# Patient Record
Sex: FEMALE | Race: OTHER | HISPANIC OR LATINO | URBAN - METROPOLITAN AREA
[De-identification: names, ages, dates, MRNs, and addresses within clinical notes are randomized per-mention and may not be internally consistent; named-entity substitution may affect disease eponyms.]

---

## 2023-07-24 ENCOUNTER — EMERGENCY (EMERGENCY)
Facility: HOSPITAL | Age: 23
LOS: 1 days | Discharge: HOME | End: 2023-07-24
Attending: STUDENT IN AN ORGANIZED HEALTH CARE EDUCATION/TRAINING PROGRAM
Payer: SELF-PAY

## 2023-07-24 VITALS
SYSTOLIC BLOOD PRESSURE: 113 MMHG | TEMPERATURE: 99 F | OXYGEN SATURATION: 99 % | DIASTOLIC BLOOD PRESSURE: 68 MMHG | WEIGHT: 190.04 LBS | HEART RATE: 94 BPM | RESPIRATION RATE: 16 BRPM

## 2023-07-24 PROCEDURE — 99284 EMERGENCY DEPT VISIT MOD MDM: CPT

## 2023-07-24 PROCEDURE — 99053 MED SERV 10PM-8AM 24 HR FAC: CPT

## 2023-07-24 RX ORDER — ACETAMINOPHEN 500 MG
650 TABLET ORAL ONCE
Refills: 0 | Status: COMPLETED | OUTPATIENT
Start: 2023-07-24 | End: 2023-07-24

## 2023-07-24 RX ORDER — SODIUM CHLORIDE 9 MG/ML
1000 INJECTION, SOLUTION INTRAVENOUS
Refills: 0 | Status: DISCONTINUED | OUTPATIENT
Start: 2023-07-24 | End: 2023-08-01

## 2023-07-24 RX ORDER — ONDANSETRON 8 MG/1
4 TABLET, FILM COATED ORAL ONCE
Refills: 0 | Status: COMPLETED | OUTPATIENT
Start: 2023-07-24 | End: 2023-07-24

## 2023-07-24 NOTE — ED ADULT TRIAGE NOTE - CHIEF COMPLAINT QUOTE
Ambulatory to ED c/o lower abdominal and back pain x multiple days. Patient states she is approx 8 weeks pregnant, 3rd pregnancy.

## 2023-07-25 VITALS
OXYGEN SATURATION: 99 % | TEMPERATURE: 98 F | DIASTOLIC BLOOD PRESSURE: 74 MMHG | RESPIRATION RATE: 18 BRPM | SYSTOLIC BLOOD PRESSURE: 110 MMHG | HEART RATE: 74 BPM

## 2023-07-25 LAB
ALBUMIN SERPL ELPH-MCNC: 4.3 G/DL — SIGNIFICANT CHANGE UP (ref 3.3–5.2)
ALP SERPL-CCNC: 59 U/L — SIGNIFICANT CHANGE UP (ref 40–120)
ALT FLD-CCNC: 13 U/L — SIGNIFICANT CHANGE UP
ANION GAP SERPL CALC-SCNC: 16 MMOL/L — SIGNIFICANT CHANGE UP (ref 5–17)
APPEARANCE UR: CLEAR — SIGNIFICANT CHANGE UP
AST SERPL-CCNC: 18 U/L — SIGNIFICANT CHANGE UP
BACTERIA # UR AUTO: ABNORMAL
BASOPHILS # BLD AUTO: 0.02 K/UL — SIGNIFICANT CHANGE UP (ref 0–0.2)
BASOPHILS NFR BLD AUTO: 0.2 % — SIGNIFICANT CHANGE UP (ref 0–2)
BILIRUB SERPL-MCNC: 0.3 MG/DL — LOW (ref 0.4–2)
BILIRUB UR-MCNC: NEGATIVE — SIGNIFICANT CHANGE UP
BUN SERPL-MCNC: 7 MG/DL — LOW (ref 8–20)
CALCIUM SERPL-MCNC: 10 MG/DL — SIGNIFICANT CHANGE UP (ref 8.4–10.5)
CHLORIDE SERPL-SCNC: 99 MMOL/L — SIGNIFICANT CHANGE UP (ref 96–108)
CO2 SERPL-SCNC: 22 MMOL/L — SIGNIFICANT CHANGE UP (ref 22–29)
COLOR SPEC: YELLOW — SIGNIFICANT CHANGE UP
CREAT SERPL-MCNC: 0.45 MG/DL — LOW (ref 0.5–1.3)
DIFF PNL FLD: NEGATIVE — SIGNIFICANT CHANGE UP
EGFR: 138 ML/MIN/1.73M2 — SIGNIFICANT CHANGE UP
EOSINOPHIL # BLD AUTO: 0.17 K/UL — SIGNIFICANT CHANGE UP (ref 0–0.5)
EOSINOPHIL NFR BLD AUTO: 2.1 % — SIGNIFICANT CHANGE UP (ref 0–6)
EPI CELLS # UR: SIGNIFICANT CHANGE UP
GLUCOSE SERPL-MCNC: 82 MG/DL — SIGNIFICANT CHANGE UP (ref 70–99)
GLUCOSE UR QL: 1000 MG/DL
HCG SERPL-ACNC: HIGH MIU/ML
HCT VFR BLD CALC: 36.8 % — SIGNIFICANT CHANGE UP (ref 34.5–45)
HGB BLD-MCNC: 12.7 G/DL — SIGNIFICANT CHANGE UP (ref 11.5–15.5)
IMM GRANULOCYTES NFR BLD AUTO: 0.2 % — SIGNIFICANT CHANGE UP (ref 0–0.9)
KETONES UR-MCNC: ABNORMAL
LEUKOCYTE ESTERASE UR-ACNC: ABNORMAL
LYMPHOCYTES # BLD AUTO: 2.73 K/UL — SIGNIFICANT CHANGE UP (ref 1–3.3)
LYMPHOCYTES # BLD AUTO: 34.1 % — SIGNIFICANT CHANGE UP (ref 13–44)
MCHC RBC-ENTMCNC: 29.2 PG — SIGNIFICANT CHANGE UP (ref 27–34)
MCHC RBC-ENTMCNC: 34.5 GM/DL — SIGNIFICANT CHANGE UP (ref 32–36)
MCV RBC AUTO: 84.6 FL — SIGNIFICANT CHANGE UP (ref 80–100)
MONOCYTES # BLD AUTO: 0.61 K/UL — SIGNIFICANT CHANGE UP (ref 0–0.9)
MONOCYTES NFR BLD AUTO: 7.6 % — SIGNIFICANT CHANGE UP (ref 2–14)
NEUTROPHILS # BLD AUTO: 4.46 K/UL — SIGNIFICANT CHANGE UP (ref 1.8–7.4)
NEUTROPHILS NFR BLD AUTO: 55.8 % — SIGNIFICANT CHANGE UP (ref 43–77)
NITRITE UR-MCNC: NEGATIVE — SIGNIFICANT CHANGE UP
PH UR: 6 — SIGNIFICANT CHANGE UP (ref 5–8)
PLATELET # BLD AUTO: 306 K/UL — SIGNIFICANT CHANGE UP (ref 150–400)
POTASSIUM SERPL-MCNC: 3.4 MMOL/L — LOW (ref 3.5–5.3)
POTASSIUM SERPL-SCNC: 3.4 MMOL/L — LOW (ref 3.5–5.3)
PROT SERPL-MCNC: 7.8 G/DL — SIGNIFICANT CHANGE UP (ref 6.6–8.7)
PROT UR-MCNC: NEGATIVE — SIGNIFICANT CHANGE UP
RBC # BLD: 4.35 M/UL — SIGNIFICANT CHANGE UP (ref 3.8–5.2)
RBC # FLD: 13.6 % — SIGNIFICANT CHANGE UP (ref 10.3–14.5)
RBC CASTS # UR COMP ASSIST: SIGNIFICANT CHANGE UP /HPF (ref 0–4)
SODIUM SERPL-SCNC: 136 MMOL/L — SIGNIFICANT CHANGE UP (ref 135–145)
SP GR SPEC: 1.01 — SIGNIFICANT CHANGE UP (ref 1.01–1.02)
UROBILINOGEN FLD QL: NEGATIVE MG/DL — SIGNIFICANT CHANGE UP
WBC # BLD: 8.01 K/UL — SIGNIFICANT CHANGE UP (ref 3.8–10.5)
WBC # FLD AUTO: 8.01 K/UL — SIGNIFICANT CHANGE UP (ref 3.8–10.5)
WBC UR QL: SIGNIFICANT CHANGE UP /HPF (ref 0–5)

## 2023-07-25 PROCEDURE — 76801 OB US < 14 WKS SINGLE FETUS: CPT

## 2023-07-25 PROCEDURE — T1013: CPT

## 2023-07-25 PROCEDURE — 76817 TRANSVAGINAL US OBSTETRIC: CPT

## 2023-07-25 PROCEDURE — 81001 URINALYSIS AUTO W/SCOPE: CPT

## 2023-07-25 PROCEDURE — 99284 EMERGENCY DEPT VISIT MOD MDM: CPT | Mod: 25

## 2023-07-25 PROCEDURE — 87186 SC STD MICRODIL/AGAR DIL: CPT

## 2023-07-25 PROCEDURE — 84702 CHORIONIC GONADOTROPIN TEST: CPT

## 2023-07-25 PROCEDURE — 76802 OB US < 14 WKS ADDL FETUS: CPT

## 2023-07-25 PROCEDURE — 87086 URINE CULTURE/COLONY COUNT: CPT

## 2023-07-25 PROCEDURE — 80053 COMPREHEN METABOLIC PANEL: CPT

## 2023-07-25 PROCEDURE — 76802 OB US < 14 WKS ADDL FETUS: CPT | Mod: 26

## 2023-07-25 PROCEDURE — 76801 OB US < 14 WKS SINGLE FETUS: CPT | Mod: 26

## 2023-07-25 PROCEDURE — 85025 COMPLETE CBC W/AUTO DIFF WBC: CPT

## 2023-07-25 PROCEDURE — 36415 COLL VENOUS BLD VENIPUNCTURE: CPT

## 2023-07-25 PROCEDURE — 96374 THER/PROPH/DIAG INJ IV PUSH: CPT

## 2023-07-25 PROCEDURE — 76817 TRANSVAGINAL US OBSTETRIC: CPT | Mod: 26

## 2023-07-25 RX ADMIN — SODIUM CHLORIDE 1000 MILLILITER(S): 9 INJECTION, SOLUTION INTRAVENOUS at 00:18

## 2023-07-25 RX ADMIN — Medication 650 MILLIGRAM(S): at 00:18

## 2023-07-25 RX ADMIN — ONDANSETRON 4 MILLIGRAM(S): 8 TABLET, FILM COATED ORAL at 00:18

## 2023-07-25 NOTE — ED PROVIDER NOTE - PATIENT PORTAL LINK FT
You can access the FollowMyHealth Patient Portal offered by [facility] by registering at the following website: [email or web address]. By joining [email or web address]’s FollowMyHealth portal, you will also be able to view your health information using other applications (apps) compatible with our system.

## 2023-07-25 NOTE — ED PROVIDER NOTE - NSFOLLOWUPINSTRUCTIONS_ED_ALL_ED_FT
Educación para el paciente: Síntomas del embarazo (Conceptos Básicos)  Redactado por los médicos y editores de [employer]  Por favor, corinna el descargo de responsabilidad al final de la página.    ¿Cuánto varían los síntomas del embarazo?  Los síntomas del embarazo son diferentes para cada persona. Además, la misma persona puede tener síntomas diferentes en cada embarazo. De todos modos, hay ciertos síntomas que son comunes en todos los embarazos.    Sidra artículo trata sobre los síntomas que pueden aparecer danielle un embarazo normal y saludable.    ¿Qué síntomas son comunes en la primera etapa del embarazo?  En la primera etapa del embarazo, a veces incluso antes de que la persona se entere de que está embarazada, algunos síntomas comunes pueden incluir:    ?Náuseas, con o sin vómitos – Se llaman “náuseas matutinas”, joshua pueden aparecer en cualquier momento del día. En la mayoría de las personas, las náuseas matutinas bledsoe solamente los primeros meses del embarazo.    ?Aumento de tamaño y dolor en los senos    ?Necesidad de orinar con más frecuencia de lo normal    ?Sensación de más cansancio de lo normal    ?Calambres leves en la parte inferior del área del estómago    ¿Qué síntomas pueden aparecer cuando el embarazo está más avanzado?  Pueden aparecer muchos síntomas diferentes cuando el embarazo está más avanzado. Pueden ser, entre otros:    ?Acidez estomacal o indigestión – [place] puede sentirse christina un ardor en el pecho o la garganta, o un dolor en el estómago o el pecho.    ?Estreñimiento, es decir, dificultad para evacuar    ?Hemorroides, que son venas inflamadas en el recto que pueden causar dolor o comezón – Podrían sangrar un poco al evacuar.    ?Congestión y sangrado nasal    ?Falta de aire – [place] puede empeorar gradualmente a medida que avanza el embarazo.    ?Dolor en la parte baja de la espalda    ?Calambres en las piernas    ?Problemas para dormir    ?Payal de nahid    ?Sangrado en las encías    ?Necesidad de orinar con más frecuencia de lo normal o danielle la noche    ?Sensación de cansancio    ?Engrosamiento del eleanor    ?Leve inflamación en los pies o los tobillos    ?Adormecimiento u hormigueo en simon mano, un pie o simon pierna    ?Venas varicosas, que son venas inflamadas y retorcidas    ?Contracciones “falsas” – Las contracciones se producen cuando el útero se contrae. Pueden causar dolor y hacer que el área del estómago se endurezca. Las contracciones “falsas”, también llamadas “contracciones de Arthur Mendez”, no indican que el trabajo de parto ha comenzado. Son diferentes de las contracciones “verdaderas”, que sí indican que el trabajo de parto está comenzando.    ?Cambios en la piel – Puede marsha muchos cambios diferentes en la piel danielle el embarazo, entre ellos:    •Cambios del color de la piel – Las tera podrían ponerse de color horace o rico. Además, la piel de ciertas partes del cuerpo se puede poner más oscura, por ejemplo, en zonas de la allen o alrededor de los pezones (imagen 1).    •Simon línea oscura en el área del estómago – La línea puede ir desde el ombligo hasta la alberto púbica.    •Estrías – Estas se amanda christina líneas londono y son más comunes en el área del estómago, los senos y los muslos.    •Arañas vasculares – Se amanda christina pequeñas telas de araña enrojecidas en la piel. Son más comunes en el dominic, la allen, la parte superior del pecho, los brazos y las marivel.    •Papilomas cutáneos – Son pequeños bultos de piel normal (imagen 2).    ¿Cuándo amanda llamar a mi médico o partera?  Debe llamar al médico o partera si:    ?Tiene sangrado de la vagina    ?Le sale líquido por la vagina    ?Siente que el bebé no se mueve christina siempre    ?Tiene un dolor en la espalda o el área del estómago que no mejora con soluciones que puede probar por bennett cuenta, christina descansar o cambiar de posición    ?Se siente mareada    ?Tiene acidez estomacal y no mejora al rupert antiácidos    ?Tiene un dolor de nahid que no mejora tras descansar simon hora en simon habitación oscura y silenciosa    ?Siente dolor al orinar o hay lalitha en la orina    ?Tiene fiebre    ?Tiene contracciones que son cada vez más lizzette y frecuentes    ?Siente dolor al respirar    ?Ve manchas oscuras o destellos de shai, o tiene la vista nublada    ?Le preocupa un cambio en bennett matilda (por ejemplo, la aparición de vómitos o un sarpullido)

## 2023-07-25 NOTE — ED PROVIDER NOTE - NS ED ATTENDING STATEMENT MOD
This was a shared visit with the NIDIA. I reviewed and verified the documentation and independently performed the documented:

## 2023-07-25 NOTE — ED ADULT NURSE NOTE - NSFALLUNIVINTERV_ED_ALL_ED
Bed/Stretcher in lowest position, wheels locked, appropriate side rails in place/Call bell, personal items and telephone in reach/Instruct patient to call for assistance before getting out of bed/chair/stretcher/Non-slip footwear applied when patient is off stretcher/[place] to call system/Physically safe environment - no spills, clutter or unnecessary equipment/Purposeful proactive rounding/Room/bathroom lighting operational, light cord in reach

## 2023-07-25 NOTE — ED PROVIDER NOTE - CPE EDP SKIN NORM
Spine appears normal with mild kyphosis. no0 midline or pt vertebral or step offs + left lower paraspinal tneerness to lower lumbar and sacral region. + TTP over the left gluteus. + straight leg bilateral. no calf tenderness bilaterally. 2+ dp pulses.
normal...

## 2023-07-25 NOTE — ED PROVIDER NOTE - NS ED ROS FT
ROS: CONTUSIONAL: Denies fever, chills, fatigue, wt loss. HEAD: Denies trauma, HA, Dizziness. EYE: Denies Acute visual changes, diplopia. ENMT: Denies change in hearing, tinnitus, epistaxis, difficulty swallowing, sore throat. CARDIO: Denies CP, palpitations, edema. RESP: Denies Cough, SOB , Diff breathing, hemoptysis. GI: ABD pain, Denies N/V,  change in bowel movement. URINARY: Denies difficulty urinating, pelvic pain. MS:  back pain Denies joint pain,, weakness, decreased ROM, swelling. NEURO: Denies change in gait, seizures, loss of sensation, dizziness, confusion LOC.  PSY: NO SI/HI. SKIN: Denies Rash, bruising.

## 2023-07-25 NOTE — ED PROVIDER NOTE - ADDITIONAL NOTES AND INSTRUCTIONS:
PT was evaluated At [facility] ED and was found to have a condition that warranted time of to rest and heal from WORK/SCHOOL.   Mateo Jaramillo PA-C

## 2023-07-25 NOTE — ED PROVIDER NOTE - OBJECTIVE STATEMENT
PT with SPMHX of being  approx 8wk preg   presents to the ED with complaint of lower abd pain and back pain that has been interment over the last 2 wk. Pt states that she has dull pain, non radiating,  that is not made better or worse by anything. Pt states that she has not been seen by GYn for this pregnancy. Pt admits to intermit N/V, Pt dines N/V, vag bleeding, vag discharge, urinary symptoms, abd pain, abd cramping,

## 2023-07-25 NOTE — ED PROVIDER NOTE - ATTENDING APP SHARED VISIT CONTRIBUTION OF CARE
22y F, , ~8 weeks pregnant, presents for lower abdominal pain. No vaginal bleeding. Has not yet seen OBGYN during this pregnancy. US showing twin live IUP. No acute findings on labs/urine. Medically stable for discharge with outpatient f/u.

## 2023-07-28 RX ORDER — CEPHALEXIN 500 MG
1 CAPSULE ORAL
Qty: 21 | Refills: 0
Start: 2023-07-28 | End: 2023-08-03

## 2023-08-21 ENCOUNTER — APPOINTMENT (OUTPATIENT)
Dept: ANTEPARTUM | Facility: CLINIC | Age: 23
End: 2023-08-21

## 2023-08-22 ENCOUNTER — APPOINTMENT (OUTPATIENT)
Dept: ANTEPARTUM | Facility: CLINIC | Age: 23
End: 2023-08-22

## 2023-09-11 PROBLEM — Z00.00 ENCOUNTER FOR PREVENTIVE HEALTH EXAMINATION: Status: ACTIVE | Noted: 2023-09-11

## 2023-09-20 ENCOUNTER — ASOB RESULT (OUTPATIENT)
Age: 23
End: 2023-09-20

## 2023-09-20 ENCOUNTER — APPOINTMENT (OUTPATIENT)
Dept: ANTEPARTUM | Facility: CLINIC | Age: 23
End: 2023-09-20
Payer: MEDICAID

## 2023-09-20 PROCEDURE — 76815 OB US LIMITED FETUS(S): CPT

## 2023-10-02 ENCOUNTER — INPATIENT (INPATIENT)
Facility: HOSPITAL | Age: 23
LOS: 7 days | Discharge: HOME | DRG: 831 | End: 2023-10-10
Attending: OBSTETRICS & GYNECOLOGY | Admitting: OBSTETRICS & GYNECOLOGY
Payer: COMMERCIAL

## 2023-10-02 VITALS
SYSTOLIC BLOOD PRESSURE: 95 MMHG | WEIGHT: 162.04 LBS | RESPIRATION RATE: 18 BRPM | HEIGHT: 67 IN | OXYGEN SATURATION: 100 % | HEART RATE: 126 BPM | TEMPERATURE: 102 F | DIASTOLIC BLOOD PRESSURE: 63 MMHG

## 2023-10-02 LAB
ALBUMIN SERPL ELPH-MCNC: 3.7 G/DL — SIGNIFICANT CHANGE UP (ref 3.3–5.2)
ALP SERPL-CCNC: 56 U/L — SIGNIFICANT CHANGE UP (ref 40–120)
ALT FLD-CCNC: 9 U/L — SIGNIFICANT CHANGE UP
ANION GAP SERPL CALC-SCNC: 18 MMOL/L — HIGH (ref 5–17)
APPEARANCE UR: CLEAR — SIGNIFICANT CHANGE UP
APTT BLD: 32.4 SEC — SIGNIFICANT CHANGE UP (ref 24.5–35.6)
AST SERPL-CCNC: 15 U/L — SIGNIFICANT CHANGE UP
BACTERIA # UR AUTO: ABNORMAL
BASE EXCESS BLDV CALC-SCNC: -5 MMOL/L — LOW (ref -2–3)
BASOPHILS # BLD AUTO: 0 K/UL — SIGNIFICANT CHANGE UP (ref 0–0.2)
BASOPHILS NFR BLD AUTO: 0 % — SIGNIFICANT CHANGE UP (ref 0–2)
BILIRUB SERPL-MCNC: 0.6 MG/DL — SIGNIFICANT CHANGE UP (ref 0.4–2)
BILIRUB UR-MCNC: NEGATIVE — SIGNIFICANT CHANGE UP
BUN SERPL-MCNC: 4.2 MG/DL — LOW (ref 8–20)
CA-I SERPL-SCNC: 1.19 MMOL/L — SIGNIFICANT CHANGE UP (ref 1.15–1.33)
CALCIUM SERPL-MCNC: 9.2 MG/DL — SIGNIFICANT CHANGE UP (ref 8.4–10.5)
CHLORIDE BLDV-SCNC: 102 MMOL/L — SIGNIFICANT CHANGE UP (ref 96–108)
CHLORIDE SERPL-SCNC: 96 MMOL/L — SIGNIFICANT CHANGE UP (ref 96–108)
CO2 SERPL-SCNC: 18 MMOL/L — LOW (ref 22–29)
COLOR SPEC: YELLOW — SIGNIFICANT CHANGE UP
CREAT SERPL-MCNC: 0.38 MG/DL — LOW (ref 0.5–1.3)
DIFF PNL FLD: ABNORMAL
EGFR: 143 ML/MIN/1.73M2 — SIGNIFICANT CHANGE UP
EOSINOPHIL # BLD AUTO: 0.01 K/UL — SIGNIFICANT CHANGE UP (ref 0–0.5)
EOSINOPHIL NFR BLD AUTO: 0.1 % — SIGNIFICANT CHANGE UP (ref 0–6)
EPI CELLS # UR: SIGNIFICANT CHANGE UP
GAS PNL BLDV: 131 MMOL/L — LOW (ref 136–145)
GAS PNL BLDV: SIGNIFICANT CHANGE UP
GLUCOSE BLDV-MCNC: 93 MG/DL — SIGNIFICANT CHANGE UP (ref 70–99)
GLUCOSE SERPL-MCNC: 88 MG/DL — SIGNIFICANT CHANGE UP (ref 70–99)
GLUCOSE UR QL: NEGATIVE — SIGNIFICANT CHANGE UP
HCO3 BLDV-SCNC: 20 MMOL/L — LOW (ref 22–29)
HCT VFR BLD CALC: 30.3 % — LOW (ref 34.5–45)
HCT VFR BLDA CALC: 31 % — SIGNIFICANT CHANGE UP
HGB BLD CALC-MCNC: 10.2 G/DL — LOW (ref 11.7–16.1)
HGB BLD-MCNC: 10.2 G/DL — LOW (ref 11.5–15.5)
IMM GRANULOCYTES NFR BLD AUTO: 0.6 % — SIGNIFICANT CHANGE UP (ref 0–0.9)
INR BLD: 1.11 RATIO — SIGNIFICANT CHANGE UP (ref 0.85–1.18)
KETONES UR-MCNC: ABNORMAL
LACTATE BLDV-MCNC: 1.3 MMOL/L — SIGNIFICANT CHANGE UP (ref 0.5–2)
LEUKOCYTE ESTERASE UR-ACNC: ABNORMAL
LIDOCAIN IGE QN: 12 U/L — LOW (ref 22–51)
LYMPHOCYTES # BLD AUTO: 0.85 K/UL — LOW (ref 1–3.3)
LYMPHOCYTES # BLD AUTO: 9.8 % — LOW (ref 13–44)
MCHC RBC-ENTMCNC: 29.7 PG — SIGNIFICANT CHANGE UP (ref 27–34)
MCHC RBC-ENTMCNC: 33.7 GM/DL — SIGNIFICANT CHANGE UP (ref 32–36)
MCV RBC AUTO: 88.1 FL — SIGNIFICANT CHANGE UP (ref 80–100)
MONOCYTES # BLD AUTO: 0.45 K/UL — SIGNIFICANT CHANGE UP (ref 0–0.9)
MONOCYTES NFR BLD AUTO: 5.2 % — SIGNIFICANT CHANGE UP (ref 2–14)
NEUTROPHILS # BLD AUTO: 7.34 K/UL — SIGNIFICANT CHANGE UP (ref 1.8–7.4)
NEUTROPHILS NFR BLD AUTO: 84.3 % — HIGH (ref 43–77)
NITRITE UR-MCNC: POSITIVE
PCO2 BLDV: 30 MMHG — LOW (ref 39–42)
PH BLDV: 7.42 — SIGNIFICANT CHANGE UP (ref 7.32–7.43)
PH UR: 7 — SIGNIFICANT CHANGE UP (ref 5–8)
PLATELET # BLD AUTO: 248 K/UL — SIGNIFICANT CHANGE UP (ref 150–400)
PO2 BLDV: 51 MMHG — HIGH (ref 25–45)
POTASSIUM BLDV-SCNC: 3.3 MMOL/L — LOW (ref 3.5–5.1)
POTASSIUM SERPL-MCNC: 3.2 MMOL/L — LOW (ref 3.5–5.3)
POTASSIUM SERPL-SCNC: 3.2 MMOL/L — LOW (ref 3.5–5.3)
PROT SERPL-MCNC: 6.8 G/DL — SIGNIFICANT CHANGE UP (ref 6.6–8.7)
PROT UR-MCNC: NEGATIVE — SIGNIFICANT CHANGE UP
PROTHROM AB SERPL-ACNC: 12.3 SEC — SIGNIFICANT CHANGE UP (ref 9.5–13)
RAPID RVP RESULT: SIGNIFICANT CHANGE UP
RBC # BLD: 3.44 M/UL — LOW (ref 3.8–5.2)
RBC # FLD: 13.2 % — SIGNIFICANT CHANGE UP (ref 10.3–14.5)
RBC CASTS # UR COMP ASSIST: SIGNIFICANT CHANGE UP /HPF (ref 0–4)
SAO2 % BLDV: 83.8 % — SIGNIFICANT CHANGE UP
SARS-COV-2 RNA SPEC QL NAA+PROBE: SIGNIFICANT CHANGE UP
SODIUM SERPL-SCNC: 131 MMOL/L — LOW (ref 135–145)
SP GR SPEC: 1 — LOW (ref 1.01–1.02)
UROBILINOGEN FLD QL: 1
WBC # BLD: 8.7 K/UL — SIGNIFICANT CHANGE UP (ref 3.8–10.5)
WBC # FLD AUTO: 8.7 K/UL — SIGNIFICANT CHANGE UP (ref 3.8–10.5)
WBC UR QL: ABNORMAL /HPF (ref 0–5)

## 2023-10-02 PROCEDURE — 99285 EMERGENCY DEPT VISIT HI MDM: CPT

## 2023-10-02 RX ORDER — CEFTRIAXONE 500 MG/1
1000 INJECTION, POWDER, FOR SOLUTION INTRAMUSCULAR; INTRAVENOUS ONCE
Refills: 0 | Status: DISCONTINUED | OUTPATIENT
Start: 2023-10-02 | End: 2023-10-02

## 2023-10-02 RX ORDER — SODIUM CHLORIDE 9 MG/ML
1000 INJECTION, SOLUTION INTRAVENOUS ONCE
Refills: 0 | Status: COMPLETED | OUTPATIENT
Start: 2023-10-02 | End: 2023-10-02

## 2023-10-02 RX ORDER — SODIUM CHLORIDE 9 MG/ML
2300 INJECTION INTRAMUSCULAR; INTRAVENOUS; SUBCUTANEOUS ONCE
Refills: 0 | Status: COMPLETED | OUTPATIENT
Start: 2023-10-02 | End: 2023-10-02

## 2023-10-02 RX ORDER — ACETAMINOPHEN 500 MG
1000 TABLET ORAL ONCE
Refills: 0 | Status: COMPLETED | OUTPATIENT
Start: 2023-10-02 | End: 2023-10-02

## 2023-10-02 RX ORDER — CEFTRIAXONE 500 MG/1
1000 INJECTION, POWDER, FOR SOLUTION INTRAMUSCULAR; INTRAVENOUS ONCE
Refills: 0 | Status: COMPLETED | OUTPATIENT
Start: 2023-10-02 | End: 2023-10-02

## 2023-10-02 RX ADMIN — SODIUM CHLORIDE 1000 MILLILITER(S): 9 INJECTION, SOLUTION INTRAVENOUS at 23:05

## 2023-10-02 RX ADMIN — Medication 400 MILLIGRAM(S): at 20:15

## 2023-10-02 RX ADMIN — Medication 1000 MILLIGRAM(S): at 20:30

## 2023-10-02 RX ADMIN — SODIUM CHLORIDE 2300 MILLILITER(S): 9 INJECTION INTRAMUSCULAR; INTRAVENOUS; SUBCUTANEOUS at 20:19

## 2023-10-02 RX ADMIN — Medication 1000 MILLIGRAM(S): at 20:51

## 2023-10-02 RX ADMIN — CEFTRIAXONE 1000 MILLIGRAM(S): 500 INJECTION, POWDER, FOR SOLUTION INTRAMUSCULAR; INTRAVENOUS at 20:16

## 2023-10-02 NOTE — ED ADULT NURSE NOTE - CHIEF COMPLAINT QUOTE
pt c/o low abd pain c/o chills headache, 4 months pregnant- denies vag bleeding  A&Ox3, resp wnl, denies pain on urination

## 2023-10-02 NOTE — ED PROVIDER NOTE - CLINICAL SUMMARY MEDICAL DECISION MAKING FREE TEXT BOX
23y female A0 about 20 weeks pregnant w/ no significant pmh presenting with fever, lower abdominal pain, and vomiting that began this morning. Patient tachycardic, febrile, and hypotensive meeting sepsis criteria. Sepsis workup initiated, TVUS ordered 23y female A0 about 20 weeks pregnant w/ no significant pmh presenting with fever, lower abdominal pain, and vomiting that began this morning. Patient tachycardic, febrile, and hypotensive meeting sepsis criteria. Sepsis workup initiated, TVUS ordered. Workup shows leukocytosis, UA consistent with infection. Blood pressure improved with 3L IV fluids. OB consulted, US showed two IUPs. Patient to be admitted to antepartum.

## 2023-10-02 NOTE — ED ADULT TRIAGE NOTE - CHIEF COMPLAINT QUOTE
pt c/o low abd pain c/o chills headache pt c/o low abd pain c/o chills headache, 4 months pregnant- denies vag bleeding  A&Ox3, resp wnl, denies pain on urination

## 2023-10-02 NOTE — ED ADULT NURSE NOTE - NS ED NURSE REPORT GIVEN DT
Mom called and stated that the patient is still having throat pain and she was told that a script for an antibiotic could be called in for the patient  Please give mom a call and let her know if this still can be done or when complete 03-Oct-2023 05:11

## 2023-10-02 NOTE — ED ADULT NURSE NOTE - OBJECTIVE STATEMENT
23y female A0 about 20 weeks pregnant w/ no significant PMH presenting with lower abdominal pain that began this morning. She has felt feverish at home, has a headache, and has vomited three times. States she is urinating more frequently but denies dysuria or hematuria. Denies vaginal bleeding.  Patient was transferred to critical care due to decreased BP and increased HR.  Seen and evaluated by provider, orders obtained and noted.  IV placed, blood specimens and nasal swab obtained and sent to lab.  Medicated as ordered.   at bedside.  Offers no further complaints at this time.

## 2023-10-02 NOTE — ED PROVIDER NOTE - ATTENDING CONTRIBUTION TO CARE
[place]: I performed a face to face evaluation of this patient and performed a full history and physical examination on the patient.  I agree with the resident's history, physical examination, and plan of the patient unless otherwise noted. My brief assessment is as follows:  ollie winter at ~20 weeks per pt, p/w 1 day fever, suprapubic abd discomfort with urinary frequency, vomiting, headache, and 2weakness. initial bp ~95, repeated ~110. febrile, tachycardic, +rhinorrhea, no cough/sore throat, no cp/sob, no neck pain/stiffness. no flank pain. no other complaints. non toxic, minimally uncomfortable ctab, tachycardic, abd soft with suprapubic ttp, neuro intact. labs, empiric abx, fluids, urine, rvp. ob sono. reassess

## 2023-10-02 NOTE — ED ADULT NURSE NOTE - NSFALLUNIVINTERV_ED_ALL_ED
Bed/Stretcher in lowest position, wheels locked, appropriate side rails in place/Call bell, personal items and telephone in reach/Instruct patient to call for assistance before getting out of bed/chair/stretcher/Non-slip footwear applied when patient is off stretcher/[place] to call system/Physically safe environment - no spills, clutter or unnecessary equipment/Purposeful proactive rounding/Room/bathroom lighting operational, light cord in reach

## 2023-10-02 NOTE — ED PROVIDER NOTE - NS_EDPROVIDERDISPOUSERTYPE_ED_A_ED
Pharmacy calling  for prior authorization on:    Medication: Trulicity 0.75mg/ 0.5mL pen injector  Dosage: 0.75mg into the skin every 7 days  Quantity requested:  6 ml 3 RF  Pharmacy for prescription has been selected.    Prior authorization request has been initiated and sent for completion.     Attending Attestation (For Attendings USE Only)...

## 2023-10-02 NOTE — ED PROVIDER NOTE - NS ED ROS FT
General: fever  HEENT: Denies sore throat  Neck: Denies neck pain  Resp: Denies coughing, SOB  Cardiovascular: Denies CP, palpitations, LE edema  GI: nausea, vomiting, abdominal pain. Denies diarrhea, constipation, blood in stool  : Denies dysuria, hematuria  MSK: Denies back pain  Neuro: Denies dizziness, numbness, weakness  Skin: Denies rashes.

## 2023-10-02 NOTE — ED PROVIDER NOTE - PHYSICAL EXAMINATION
General: Awake, alert, lying in bed, appears uncomfortable   HEENT: Normocephalic, atraumatic. No scleral icterus or conjunctival injection. EOMI. Moist mucous membranes. Oropharynx clear.   Neck:. Soft and supple.  Cardiac: tachycardic, Peripheral pulses 2+ and symmetric. No LE edema.  Resp: Lungs CTAB. No accessory muscle use  Abd: Soft, RLQ and middle lower abdominal pain, non-distended. No guarding, rebound, or rigidity.  Back: Spine midline and non-tender.   Skin: No rashes, abrasions, or lacerations.  Neuro: AO x 4. Moves all extremities symmetrically. Motor strength and sensation grossly intact.  Psych: Appropriate mood and affect

## 2023-10-02 NOTE — ED PROVIDER NOTE - OBJECTIVE STATEMENT
23y female A0 about 20 weeks pregnant w/ no significant pmh presenting with lower abdominal pain that began this morning. She has felt feverish at home, has a headache, and has vomited three times. States she is urinating more frequently but denies dysuria or hematuria. Denies vaginal bleeding. Denies cough, congestion, CP, SOB, diarrhea, or blood in her stool. No history of surgery to the abdomen. Last ultrasound was a month ago, everything with the pregnancy has been progressing normally.

## 2023-10-03 DIAGNOSIS — E87.6 HYPOKALEMIA: ICD-10-CM

## 2023-10-03 DIAGNOSIS — Z3A.16 16 WEEKS GESTATION OF PREGNANCY: ICD-10-CM

## 2023-10-03 DIAGNOSIS — Z00.00 ENCOUNTER FOR GENERAL ADULT MEDICAL EXAMINATION WITHOUT ABNORMAL FINDINGS: ICD-10-CM

## 2023-10-03 DIAGNOSIS — N12 TUBULO-INTERSTITIAL NEPHRITIS, NOT SPECIFIED AS ACUTE OR CHRONIC: ICD-10-CM

## 2023-10-03 DIAGNOSIS — A41.9 SEPSIS, UNSPECIFIED ORGANISM: ICD-10-CM

## 2023-10-03 DIAGNOSIS — O30.049 TWIN PREGNANCY, DICHORIONIC/DIAMNIOTIC, UNSPECIFIED TRIMESTER: ICD-10-CM

## 2023-10-03 DIAGNOSIS — O99.019 ANEMIA COMPLICATING PREGNANCY, UNSPECIFIED TRIMESTER: ICD-10-CM

## 2023-10-03 LAB
ANION GAP SERPL CALC-SCNC: 11 MMOL/L — SIGNIFICANT CHANGE UP (ref 5–17)
ANION GAP SERPL CALC-SCNC: 11 MMOL/L — SIGNIFICANT CHANGE UP (ref 5–17)
ANION GAP SERPL CALC-SCNC: 16 MMOL/L — SIGNIFICANT CHANGE UP (ref 5–17)
APTT BLD: 32.2 SEC — SIGNIFICANT CHANGE UP (ref 24.5–35.6)
BASOPHILS # BLD AUTO: 0.01 K/UL — SIGNIFICANT CHANGE UP (ref 0–0.2)
BASOPHILS NFR BLD AUTO: 0.1 % — SIGNIFICANT CHANGE UP (ref 0–2)
BLD GP AB SCN SERPL QL: SIGNIFICANT CHANGE UP
BUN SERPL-MCNC: <3 MG/DL — LOW (ref 8–20)
CALCIUM SERPL-MCNC: 8.3 MG/DL — LOW (ref 8.4–10.5)
CALCIUM SERPL-MCNC: 8.3 MG/DL — LOW (ref 8.4–10.5)
CALCIUM SERPL-MCNC: 8.6 MG/DL — SIGNIFICANT CHANGE UP (ref 8.4–10.5)
CHLORIDE SERPL-SCNC: 100 MMOL/L — SIGNIFICANT CHANGE UP (ref 96–108)
CHLORIDE SERPL-SCNC: 102 MMOL/L — SIGNIFICANT CHANGE UP (ref 96–108)
CHLORIDE SERPL-SCNC: 104 MMOL/L — SIGNIFICANT CHANGE UP (ref 96–108)
CO2 SERPL-SCNC: 17 MMOL/L — LOW (ref 22–29)
CO2 SERPL-SCNC: 18 MMOL/L — LOW (ref 22–29)
CO2 SERPL-SCNC: 18 MMOL/L — LOW (ref 22–29)
CREAT SERPL-MCNC: 0.37 MG/DL — LOW (ref 0.5–1.3)
CREAT SERPL-MCNC: 0.39 MG/DL — LOW (ref 0.5–1.3)
CREAT SERPL-MCNC: 0.41 MG/DL — LOW (ref 0.5–1.3)
E COLI DNA BLD POS QL NAA+NON-PROBE: SIGNIFICANT CHANGE UP
EGFR: 140 ML/MIN/1.73M2 — SIGNIFICANT CHANGE UP
EGFR: 142 ML/MIN/1.73M2 — SIGNIFICANT CHANGE UP
EGFR: 143 ML/MIN/1.73M2 — SIGNIFICANT CHANGE UP
EOSINOPHIL # BLD AUTO: 0 K/UL — SIGNIFICANT CHANGE UP (ref 0–0.5)
EOSINOPHIL NFR BLD AUTO: 0 % — SIGNIFICANT CHANGE UP (ref 0–6)
FERRITIN SERPL-MCNC: 57 NG/ML — SIGNIFICANT CHANGE UP (ref 15–150)
GLUCOSE SERPL-MCNC: 81 MG/DL — SIGNIFICANT CHANGE UP (ref 70–99)
GLUCOSE SERPL-MCNC: 91 MG/DL — SIGNIFICANT CHANGE UP (ref 70–99)
GLUCOSE SERPL-MCNC: 97 MG/DL — SIGNIFICANT CHANGE UP (ref 70–99)
GRAM STN FLD: SIGNIFICANT CHANGE UP
HAPTOGLOB SERPL-MCNC: 121 MG/DL — SIGNIFICANT CHANGE UP (ref 34–200)
HCT VFR BLD CALC: 25.9 % — LOW (ref 34.5–45)
HCT VFR BLD CALC: 26 % — LOW (ref 34.5–45)
HGB BLD-MCNC: 8.5 G/DL — LOW (ref 11.5–15.5)
HGB BLD-MCNC: 8.7 G/DL — LOW (ref 11.5–15.5)
IMM GRANULOCYTES NFR BLD AUTO: 0.4 % — SIGNIFICANT CHANGE UP (ref 0–0.9)
IRON SATN MFR SERPL: 39 UG/DL — SIGNIFICANT CHANGE UP (ref 37–145)
IRON SATN MFR SERPL: 9 % — LOW (ref 14–50)
LACTATE BLDV-MCNC: 1.6 MMOL/L — SIGNIFICANT CHANGE UP (ref 0.5–2)
LACTATE SERPL-SCNC: 0.9 MMOL/L — SIGNIFICANT CHANGE UP (ref 0.5–2)
LYMPHOCYTES # BLD AUTO: 1.02 K/UL — SIGNIFICANT CHANGE UP (ref 1–3.3)
LYMPHOCYTES # BLD AUTO: 12.8 % — LOW (ref 13–44)
MAGNESIUM SERPL-MCNC: 1.4 MG/DL — LOW (ref 1.6–2.6)
MCHC RBC-ENTMCNC: 29.9 PG — SIGNIFICANT CHANGE UP (ref 27–34)
MCHC RBC-ENTMCNC: 30.7 PG — SIGNIFICANT CHANGE UP (ref 27–34)
MCHC RBC-ENTMCNC: 32.7 GM/DL — SIGNIFICANT CHANGE UP (ref 32–36)
MCHC RBC-ENTMCNC: 33.6 GM/DL — SIGNIFICANT CHANGE UP (ref 32–36)
MCV RBC AUTO: 91.5 FL — SIGNIFICANT CHANGE UP (ref 80–100)
MCV RBC AUTO: 91.5 FL — SIGNIFICANT CHANGE UP (ref 80–100)
METHOD TYPE: SIGNIFICANT CHANGE UP
MONOCYTES # BLD AUTO: 0.4 K/UL — SIGNIFICANT CHANGE UP (ref 0–0.9)
MONOCYTES NFR BLD AUTO: 5 % — SIGNIFICANT CHANGE UP (ref 2–14)
NEUTROPHILS # BLD AUTO: 6.51 K/UL — SIGNIFICANT CHANGE UP (ref 1.8–7.4)
NEUTROPHILS NFR BLD AUTO: 81.7 % — HIGH (ref 43–77)
PHOSPHATE SERPL-MCNC: 3.7 MG/DL — SIGNIFICANT CHANGE UP (ref 2.4–4.7)
PLATELET # BLD AUTO: 176 K/UL — SIGNIFICANT CHANGE UP (ref 150–400)
PLATELET # BLD AUTO: 205 K/UL — SIGNIFICANT CHANGE UP (ref 150–400)
POTASSIUM SERPL-MCNC: 2.7 MMOL/L — CRITICAL LOW (ref 3.5–5.3)
POTASSIUM SERPL-MCNC: 3.4 MMOL/L — LOW (ref 3.5–5.3)
POTASSIUM SERPL-MCNC: 3.8 MMOL/L — SIGNIFICANT CHANGE UP (ref 3.5–5.3)
POTASSIUM SERPL-SCNC: 2.7 MMOL/L — CRITICAL LOW (ref 3.5–5.3)
POTASSIUM SERPL-SCNC: 3.4 MMOL/L — LOW (ref 3.5–5.3)
POTASSIUM SERPL-SCNC: 3.8 MMOL/L — SIGNIFICANT CHANGE UP (ref 3.5–5.3)
RBC # BLD: 2.83 M/UL — LOW (ref 3.8–5.2)
RBC # BLD: 2.84 M/UL — LOW (ref 3.8–5.2)
RBC # FLD: 13.4 % — SIGNIFICANT CHANGE UP (ref 10.3–14.5)
RBC # FLD: 13.6 % — SIGNIFICANT CHANGE UP (ref 10.3–14.5)
SODIUM SERPL-SCNC: 129 MMOL/L — LOW (ref 135–145)
SODIUM SERPL-SCNC: 131 MMOL/L — LOW (ref 135–145)
SODIUM SERPL-SCNC: 136 MMOL/L — SIGNIFICANT CHANGE UP (ref 135–145)
SPECIMEN SOURCE: SIGNIFICANT CHANGE UP
TIBC SERPL-MCNC: 423 UG/DL — SIGNIFICANT CHANGE UP (ref 220–430)
TRANSFERRIN SERPL-MCNC: 296 MG/DL — SIGNIFICANT CHANGE UP (ref 192–382)
WBC # BLD: 7.38 K/UL — SIGNIFICANT CHANGE UP (ref 3.8–10.5)
WBC # BLD: 7.97 K/UL — SIGNIFICANT CHANGE UP (ref 3.8–10.5)
WBC # FLD AUTO: 7.38 K/UL — SIGNIFICANT CHANGE UP (ref 3.8–10.5)
WBC # FLD AUTO: 7.97 K/UL — SIGNIFICANT CHANGE UP (ref 3.8–10.5)

## 2023-10-03 PROCEDURE — 76775 US EXAM ABDO BACK WALL LIM: CPT | Mod: 26

## 2023-10-03 PROCEDURE — 93010 ELECTROCARDIOGRAM REPORT: CPT

## 2023-10-03 PROCEDURE — 99291 CRITICAL CARE FIRST HOUR: CPT | Mod: GC

## 2023-10-03 PROCEDURE — 99233 SBSQ HOSP IP/OBS HIGH 50: CPT | Mod: GC

## 2023-10-03 PROCEDURE — 99223 1ST HOSP IP/OBS HIGH 75: CPT

## 2023-10-03 PROCEDURE — 93306 TTE W/DOPPLER COMPLETE: CPT | Mod: 26

## 2023-10-03 RX ORDER — SODIUM CHLORIDE 9 MG/ML
1000 INJECTION, SOLUTION INTRAVENOUS
Refills: 0 | Status: DISCONTINUED | OUTPATIENT
Start: 2023-10-03 | End: 2023-10-03

## 2023-10-03 RX ORDER — PIPERACILLIN AND TAZOBACTAM 4; .5 G/20ML; G/20ML
3.38 INJECTION, POWDER, LYOPHILIZED, FOR SOLUTION INTRAVENOUS EVERY 8 HOURS
Refills: 0 | Status: DISCONTINUED | OUTPATIENT
Start: 2023-10-03 | End: 2023-10-06

## 2023-10-03 RX ORDER — SODIUM CHLORIDE 9 MG/ML
1000 INJECTION, SOLUTION INTRAVENOUS
Refills: 0 | Status: DISCONTINUED | OUTPATIENT
Start: 2023-10-03 | End: 2023-10-04

## 2023-10-03 RX ORDER — CEFTRIAXONE 500 MG/1
1000 INJECTION, POWDER, FOR SOLUTION INTRAMUSCULAR; INTRAVENOUS EVERY 24 HOURS
Refills: 0 | Status: DISCONTINUED | OUTPATIENT
Start: 2023-10-03 | End: 2023-10-03

## 2023-10-03 RX ORDER — PIPERACILLIN AND TAZOBACTAM 4; .5 G/20ML; G/20ML
3.38 INJECTION, POWDER, LYOPHILIZED, FOR SOLUTION INTRAVENOUS ONCE
Refills: 0 | Status: COMPLETED | OUTPATIENT
Start: 2023-10-03 | End: 2023-10-03

## 2023-10-03 RX ORDER — NOREPINEPHRINE BITARTRATE/D5W 8 MG/250ML
0.05 PLASTIC BAG, INJECTION (ML) INTRAVENOUS
Qty: 8 | Refills: 0 | Status: DISCONTINUED | OUTPATIENT
Start: 2023-10-03 | End: 2023-10-04

## 2023-10-03 RX ORDER — ONDANSETRON 8 MG/1
4 TABLET, FILM COATED ORAL ONCE
Refills: 0 | Status: COMPLETED | OUTPATIENT
Start: 2023-10-03 | End: 2023-10-03

## 2023-10-03 RX ORDER — SODIUM CHLORIDE 9 MG/ML
500 INJECTION, SOLUTION INTRAVENOUS
Refills: 0 | Status: DISCONTINUED | OUTPATIENT
Start: 2023-10-03 | End: 2023-10-03

## 2023-10-03 RX ORDER — CHLORHEXIDINE GLUCONATE 213 G/1000ML
1 SOLUTION TOPICAL
Refills: 0 | Status: DISCONTINUED | OUTPATIENT
Start: 2023-10-03 | End: 2023-10-06

## 2023-10-03 RX ORDER — SODIUM CHLORIDE 9 MG/ML
1000 INJECTION, SOLUTION INTRAVENOUS ONCE
Refills: 0 | Status: COMPLETED | OUTPATIENT
Start: 2023-10-03 | End: 2023-10-03

## 2023-10-03 RX ORDER — FERROUS SULFATE 325(65) MG
325 TABLET ORAL DAILY
Refills: 0 | Status: ACTIVE | OUTPATIENT
Start: 2023-10-03 | End: 2024-08-31

## 2023-10-03 RX ORDER — INFLUENZA VIRUS VACCINE 15; 15; 15; 15 UG/.5ML; UG/.5ML; UG/.5ML; UG/.5ML
0.5 SUSPENSION INTRAMUSCULAR ONCE
Refills: 0 | Status: ACTIVE | OUTPATIENT
Start: 2023-10-03 | End: 2024-08-31

## 2023-10-03 RX ORDER — POTASSIUM CHLORIDE 20 MEQ
40 PACKET (EA) ORAL ONCE
Refills: 0 | Status: COMPLETED | OUTPATIENT
Start: 2023-10-03 | End: 2023-10-03

## 2023-10-03 RX ORDER — SODIUM CHLORIDE 9 MG/ML
1500 INJECTION, SOLUTION INTRAVENOUS ONCE
Refills: 0 | Status: COMPLETED | OUTPATIENT
Start: 2023-10-03 | End: 2023-10-03

## 2023-10-03 RX ORDER — ACETAMINOPHEN 500 MG
975 TABLET ORAL EVERY 6 HOURS
Refills: 0 | Status: ACTIVE | OUTPATIENT
Start: 2023-10-03 | End: 2024-08-31

## 2023-10-03 RX ORDER — SODIUM CHLORIDE 9 MG/ML
500 INJECTION, SOLUTION INTRAVENOUS ONCE
Refills: 0 | Status: COMPLETED | OUTPATIENT
Start: 2023-10-03 | End: 2023-10-03

## 2023-10-03 RX ORDER — POTASSIUM CHLORIDE 20 MEQ
20 PACKET (EA) ORAL
Refills: 0 | Status: COMPLETED | OUTPATIENT
Start: 2023-10-03 | End: 2023-10-03

## 2023-10-03 RX ADMIN — PIPERACILLIN AND TAZOBACTAM 25 GRAM(S): 4; .5 INJECTION, POWDER, LYOPHILIZED, FOR SOLUTION INTRAVENOUS at 10:42

## 2023-10-03 RX ADMIN — Medication 975 MILLIGRAM(S): at 03:15

## 2023-10-03 RX ADMIN — ONDANSETRON 4 MILLIGRAM(S): 8 TABLET, FILM COATED ORAL at 08:11

## 2023-10-03 RX ADMIN — Medication 20 MILLIEQUIVALENT(S): at 08:11

## 2023-10-03 RX ADMIN — SODIUM CHLORIDE 1500 MILLILITER(S): 9 INJECTION, SOLUTION INTRAVENOUS at 20:45

## 2023-10-03 RX ADMIN — SODIUM CHLORIDE 125 MILLILITER(S): 9 INJECTION, SOLUTION INTRAVENOUS at 11:45

## 2023-10-03 RX ADMIN — Medication 975 MILLIGRAM(S): at 03:45

## 2023-10-03 RX ADMIN — SODIUM CHLORIDE 1000 MILLILITER(S): 9 INJECTION, SOLUTION INTRAVENOUS at 16:35

## 2023-10-03 RX ADMIN — Medication 975 MILLIGRAM(S): at 10:56

## 2023-10-03 RX ADMIN — SODIUM CHLORIDE 75 MILLILITER(S): 9 INJECTION, SOLUTION INTRAVENOUS at 02:45

## 2023-10-03 RX ADMIN — Medication 20 MILLIEQUIVALENT(S): at 05:24

## 2023-10-03 RX ADMIN — PIPERACILLIN AND TAZOBACTAM 200 GRAM(S): 4; .5 INJECTION, POWDER, LYOPHILIZED, FOR SOLUTION INTRAVENOUS at 05:16

## 2023-10-03 RX ADMIN — PIPERACILLIN AND TAZOBACTAM 25 GRAM(S): 4; .5 INJECTION, POWDER, LYOPHILIZED, FOR SOLUTION INTRAVENOUS at 21:11

## 2023-10-03 RX ADMIN — SODIUM CHLORIDE 500 MILLILITER(S): 9 INJECTION, SOLUTION INTRAVENOUS at 03:36

## 2023-10-03 RX ADMIN — Medication 975 MILLIGRAM(S): at 20:43

## 2023-10-03 RX ADMIN — PIPERACILLIN AND TAZOBACTAM 25 GRAM(S): 4; .5 INJECTION, POWDER, LYOPHILIZED, FOR SOLUTION INTRAVENOUS at 14:18

## 2023-10-03 RX ADMIN — Medication 40 MILLIEQUIVALENT(S): at 21:10

## 2023-10-03 RX ADMIN — ONDANSETRON 4 MILLIGRAM(S): 8 TABLET, FILM COATED ORAL at 16:28

## 2023-10-03 RX ADMIN — Medication 975 MILLIGRAM(S): at 19:43

## 2023-10-03 RX ADMIN — Medication 975 MILLIGRAM(S): at 11:26

## 2023-10-03 RX ADMIN — Medication 6.89 MICROGRAM(S)/KG/MIN: at 21:50

## 2023-10-03 RX ADMIN — Medication 325 MILLIGRAM(S): at 14:17

## 2023-10-03 NOTE — OB PROVIDER H&P - ASSESSMENT
A/P: 23y  at 16w6d GA by first trimester sono who is being admitted to the antepartum service for urosepsis.   -Admit   -Consent  -Admission labs  -Regular diet   -IV fluids  -FH daily   -Blood and urine cultures pending   -Tylenol 975mg q6h for fever   -DVT ppx: Ambulate and SCD's while in bed     Discussed with Dr. Menon

## 2023-10-03 NOTE — OB PROVIDER H&P - HISTORY OF PRESENT ILLNESS
23y  at 16w6d GA by first trimester sono who presents to ED for . Patient denies vaginal bleeding, contractions and leakage of fluid. She endorses good fetal movement. Denies fevers, chills, nausea and vomiting. No other complaints at this time.   BOBO: [birth year]  LMP: 23  Prenatal course is significant for:  1. Di/di twin gestation   2. UTI s/p Keflex     POB:  PGYN: -fibroids, -ovarian cysts, denies STD hx, denies abnormal PAPs   PMH: Denies  PSH: Denies  SH: Denies EtOH, tobacco and illicit drug use during this pregnancy; feels safe at home   Meds: PNVs  Allergies: NKDA    BMI:  Sono:  EFW:        T(C): 38.7 (10-02-23 @ 18:22), Max: 38.7 (10-02-23 @ 18:22)  HR: 118 (10-03-23 @ 00:41) (108 - 126)  BP: 93/66 (10-03-23 @ 00:41) (72/48 - 97/53)  RR: 20 (10-03-23 @ 00:41) (18 - 20)  SpO2: 100% (10-03-23 @ 00:41) (99% - 100%)  Gen: NAD, well-appearing   Abd: Soft, gravid  Ext: non-tender, non-edematous  SVE:    Bedside sono:  FHT:  [place]:           A/P:   -Admit to L&D  -Consent  -Admission labs  -NPO, except ice chips   -IV fluids  -Labor: Intact/*ROM. Latent/Active labor. Monica *.   -Fetus: Cat I tracing. Continuous toco and fetal monitoring.   -GBS: Negative, no GBS ppx required   -Analgesia:   -DVT ppx: Ambulate and SCD's while in bed     Discussed with   23y  at 16w6d GA by first trimester sono who presents to ED for . Patient denies vaginal bleeding, contractions and leakage of fluid. She endorses good fetal movement. Denies fevers, chills, nausea and vomiting. No other complaints at this time.   BOBO: 3/13/24  LMP: 23  Prenatal course is significant for:  1. Di/di twin gestation   2. UTI s/p Keflex     POB:  G1 14, full term, uncomplicated    G2 19, full term, uncomplicated    G3 SAB   PGYN: -fibroids, -ovarian cysts, denies STD hx, denies abnormal PAPs   PMH: Denies  PSH: Denies  SH: Denies EtOH, tobacco and illicit drug use during this pregnancy; feels safe at home   Meds: PNVs, aspirin   Allergies: NKDA

## 2023-10-03 NOTE — PROGRESS NOTE ADULT - PROBLEM SELECTOR PLAN 1
-Based on fever, tachycardia and hypotension   -UA positive for nitrites, leuk esterase, likely urosepsis suspected   -UCX and Blood culture pending   -WBC 8.7  -Lactate <2  -RVP negative, COVID negative   -NO uterine tenderness, CVA tenderness   -s/p Ceftriaxone q24h, now on Zosyn 2/2 fever 102.4, currently 100.4  -Strict I's and O's   -Tylenol 975mg q6h PRN for fever. -Based on fever, tachycardia and hypotension   -UA positive for nitrites, leuk esterase, likely urosepsis suspected   -UCX and Blood culture pending   -WBC 8.7  -Lactate <2  -RVP negative, COVID negative   -NO uterine tenderness, CVA tenderness   -s/p Ceftriaxone q24h, now on Zosyn 2/2 fever 102.4F, currently 100.4  -Strict I's and O's   -Tylenol 975mg q6h PRN for fever. -Based on fever, tachycardia and hypotension   -UA positive for nitrites, leuk esterase, likely urosepsis    -Urine Culture and Blood culture pending   -WBC 8.7  -Lactate <2  -RVP negative, COVID negative   -NO uterine tenderness, CVA tenderness   -s/p Ceftriaxone q24h, now on Zosyn 2/2 fever 102.4F, currently 100.4  -Strict I's and O's   -Tylenol 975mg q6h PRN for fever.

## 2023-10-03 NOTE — CONSULT NOTE ADULT - PROBLEM SELECTOR RECOMMENDATION 3
-Based on fever, tachycardia and hypotension   -UA positive, urosepsis suspected   -UCX and Blood culture pending   -WBC 8.7  -Lactate <2  -RVP negative, COVID negative   -NO uterine tenderness, CVA tenderness   -Ceftriaxone q24h, will switch to Zosyn if persistent fever within 24 hours   -Strict I's and O's   -Tylenol 975mg q6h PRN for fever

## 2023-10-03 NOTE — PATIENT PROFILE ADULT - HAVE YOU BEEN EATING POORLY BECAUSE OF A DECREASED APPETITE?
Anatomic Location From Referring Provider: left lateral mid back
X Size Of Lesion In Cm (Optional): 0
Detail Level: Detailed
Yes (1)

## 2023-10-03 NOTE — OB PROVIDER H&P - NSHPPHYSICALEXAM_GEN_ALL_CORE
T(C): 38.7 (10-02-23 @ 18:22), Max: 38.7 (10-02-23 @ 18:22)  HR: 118 (10-03-23 @ 00:41) (108 - 126)  BP: 93/66 (10-03-23 @ 00:41) (72/48 - 97/53)  RR: 20 (10-03-23 @ 00:41) (18 - 20)  SpO2: 100% (10-03-23 @ 00:41) (99% - 100%)    Gen: NAD, well-appearing   Abd: Soft, gravid, non-tender   Ext: non-tender, non-edematous  : No CVA tenderness, no uterine tenderness   SVE: Deferred   Bedside sono: AA vertex, FH 150s, fetal movement noted, BB breech, FH 170s, fetal movement noted

## 2023-10-03 NOTE — PROVIDER CONTACT NOTE (CRITICAL VALUE NOTIFICATION) - SITUATION
critical blood culture in aerobic bottle gram negative rods
Low Potassium. Lab done before treatment completed.

## 2023-10-03 NOTE — OB PROVIDER H&P - NSHPLABSRESULTS_GEN_ALL_CORE
10.2   8.70  )-----------( 248      ( 02 Oct 2023 20:20 )             30.3     10-02    131<L>  |  96  |  4.2<L>  ----------------------------<  88  3.2<L>   |  18.0<L>  |  0.38<L>    Ca    9.2      02 Oct 2023 20:20    TPro  6.8  /  Alb  3.7  /  TBili  0.6  /  DBili  x   /  AST  15  /  ALT  9   /  AlkPhos  56  10-02    Blood Gas Venous - Lactate: 1.30 mmol/L (10.02.23 @ 20:20)

## 2023-10-03 NOTE — PROGRESS NOTE ADULT - PROBLEM SELECTOR PLAN 2
-FH daily   -Up to date on prenatals   -GBS unknown  -9/20 MFM sono: AA: inferior transverse, posterior placenta, EFW: 137g 91%ile, BB: superior, anterior, EFW:123g 66%ile -FH daily   -Up to date on prenatals   -GBS unknown as pt is 16w6d  -9/20 MFM sono: AA: inferior transverse, posterior placenta, EFW: 137g 91%ile, BB: superior, anterior, EFW:123g 66%ile; discussed with Dr. Rogers, pt will need outpatient follow up 1 week after discharge for cervical lenght/possible anatomy scan -FH daily   -Up to date on prenatals   -GBS unknown as pt is 16w6d  -9/20 MFM sonogram: AA: inferior transverse, posterior placenta, EFW: 137g 91%ile, BB: superior, anterior, EFW:123g 66%ile; pt will need outpatient follow up in approximately 1 week after discharge for cervical length and fetal anatomy scan

## 2023-10-03 NOTE — PROGRESS NOTE ADULT - SUBJECTIVE AND OBJECTIVE BOX
23y  at 16w6d GA by first trimester claribel who is being admitted to the antepartum service for urosepsis    Reports nausea, just received zofran  No other complaints at this time.    Vital Signs Last 24 Hrs  T(C): 39.1 (03 Oct 2023 03:19), Max: 39.1 (03 Oct 2023 03:19)  T(F): 102.4 (03 Oct 2023 03:19), Max: 102.4 (03 Oct 2023 03:19)  HR: 108 (03 Oct 2023 03:19) (108 - 126)  BP: 83/56 (03 Oct 2023 03:19) (72/48 - 97/53)  RR: 21 (03 Oct 2023 03:19) (18 - 21)  SpO2: 100% (03 Oct 2023 03:19) (99% - 100%)    Parameters below as of 03 Oct 2023 03:19  Patient On (Oxygen Delivery Method): room air    Gen: NAD, well-appearing   Abd: Soft, gravid, non-tender   Ext: non-tender, non-edematous  : No CVA tenderness, no uterine tenderness   SVE: Deferred       LABS:                        10.2   8.70  )-----------( 248      ( 02 Oct 2023 20:20 )             30.3   10-    131<L>  |  96  |  4.2<L>  ----------------------------<  88  3.2<L>   |  18.0<L>  |  0.38<L>    Ca    9.2      02 Oct 2023 20:20    TPro  6.8  /  Alb  3.7  /  TBili  0.6  /  DBili  x   /  AST  15  /  ALT  9   /  AlkPhos  56  10     23y  at 16w6d GA by first trimester sonogram. She was hospitalized for urosepsis.    Reports having nausea, just received Zofran  No other complaints at this time.    Vital Signs Last 24 Hrs  T(C): 39.1 (03 Oct 2023 03:19), Max: 39.1 (03 Oct 2023 03:19)  T(F): 102.4 (03 Oct 2023 03:19), Max: 102.4 (03 Oct 2023 03:19)  HR: 108 (03 Oct 2023 03:19) (108 - 126)  BP: 83/56 (03 Oct 2023 03:19) (72/48 - 97/53)  RR: 21 (03 Oct 2023 03:19) (18 - 21)  SpO2: 100% (03 Oct 2023 03:19) (99% - 100%)    Parameters below as of 03 Oct 2023 03:19  Patient On (Oxygen Delivery Method): room air    Gen: NAD, well-appearing   Abd: Soft, gravid, non-tender   Ext: non-tender, non-edematous  : No CVA tenderness, no uterine tenderness   SVE: Deferred     LABS:                        10.2   8.70  )-----------( 248      ( 02 Oct 2023 20:20 )             30.3   10-02    131<L>  |  96  |  4.2<L>  ----------------------------<  88  3.2<L>   |  18.0<L>  |  0.38<L>    Ca    9.2      02 Oct 2023 20:20    TPro  6.8  /  Alb  3.7  /  TBili  0.6  /  DBili  x   /  AST  15  /  ALT  9   /  AlkPhos  56  10-02

## 2023-10-03 NOTE — PROGRESS NOTE ADULT - PROBLEM SELECTOR PLAN 6
3.2>2.7>repleted K with 20meq x 2 doses, did not yet receive full course of repletion  will repeat BMP this afternoon  continue to replete as needed 3.2>2.7>replete K with 20meq x 2 doses, will repeat BMP this afternoon, and continue to replete as needed

## 2023-10-03 NOTE — PROGRESS NOTE ADULT - PROBLEM SELECTOR PLAN 4
-Asymptomatic   -Hgb 10.2  -Iron studies ordered for AM. -Asymptomatic   -Hgb 10.2, PO iron sulfate, while inpatient will consider venofer  -Iron studies ordered for AM. -Asymptomatic   -Hgb 10.2, PO iron sulfate  -Iron studies ordered

## 2023-10-03 NOTE — PATIENT PROFILE ADULT - FALL HARM RISK - UNIVERSAL INTERVENTIONS
Bed in lowest position, wheels locked, appropriate side rails in place/Call bell, personal items and telephone in reach/Instruct patient to call for assistance before getting out of bed or chair/Non-slip footwear when patient is out of bed/[place] to call system/Physically safe environment - no spills, clutter or unnecessary equipment/Purposeful Proactive Rounding/Room/bathroom lighting operational, light cord in reach

## 2023-10-03 NOTE — CONSULT NOTE ADULT - ASSESSMENT
Assessment/Plan   Patient is a 22yo , no significant PMHx admitted for sepsis 2/2 UTI.    #UTI  #Sepsis  #Pregnant    - Additional 1500cc Bolus  - Continue zosyn  - Increase maintenance fluids to 200/hr  - Renal ultrasound   Assessment/Plan   Patient is a 24yo , no significant PMHx admitted for sepsis 2/2 UTI.    #UTI  - >100,000 CFU E coli  #Sepsis  - +GNR  #Pregnant  - MFM following  - 16w6d    - Additional 1500cc Bolus  - Continue zosyn for broad coverage  - Follow up Blood and Urine cultures  - Increase maintenance fluids to 150/hr  - Renal ultrasound to evaluate for stones/hydronephrosis  - Monitor and replete lytes K>4, Phos>3, Mag>2  - Tylenol PRN for pain/fever    Diet: Regular  Dispo: MICU   Full Code    Plan Discussed with MICU Attending: Dr. Figueroa Greer

## 2023-10-03 NOTE — CONSULT NOTE ADULT - ASSESSMENT
A/P: 23y  at 16w6d GA by first trimester sono who is being admitted to the antepartum service for urosepsis.     Discussed with Dr. Rose and Dr. Reno

## 2023-10-03 NOTE — PROGRESS NOTE ADULT - PROBLEM SELECTOR PLAN 3
as above At Increased risk for  morbidity and mortality due to increased risk for  delivery. Also at increased risk for maternal anemia At Increased risk for  morbidity and mortality due to increased risk for  delivery. They are also at risk for intrauterine growth restriction. There is also an increased risk for maternal anemia, gestational diabetes, urinary tract infections, and postpartum hemorrhage At Increased risk for  morbidity and mortality due to  delivery. Twin pregnancies are also at risk for intrauterine fetal growth restriction. There is also an increased risk for maternal anemia, gestational diabetes, urinary tract infections, and postpartum hemorrhage

## 2023-10-03 NOTE — PROGRESS NOTE ADULT - ASSESSMENT
A/P: 23y  at 16w6d GA by first trimester sono who is being admitted to the antepartum service for urosepsis HD#1 A/P: 23y  at 16w6d GA by first trimester sonogram. Hospitalized for having urosepsis during pregnancy. HD#1

## 2023-10-03 NOTE — CONSULT NOTE ADULT - ATTENDING COMMENTS
late entry: Patient seen and examined multiple times on day of service of October 3 with ICU team    23-year-old  female with no medical history admitted with abdominal pain nausea vomiting fever to OB/GYN service she is 16 weeks pregnant with twin pregnancy with hospital course complicated by severe hypotension with systolic in 70s for which she is being admitted to medical ICU for sepsis secondary to E. coli bacteremia most likely secondary to complicated UTI With metabolic acidosis hypokalemia     antibiotics broadened to Zosyn 3.375 g every 8 hours, awaiting blood and urine culture finalization  TTE  Giving 2-1/2 L bolus of IV crystalloid followed by maintenance IV crystalloid at 100 cc/h with close monitoring of LFTs, lactate and renal function as  well his urine output and mentation with capillary refill   renal ultrasound  Fetal monitoring as per [facility]  Low threshold to start Levophed, will avoid vasopressin and midodrine     plan discussed with patient, infectious disease as well as ICU team

## 2023-10-04 DIAGNOSIS — R10.9 UNSPECIFIED ABDOMINAL PAIN: ICD-10-CM

## 2023-10-04 DIAGNOSIS — E87.1 HYPO-OSMOLALITY AND HYPONATREMIA: ICD-10-CM

## 2023-10-04 DIAGNOSIS — E83.42 HYPOMAGNESEMIA: ICD-10-CM

## 2023-10-04 DIAGNOSIS — Z3A.17 17 WEEKS GESTATION OF PREGNANCY: ICD-10-CM

## 2023-10-04 LAB
ALBUMIN SERPL ELPH-MCNC: 3.1 G/DL — LOW (ref 3.3–5.2)
ALP SERPL-CCNC: 56 U/L — SIGNIFICANT CHANGE UP (ref 40–120)
ALT FLD-CCNC: 15 U/L — SIGNIFICANT CHANGE UP
ANION GAP SERPL CALC-SCNC: 13 MMOL/L — SIGNIFICANT CHANGE UP (ref 5–17)
ANION GAP SERPL CALC-SCNC: 16 MMOL/L — SIGNIFICANT CHANGE UP (ref 5–17)
AST SERPL-CCNC: 25 U/L — SIGNIFICANT CHANGE UP
BASOPHILS # BLD AUTO: 0.02 K/UL — SIGNIFICANT CHANGE UP (ref 0–0.2)
BASOPHILS NFR BLD AUTO: 0.2 % — SIGNIFICANT CHANGE UP (ref 0–2)
BILIRUB DIRECT SERPL-MCNC: 0.2 MG/DL — SIGNIFICANT CHANGE UP (ref 0–0.3)
BILIRUB INDIRECT FLD-MCNC: 0.3 MG/DL — SIGNIFICANT CHANGE UP (ref 0.2–1)
BILIRUB SERPL-MCNC: 0.5 MG/DL — SIGNIFICANT CHANGE UP (ref 0.4–2)
BUN SERPL-MCNC: <3 MG/DL — LOW (ref 8–20)
BUN SERPL-MCNC: <3 MG/DL — LOW (ref 8–20)
CALCIUM SERPL-MCNC: 8 MG/DL — LOW (ref 8.4–10.5)
CALCIUM SERPL-MCNC: 8.4 MG/DL — SIGNIFICANT CHANGE UP (ref 8.4–10.5)
CHLORIDE SERPL-SCNC: 105 MMOL/L — SIGNIFICANT CHANGE UP (ref 96–108)
CHLORIDE SERPL-SCNC: 99 MMOL/L — SIGNIFICANT CHANGE UP (ref 96–108)
CO2 SERPL-SCNC: 15 MMOL/L — LOW (ref 22–29)
CO2 SERPL-SCNC: 17 MMOL/L — LOW (ref 22–29)
CREAT SERPL-MCNC: 0.26 MG/DL — LOW (ref 0.5–1.3)
CREAT SERPL-MCNC: 0.3 MG/DL — LOW (ref 0.5–1.3)
EGFR: 151 ML/MIN/1.73M2 — SIGNIFICANT CHANGE UP
EGFR: 156 ML/MIN/1.73M2 — SIGNIFICANT CHANGE UP
EOSINOPHIL # BLD AUTO: 0 K/UL — SIGNIFICANT CHANGE UP (ref 0–0.5)
EOSINOPHIL NFR BLD AUTO: 0 % — SIGNIFICANT CHANGE UP (ref 0–6)
GLUCOSE SERPL-MCNC: 105 MG/DL — HIGH (ref 70–99)
GLUCOSE SERPL-MCNC: 117 MG/DL — HIGH (ref 70–99)
HCT VFR BLD CALC: 26.4 % — LOW (ref 34.5–45)
HGB BLD-MCNC: 9.2 G/DL — LOW (ref 11.5–15.5)
IMM GRANULOCYTES NFR BLD AUTO: 0.2 % — SIGNIFICANT CHANGE UP (ref 0–0.9)
LACTATE SERPL-SCNC: 0.9 MMOL/L — SIGNIFICANT CHANGE UP (ref 0.5–2)
LYMPHOCYTES # BLD AUTO: 0.85 K/UL — LOW (ref 1–3.3)
LYMPHOCYTES # BLD AUTO: 8.9 % — LOW (ref 13–44)
MAGNESIUM SERPL-MCNC: 1.4 MG/DL — LOW (ref 1.6–2.6)
MAGNESIUM SERPL-MCNC: 2.2 MG/DL — SIGNIFICANT CHANGE UP (ref 1.6–2.6)
MCHC RBC-ENTMCNC: 30.6 PG — SIGNIFICANT CHANGE UP (ref 27–34)
MCHC RBC-ENTMCNC: 34.8 GM/DL — SIGNIFICANT CHANGE UP (ref 32–36)
MCV RBC AUTO: 87.7 FL — SIGNIFICANT CHANGE UP (ref 80–100)
MONOCYTES # BLD AUTO: 0.53 K/UL — SIGNIFICANT CHANGE UP (ref 0–0.9)
MONOCYTES NFR BLD AUTO: 5.6 % — SIGNIFICANT CHANGE UP (ref 2–14)
MRSA PCR RESULT.: SIGNIFICANT CHANGE UP
NEUTROPHILS # BLD AUTO: 8.12 K/UL — HIGH (ref 1.8–7.4)
NEUTROPHILS NFR BLD AUTO: 85.1 % — HIGH (ref 43–77)
PHOSPHATE SERPL-MCNC: 2 MG/DL — LOW (ref 2.4–4.7)
PHOSPHATE SERPL-MCNC: 2.9 MG/DL — SIGNIFICANT CHANGE UP (ref 2.4–4.7)
PLATELET # BLD AUTO: 203 K/UL — SIGNIFICANT CHANGE UP (ref 150–400)
POTASSIUM SERPL-MCNC: 3.1 MMOL/L — LOW (ref 3.5–5.3)
POTASSIUM SERPL-MCNC: 4.5 MMOL/L — SIGNIFICANT CHANGE UP (ref 3.5–5.3)
POTASSIUM SERPL-SCNC: 3.1 MMOL/L — LOW (ref 3.5–5.3)
POTASSIUM SERPL-SCNC: 4.5 MMOL/L — SIGNIFICANT CHANGE UP (ref 3.5–5.3)
PROT SERPL-MCNC: 6 G/DL — LOW (ref 6.6–8.7)
RBC # BLD: 3.01 M/UL — LOW (ref 3.8–5.2)
RBC # FLD: 13.5 % — SIGNIFICANT CHANGE UP (ref 10.3–14.5)
S AUREUS DNA NOSE QL NAA+PROBE: SIGNIFICANT CHANGE UP
SODIUM SERPL-SCNC: 130 MMOL/L — LOW (ref 135–145)
SODIUM SERPL-SCNC: 135 MMOL/L — SIGNIFICANT CHANGE UP (ref 135–145)
WBC # BLD: 9.54 K/UL — SIGNIFICANT CHANGE UP (ref 3.8–10.5)
WBC # FLD AUTO: 9.54 K/UL — SIGNIFICANT CHANGE UP (ref 3.8–10.5)

## 2023-10-04 PROCEDURE — 99223 1ST HOSP IP/OBS HIGH 75: CPT

## 2023-10-04 PROCEDURE — 99233 SBSQ HOSP IP/OBS HIGH 50: CPT | Mod: GC

## 2023-10-04 PROCEDURE — 99232 SBSQ HOSP IP/OBS MODERATE 35: CPT | Mod: GC

## 2023-10-04 PROCEDURE — 72195 MRI PELVIS W/O DYE: CPT | Mod: 26

## 2023-10-04 PROCEDURE — 99233 SBSQ HOSP IP/OBS HIGH 50: CPT

## 2023-10-04 PROCEDURE — 74181 MRI ABDOMEN W/O CONTRAST: CPT | Mod: 26

## 2023-10-04 RX ORDER — HYDROMORPHONE HYDROCHLORIDE 2 MG/ML
2 INJECTION INTRAMUSCULAR; INTRAVENOUS; SUBCUTANEOUS EVERY 6 HOURS
Refills: 0 | Status: DISCONTINUED | OUTPATIENT
Start: 2023-10-04 | End: 2023-10-05

## 2023-10-04 RX ORDER — MAGNESIUM SULFATE 500 MG/ML
4 VIAL (ML) INJECTION ONCE
Refills: 0 | Status: COMPLETED | OUTPATIENT
Start: 2023-10-04 | End: 2023-10-04

## 2023-10-04 RX ORDER — POTASSIUM CHLORIDE 20 MEQ
40 PACKET (EA) ORAL ONCE
Refills: 0 | Status: COMPLETED | OUTPATIENT
Start: 2023-10-04 | End: 2023-10-04

## 2023-10-04 RX ORDER — POTASSIUM PHOSPHATE, MONOBASIC POTASSIUM PHOSPHATE, DIBASIC 236; 224 MG/ML; MG/ML
30 INJECTION, SOLUTION INTRAVENOUS ONCE
Refills: 0 | Status: COMPLETED | OUTPATIENT
Start: 2023-10-04 | End: 2023-10-04

## 2023-10-04 RX ORDER — DEXTROSE MONOHYDRATE, SODIUM CHLORIDE, AND POTASSIUM CHLORIDE 50; .745; 4.5 G/1000ML; G/1000ML; G/1000ML
1000 INJECTION, SOLUTION INTRAVENOUS
Refills: 0 | Status: DISCONTINUED | OUTPATIENT
Start: 2023-10-04 | End: 2023-10-04

## 2023-10-04 RX ORDER — POTASSIUM CHLORIDE 20 MEQ
40 PACKET (EA) ORAL EVERY 4 HOURS
Refills: 0 | Status: COMPLETED | OUTPATIENT
Start: 2023-10-04 | End: 2023-10-04

## 2023-10-04 RX ORDER — PHENAZOPYRIDINE HCL 100 MG
100 TABLET ORAL EVERY 8 HOURS
Refills: 0 | Status: ACTIVE | OUTPATIENT
Start: 2023-10-04 | End: 2024-09-01

## 2023-10-04 RX ORDER — SODIUM CHLORIDE 9 MG/ML
1000 INJECTION, SOLUTION INTRAVENOUS ONCE
Refills: 0 | Status: DISCONTINUED | OUTPATIENT
Start: 2023-10-04 | End: 2023-10-04

## 2023-10-04 RX ORDER — NOREPINEPHRINE BITARTRATE/D5W 8 MG/250ML
0.05 PLASTIC BAG, INJECTION (ML) INTRAVENOUS
Qty: 8 | Refills: 0 | Status: DISCONTINUED | OUTPATIENT
Start: 2023-10-04 | End: 2023-10-05

## 2023-10-04 RX ORDER — MAGNESIUM SULFATE 500 MG/ML
2 VIAL (ML) INJECTION ONCE
Refills: 0 | Status: COMPLETED | OUTPATIENT
Start: 2023-10-04 | End: 2023-10-04

## 2023-10-04 RX ADMIN — PIPERACILLIN AND TAZOBACTAM 25 GRAM(S): 4; .5 INJECTION, POWDER, LYOPHILIZED, FOR SOLUTION INTRAVENOUS at 21:52

## 2023-10-04 RX ADMIN — CHLORHEXIDINE GLUCONATE 1 APPLICATION(S): 213 SOLUTION TOPICAL at 05:25

## 2023-10-04 RX ADMIN — Medication 975 MILLIGRAM(S): at 10:32

## 2023-10-04 RX ADMIN — Medication 25 GRAM(S): at 06:01

## 2023-10-04 RX ADMIN — Medication 975 MILLIGRAM(S): at 11:06

## 2023-10-04 RX ADMIN — Medication 1 TABLET(S): at 13:10

## 2023-10-04 RX ADMIN — Medication 975 MILLIGRAM(S): at 23:34

## 2023-10-04 RX ADMIN — HYDROMORPHONE HYDROCHLORIDE 2 MILLIGRAM(S): 2 INJECTION INTRAMUSCULAR; INTRAVENOUS; SUBCUTANEOUS at 15:00

## 2023-10-04 RX ADMIN — Medication 975 MILLIGRAM(S): at 03:09

## 2023-10-04 RX ADMIN — Medication 100 MILLIGRAM(S): at 13:10

## 2023-10-04 RX ADMIN — Medication 40 MILLIEQUIVALENT(S): at 06:01

## 2023-10-04 RX ADMIN — POTASSIUM PHOSPHATE, MONOBASIC POTASSIUM PHOSPHATE, DIBASIC 83.33 MILLIMOLE(S): 236; 224 INJECTION, SOLUTION INTRAVENOUS at 09:25

## 2023-10-04 RX ADMIN — Medication 25 GRAM(S): at 09:26

## 2023-10-04 RX ADMIN — Medication 6.89 MICROGRAM(S)/KG/MIN: at 22:06

## 2023-10-04 RX ADMIN — Medication 40 MILLIEQUIVALENT(S): at 09:25

## 2023-10-04 RX ADMIN — DEXTROSE MONOHYDRATE, SODIUM CHLORIDE, AND POTASSIUM CHLORIDE 75 MILLILITER(S): 50; .745; 4.5 INJECTION, SOLUTION INTRAVENOUS at 09:25

## 2023-10-04 RX ADMIN — PIPERACILLIN AND TAZOBACTAM 25 GRAM(S): 4; .5 INJECTION, POWDER, LYOPHILIZED, FOR SOLUTION INTRAVENOUS at 05:23

## 2023-10-04 RX ADMIN — Medication 325 MILLIGRAM(S): at 11:20

## 2023-10-04 RX ADMIN — Medication 100 MILLIGRAM(S): at 21:49

## 2023-10-04 RX ADMIN — HYDROMORPHONE HYDROCHLORIDE 2 MILLIGRAM(S): 2 INJECTION INTRAMUSCULAR; INTRAVENOUS; SUBCUTANEOUS at 14:32

## 2023-10-04 RX ADMIN — Medication 975 MILLIGRAM(S): at 02:09

## 2023-10-04 RX ADMIN — PIPERACILLIN AND TAZOBACTAM 25 GRAM(S): 4; .5 INJECTION, POWDER, LYOPHILIZED, FOR SOLUTION INTRAVENOUS at 13:10

## 2023-10-04 RX ADMIN — Medication 40 MILLIEQUIVALENT(S): at 10:33

## 2023-10-04 NOTE — PROGRESS NOTE ADULT - ASSESSMENT
23y  at 16w6d GA by first trimester sono who presents to ED for . Patient denies vaginal bleeding, contractions and leakage of fluid. She endorses good fetal movement. Denies fevers, chills, nausea and vomiting. No other complaints at this time. PT [place] SPEAKING IN US ABOUT 6  MONTHS   BOBO: 3/13/24  LMP: 23  Prenatal course is significant for:  1. Di/di twin gestation   2. UTI s/p Keflex    AS ABOVE 16WEEK PREGNANT WITH FEVERS CHILLS  BLOOD CX ECOLI  BP IS LOW  PT IS ILL Appearing  IN ICU ON PRESSORS     CONCERN FOR PYELONEPHRITIS    S   CAN CONITNUE IV ZOSYN     NEEDS RENAL IMAGING   TO EVALUATE  WILL FOLLOWUP

## 2023-10-04 NOTE — CHART NOTE - NSCHARTNOTEFT_GEN_A_CORE
Pt with urosepsis  pt in septic shock, remains on levo  Pt needs emergent imaging to r/o urologic obstruction  Radiologist, Dr. Garcia doesn't want to perform CT, wants MRI w/o contrast to be performed  I spoke with Dr. Greer in MICU, MRI noncontrast to be ordered  needs to be performed emergently Alert-The patient is alert, awake and responds to voice. The patient is oriented to time, place, and person. The triage nurse is able to obtain subjective information.

## 2023-10-04 NOTE — PROGRESS NOTE ADULT - PROBLEM SELECTOR PLAN 1
-Based on fever, tachycardia and hypotension. Now admitted to MICU.   -UA positive for nitrites, leuk esterase, likely urosepsis    -Urine Culture and Blood culture pending   -WBC 8.7  -Lactate <2  -RVP negative, COVID negative   -No uterine tenderness, +CVA tenderness   -s/p Ceftriaxone q24h, now on Zosyn. Overnight tmax 103.3.  -Strict I's and O's   -Tylenol 975mg q6h PRN for fever.

## 2023-10-04 NOTE — PROGRESS NOTE ADULT - ASSESSMENT
23y  at 17wks GA by first trimester sonogram. She is hospitalized for urosepsis. Currently on Zosyn. Admitted to MICU overnight requiring pressor support.  23y  at 17wks GA by first trimester sonogram. She is hospitalized for urosepsis. Currently on Zosyn. Admitted to MICU overnight requiring pressor support.     Plan:     Urosepsis:   - Blood cultures, 10/2, E. Coli   - UC, 10/2, E. Coli, sensitivities resulted   - Rocephin, DCd 10/3  - Placed on Zosyn 10/3  - RVP/COVID 19 PCR, negative   - UA, leukocyte, nitrite, bacteria, WBCs+   - US renal, b/l hydronephrosis   - Hypotensive, tachycardic  - placed on norepinephrine, currently 0.19 meq/kg/hr   - MFM consulted, following recs   - CT renal stone hunt w/o contrast, ordered   - Urology consulted, pending recs   - Continue Zosyn  - Trend Fever  - Trend WBC    Pregnancy   - At Increased risk for  morbidity and mortality due to  delivery. Twin pregnancies are also at risk for intrauterine fetal growth restriction. There is also an increased risk for maternal anemia, gestational diabetes, urinary tract infections, and postpartum hemorrhage.  -FH daily 150's/140's today   -Up to date on prenatals   - MFM sonogram: AA: inferior transverse, posterior placenta, EFW: 137g 91%ile, BB: superior, anterior, EFW:123g 66%ile; pt will need outpatient follow up in approximately 1 week after discharge for cervical length and fetal anatomy scan.    Hypo K/Na/Mg  - 3.1 K on AM CMP   - 40 meq K+ PO powder, 20 meq K+ IV    - 131 Na on AM CMP   - LR changes to NS 1L 75 ml/hr  - 1.4 AM CMP   - 6 mg IVPB over 6 hrs  - revaluate 1300    Flank Pain:   -  mg PO q8 PRN   - Dilaudid 2 mg PO q6 PRN   - Tylenol 975 mg PO q6 PRN

## 2023-10-04 NOTE — CONSULT NOTE ADULT - ASSESSMENT
22 yo female 16 weeks pregnant with urosepsis, suspected pyelonephritis, septic shock, bacteremia  - needs emergent CT renal stone hunt to r/o obstruction  - cont day  - IVF  - abx  - keep pt npo  - if pt has ureteral stone, will need emergent stent placement, if that is the case, the pt will require stent changes [id number] during pregnancy  - will follow closely  - s/w CT concerning emergent need for CT

## 2023-10-04 NOTE — PROGRESS NOTE ADULT - PROBLEM SELECTOR PLAN 3
-FH daily 150's/140's today   -Up to date on prenatals   -9/20 MF sonogram: AA: inferior transverse, posterior placenta, EFW: 137g 91%ile, BB: superior, anterior, EFW:123g 66%ile; pt will need outpatient follow up in approximately 1 week after discharge for cervical length and fetal anatomy scan.

## 2023-10-04 NOTE — PROGRESS NOTE ADULT - SUBJECTIVE AND OBJECTIVE BOX
INTERVAL HPI/OVERNIGHT EVENTS:    HPI  23 F No PMHx. Recently came here from [facility] 6 months ago. [place]. No pregnancy complications either in this pregnancy or previous pregnancies. Patient follows with [facility] because current pregnancy is Di-Di twins, high risk. Pregnant, 17 weeks,  presented to the ED complaining of abdominal pain, flank pain b/l, nausea, vomiting, and fever on 10/2. Admitted to [facility] service for sepsis 2/2 UTI. Initially started on rocephin, broadened to Zosyn last night after patient spiked a fever to 102.4F at 3am. 10/3 persistently tachy, hypotensive to 70s/40s. Was on maintenance fluids 125LR and got 1L bolus with no improvement.     Overnight: admitted to ICU for hypotension 2/2 septic shock 2/2 urosepsis. On norepinephrine and LR. Also, found to be hypokalemic, hypomagnesemia, electrolytes replaced.     SUBJECTIVE: Patient seen and examined at bedside. Complaining of b/l flank pain, fevers, chills. Denies, dysuria, hematuria, change in frequency, stones or history of stones. US renal resulted, b/l hydronephrosis. Hyponatremic, hypokalemic, hypomagnesemia, electrolytes replaced. [facility] consulted, recommended Meadows Dilaudid and AZO placed due to pain after Meadows placement. Urology consulted due to b/l hydronephrosis, CT renal stone hunt w/o contrast.     ROS: All negative except as listed above.    VITAL SIGNS:  ICU Vital Signs Last 24 Hrs  T(C): 38.3 (04 Oct 2023 11:45), Max: 39.6 (04 Oct 2023 02:15)  T(F): 100.9 (04 Oct 2023 11:45), Max: 103.3 (04 Oct 2023 02:15)  HR: 91 (04 Oct 2023 11:45) (71 - 122)  BP: 106/57 (04 Oct 2023 11:45) (74/48 - 134/62)  BP(mean): 71 (04 Oct 2023 11:45) (55 - 99)  ABP: --  ABP(mean): --  RR: 11 (04 Oct 2023 11:45) (10 - 29)  SpO2: 99% (04 Oct 2023 11:45) (96% - 100%)    O2 Parameters below as of 04 Oct 2023 07:00  Patient On (Oxygen Delivery Method): room air            Plateau pressure:   P/F ratio:     10-03 @ :  -  10-04 @ 07:00  --------------------------------------------------------  IN: 6666.8 mL / OUT: 0 mL / NET: 6666.8 mL    10-04 @ :  -  10-04 @ 12:24  --------------------------------------------------------  IN: 964.5 mL / OUT: 202 mL / NET: 762.5 mL      CAPILLARY BLOOD GLUCOSE      POCT Blood Glucose.: 77 mg/dL (03 Oct 2023 15:49)      ECG: reviewed.    PHYSICAL EXAM:    GENERAL: NAD, lying in bed comfortably  HEAD:  Atraumatic, normocephalic  EYES: EOMI, PERRLA, conjunctiva and sclera clear  NECK: Supple, trachea midline, no JVD  HEART: Regular rate and rhythm, no murmurs, rubs, or gallops  LUNGS: Unlabored respirations.  Clear to auscultation bilaterally, no crackles, wheezing, or rhonchi  ABDOMEN: Soft, nontender, gravid, +BS.    : B/l flank tenderness and CVA tenderness. Meadows in place, draining yellow non-turbid urine.   EXTREMITIES: 2+ peripheral pulses bilaterally, cap refill<2 secs. No clubbing, cyanosis, or edema  NERVOUS SYSTEM:  A&Ox3, following commands, moving all extremities, no focal deficits   SKIN: No rashes or lesions    MEDICATIONS:  MEDICATIONS  (STANDING):  chlorhexidine 2% Cloths 1 Application(s) Topical <User Schedule>  ferrous    sulfate 325 milliGRAM(s) Oral daily  influenza   Vaccine 0.5 milliLiter(s) IntraMuscular once  norepinephrine Infusion 0.05 MICROgram(s)/kG/Min (6.89 mL/Hr) IV Continuous <Continuous>  phenazopyridine 100 milliGRAM(s) Oral every 8 hours  piperacillin/tazobactam IVPB.. 3.375 Gram(s) IV Intermittent every 8 hours  prenatal multivitamin 1 Tablet(s) Oral daily  sodium chloride 0.9% with potassium chloride 20 mEq/L 1000 milliLiter(s) (75 mL/Hr) IV Continuous <Continuous>    MEDICATIONS  (PRN):  acetaminophen     Tablet .. 975 milliGRAM(s) Oral every 6 hours PRN Temp greater or equal to 38C (100.4F), Mild Pain (1 - 3)  HYDROmorphone   Tablet 2 milliGRAM(s) Oral every 6 hours PRN Moderate Pain (4 - 6)      ALLERGIES:  Allergies    No Known Allergies    Intolerances        LABS:                        9.2    9.54  )-----------( 203      ( 04 Oct 2023 03:57 )             26.4     10-04    130<L>  |  99  |  <3.0<L>  ----------------------------<  105<H>  3.1<L>   |  15.0<L>  |  0.30<L>    Ca    8.4      04 Oct 2023 03:57  Phos  2.0     10-04  Mg     1.4     10-    TPro  6.8  /  Alb  3.7  /  TBili  0.6  /  DBili  x   /  AST  15  /  ALT  9   /  AlkPhos  56  10-02    PT/INR - ( 02 Oct 2023 20:20 )   PT: 12.3 sec;   INR: 1.11 ratio         PTT - ( 03 Oct 2023 20:00 )  PTT:32.2 sec  Urinalysis Basic - ( 04 Oct 2023 03:57 )    Color: x / Appearance: x / SG: x / pH: x  Gluc: 105 mg/dL / Ketone: x  / Bili: x / Urobili: x   Blood: x / Protein: x / Nitrite: x   Leuk Esterase: x / RBC: x / WBC x   Sq Epi: x / Non Sq Epi: x / Bacteria: x        Micro:    Culture - Blood (collected 10-02-23 @ 20:08)  Source: .Blood Blood-Peripheral  Gram Stain (10-03-23 @ 13:14):    Growth in aerobic bottle: Gram Negative Rods  Preliminary Report (10-03-23 @ 13:14):    Growth in aerobic bottle: Gram Negative Rods    Direct identification is available within approximately 3-5    hours either by Blood Panel Multiplexed PCR or Direct    MALDI-TOF. Details: [email or web address]  Organism: Blood Culture PCR (10-03-23 @ 15:45)  Organism: Blood Culture PCR (10-03-23 @ 15:45)      Method Type: PCR      -  Escherichia coli: Detec    Culture - Blood (collected 10-02-23 @ 20:00)  Source: .Blood Blood-Peripheral  Preliminary Report (10-04-23 @ 02:02):    No growth at 24 hours          RADIOLOGY & ADDITIONAL TESTS: Reviewed.

## 2023-10-04 NOTE — CHART NOTE - NSCHARTNOTEFT_GEN_A_CORE
Pt with urosepsis, 16 weeks gravid with di/di twins    MRI:< from: MR Pelvis No Cont (10.04.23 @ 17:52) >    IMPRESSION: Moderate right and mild to moderate left   hydroureteronephrosis secondary to mass effect of the gravid uterus upon   the proximal to mid ureters upon crossing the psoas muscles. Mild right   perinephric stranding. No evidence of ureteral stone. No evidence of   renal abscess. Results discussedwith Dr. Oseguera at time of interpretation.    pt with pyelonephritis, no stones  cont IVabx for bacteremia  day for UO monitoring  no need for ureteral stent

## 2023-10-04 NOTE — PROGRESS NOTE ADULT - SUBJECTIVE AND OBJECTIVE BOX
INFECTIOUS DISEASES AND INTERNAL MEDICINE at [facility]  =======================================================  Arnav Morales MD  Diplomates American Board of Internal Medicine and Infectious Diseases  Telephone [phone]  Fax            [phone]  =======================================================    ASHISH YANES [id number]    Follow up: ECOLI BACTEREMIA HYPOTENSION     Allergies:  No Known Allergies      Medications:  acetaminophen     Tablet .. 975 milliGRAM(s) Oral every 6 hours PRN  chlorhexidine 2% Cloths 1 Application(s) Topical <User Schedule>  ferrous    sulfate 325 milliGRAM(s) Oral daily  HYDROmorphone   Tablet 2 milliGRAM(s) Oral every 6 hours PRN  influenza   Vaccine 0.5 milliLiter(s) IntraMuscular once  norepinephrine Infusion 0.05 MICROgram(s)/kG/Min IV Continuous <Continuous>  phenazopyridine 100 milliGRAM(s) Oral every 8 hours  piperacillin/tazobactam IVPB.. 3.375 Gram(s) IV Intermittent every 8 hours  prenatal multivitamin 1 Tablet(s) Oral daily  sodium chloride 0.9% with potassium chloride 20 mEq/L 1000 milliLiter(s) IV Continuous <Continuous>    SOCIAL       FAMILY   FAMILY HISTORY:    REVIEW OF SYSTEMS:  CONSTITUTIONAL:  No Fever or chills  HEENT:   No diplopia or blurred vision.  No earache, sore throat or runny nose.  CARDIOVASCULAR:  No pressure, squeezing, strangling, tightness, heaviness or aching about the chest, neck, axilla or epigastrium.  RESPIRATORY:  No cough, shortness of breath, PND or orthopnea.  GASTROINTESTINAL:   nausea,    GENITOURINARY:  AS PER HPI   MUSCULOSKELETAL:   moves all joints  SKIN:  No change in skin, hair or nails.  NEUROLOGIC:  No paresthesias, fasciculations, seizures or weakness.  PSYCHIATRIC:  No disorder of thought or mood.  ENDOCRINE:  No heat or cold intolerance, polyuria or polydipsia.  HEMATOLOGICAL:  No easy bruising or bleeding.            Physical Exam:  ICU Vital Signs Last 24 Hrs  T(C): 36 (04 Oct 2023 16:15), Max: 39.6 (04 Oct 2023 02:15)  T(F): 96.8 (04 Oct 2023 16:15), Max: 103.3 (04 Oct 2023 02:15)  HR: 65 (04 Oct 2023 18:00) (60 - 115)  BP: 115/80 (04 Oct 2023 18:00) (72/45 - 134/62)  BP(mean): 91 (04 Oct 2023 18:00) (55 - 99)  ABP: --  ABP(mean): --  RR: 15 (04 Oct 2023 18:00) (0 - 29)  SpO2: 100% (04 Oct 2023 18:00) (97% - 100%)    O2 Parameters below as of 04 Oct 2023 07:00  Patient On (Oxygen Delivery Method): room air          GEN: NAD,   HEENT: normocephalic and atraumatic. EOMI. LAKISHA.    NECK: Supple. No carotid bruits.  No lymphadenopathy or thyromegaly.  LUNGS: Clear to auscultation.  HEART: Regular rate and rhythm without murmur.  ABDOMEN: Soft, nontender, and nondistended.  Positive bowel sounds.    : RIGHT FLANK TENDERNESS   EXTREMITIES: Without any cyanosis, clubbing, rash, lesions or edema.  MSK: no joint swelling  NEUROLOGIC: Cranial nerves II through XII are grossly intact.  PSYCHIATRIC: Appropriate affect .  SKIN: No ulceration or induration present.        Labs:  Vitals:  ============  T(F): 96.8 (04 Oct 2023 16:15), Max: 103.3 (04 Oct 2023 02:15)  HR: 65 (04 Oct 2023 18:00)  BP: 115/80 (04 Oct 2023 18:00)  RR: 15 (04 Oct 2023 18:00)  SpO2: 100% (04 Oct 2023 18:00) (97% - 100%)  temp max in last 48H T(F): , Max: 103.3 (10-04-23 @ 02:15)    =======================================================  Current Antibiotics:  piperacillin/tazobactam IVPB.. 3.375 Gram(s) IV Intermittent every 8 hours    Other medications:  chlorhexidine 2% Cloths 1 Application(s) Topical <User Schedule>  ferrous    sulfate 325 milliGRAM(s) Oral daily  influenza   Vaccine 0.5 milliLiter(s) IntraMuscular once  norepinephrine Infusion 0.05 MICROgram(s)/kG/Min IV Continuous <Continuous>  phenazopyridine 100 milliGRAM(s) Oral every 8 hours  prenatal multivitamin 1 Tablet(s) Oral daily  sodium chloride 0.9% with potassium chloride 20 mEq/L 1000 milliLiter(s) IV Continuous <Continuous>      =======================================================  Labs:                        9.2    9.54  )-----------( 203      ( 04 Oct 2023 03:57 )             26.4     10-04    130<L>  |  99  |  <3.0<L>  ----------------------------<  105<H>  3.1<L>   |  15.0<L>  |  0.30<L>    Ca    8.4      04 Oct 2023 03:57  Phos  2.0     10-04  Mg     1.4     10-04    TPro  6.8  /  Alb  3.7  /  TBili  0.6  /  DBili  x   /  AST  15  /  ALT  9   /  AlkPhos  56  10-02      Culture - Urine (collected 10-02-23 @ 22:25)  Source: Clean Catch Clean Catch (Midstream)    Culture - Blood (collected 10-02-23 @ 20:08)  Source: .Blood Blood-Peripheral  Gram Stain (10-03-23 @ 13:14):    Growth in aerobic bottle: Gram Negative Rods  Organism: Blood Culture PCR (10-03-23 @ 15:45)  Organism: Blood Culture PCR (10-03-23 @ 15:45)    Sensitivities:      Method Type: PCR      -  Escherichia coli: Detec    Culture - Blood (collected 10-02-23 @ 20:00)  Source: .Blood Blood-Peripheral      Creatinine: 0.30 mg/dL (10-04-23 @ 03:57)  Creatinine: 0.39 mg/dL (10-03-23 @ 17:45)  Creatinine: 0.41 mg/dL (10-03-23 @ 15:40)  Creatinine: 0.37 mg/dL (10-03-23 @ 07:14)  Creatinine: 0.38 mg/dL (10-02-23 @ 20:20)        Ferritin: 57 ng/mL (10-03-23 @ 07:14)      WBC Count: 9.54 K/uL (10-04-23 @ 03:57)  WBC Count: 7.38 K/uL (10-03-23 @ 17:45)  WBC Count: 7.97 K/uL (10-03-23 @ 07:14)  WBC Count: 8.70 K/uL (10-02-23 @ 20:20)    SARS-CoV-2: NotDetec (10-02-23 @ 20:50)      Alkaline Phosphatase: 56 U/L (10-02-23 @ 20:20)  Alanine Aminotransferase (ALT/SGPT): 9 U/L (10-02-23 @ 20:20)  Aspartate Aminotransferase (AST/SGOT): 15 U/L (10-02-23 @ 20:20)  Bilirubin Total: 0.6 mg/dL (10-02-23 @ 20:20)

## 2023-10-04 NOTE — CONSULT NOTE ADULT - NS ATTEND AMEND GEN_ALL_CORE FT
patient examined at bedside  discussed the findings and the case with ICU and radiology  the decision was made that she needs further imaging but will try for MRI first  MRI ordered and the patient informed    once the images are available will decide on need for any intervention

## 2023-10-04 NOTE — PROGRESS NOTE ADULT - PROBLEM SELECTOR PLAN 2
At Increased risk for  morbidity and mortality due to  delivery. Twin pregnancies are also at risk for intrauterine fetal growth restriction. There is also an increased risk for maternal anemia, gestational diabetes, urinary tract infections, and postpartum hemorrhage.

## 2023-10-04 NOTE — PROGRESS NOTE ADULT - PROBLEM SELECTOR PLAN 1
Back Pain - Blood cultures, 10/2, E. Coli   - UC, 10/2, E. Coli, sensitivities resulted   - Rocephin, DCd 10/3  - Placed on Zosyn 10/3  - RVP/COVID 19 PCR, negative   - UA, leukocyte, nitrite, bacteria, WBCs+   - Hypotensive, tachycardic  - placed on norepinephrine, currently 0.19 meq/kg/hr   - MFM consulted, following recs   - Urology consulted, pending recs   - Continue Zosyn  - Trend Fever  - Trend WBC

## 2023-10-04 NOTE — PROGRESS NOTE ADULT - SUBJECTIVE AND OBJECTIVE BOX
ASHISH YANES  [facility] 3EST [id number]  A 23year old G_P_  EDC _ at     *Insert Narrative      Vital Signs:  Vital Signs Last 24 Hrs  T(C): 37 (04 Oct 2023 08:15), Max: 39.6 (04 Oct 2023 02:15)  T(F): 98.6 (04 Oct 2023 08:15), Max: 103.3 (04 Oct 2023 02:15)  HR: 88 (04 Oct 2023 08:15) (71 - 130)  BP: 107/62 (04 Oct 2023 08:15) (74/48 - 134/62)  BP(mean): 75 (04 Oct 2023 08:15) (55 - 99)  RR: 12 (04 Oct 2023 08:15) (10 - 29)  SpO2: 100% (04 Oct 2023 08:15) (96% - 100%)    Parameters below as of 04 Oct 2023 07:00  Patient On (Oxygen Delivery Method): room air          Physical Exam:  General: Adult female in NAD  Head/Neck: No neck masses, no lymphadenopathy  CVS: RRR, +S1/S2, no murmurs  Lungs: CTAB, no wheezing, rhonchi or rales  Abdomen: soft, non-tender, gravid uterus  Pelvic: Deferred  Ext: No cyanosis, edema or calf tenderness  Skin: No rashes or lesions on exposed skin  Neuro: Normal DTRs, grossly intact    Labs:                          9.2    9.54  )-----------( 203      ( 04 Oct 2023 03:57 )             26.4     10-04    130<L>  |  99  |  <3.0<L>  ----------------------------<  105<H>  3.1<L>   |  15.0<L>  |  0.30<L>    Ca    8.4      04 Oct 2023 03:57  Phos  2.0     10-04  Mg     1.4     10-04    TPro  6.8  /  Alb  3.7  /  TBili  0.6  /  DBili  x   /  AST  15  /  ALT  9   /  AlkPhos  56  10-02    PT/INR - ( 02 Oct 2023 20:20 )   PT: 12.3 sec;   INR: 1.11 ratio         PTT - ( 03 Oct 2023 20:00 )  PTT:32.2 sec      Culture - Blood (collected 10-02-23 @ 20:08)  Source: .Blood Blood-Peripheral  Gram Stain (10-03-23 @ 13:14):    Growth in aerobic bottle: Gram Negative Rods  Preliminary Report (10-03-23 @ 13:14):    Growth in aerobic bottle: Gram Negative Rods    Direct identification is available within approximately 3-5    hours either by Blood Panel Multiplexed PCR or Direct    MALDI-TOF. Details: [email or web address]  Organism: Blood Culture PCR (10-03-23 @ 15:45)  Organism: Blood Culture PCR (10-03-23 @ 15:45)      Method Type: PCR      -  Escherichia coli: Detec    Culture - Blood (collected 10-02-23 @ 20:00)  Source: .Blood Blood-Peripheral  Preliminary Report (10-04-23 @ 02:02):    No growth at 24 hours        Radiology:    MEDICATIONS  (STANDING):  chlorhexidine 2% Cloths 1 Application(s) Topical <User Schedule>  ferrous    sulfate 325 milliGRAM(s) Oral daily  influenza   Vaccine 0.5 milliLiter(s) IntraMuscular once  lactated ringers Bolus 1000 milliLiter(s) IV Bolus once  lactated ringers. 1000 milliLiter(s) (150 mL/Hr) IV Continuous <Continuous>  magnesium sulfate  IVPB 4 Gram(s) IV Intermittent once  norepinephrine Infusion 0.05 MICROgram(s)/kG/Min (6.89 mL/Hr) IV Continuous <Continuous>  piperacillin/tazobactam IVPB.. 3.375 Gram(s) IV Intermittent every 8 hours  potassium chloride    Tablet ER 40 milliEquivalent(s) Oral every 4 hours  potassium chloride   Powder 40 milliEquivalent(s) Oral once  potassium phosphate IVPB 30 milliMole(s) IV Intermittent once  sodium chloride 0.9% with potassium chloride 20 mEq/L 1000 milliLiter(s) (75 mL/Hr) IV Continuous <Continuous>    MEDICATIONS  (PRN):  acetaminophen     Tablet .. 975 milliGRAM(s) Oral every 6 hours PRN Temp greater or equal to 38C (100.4F), Mild Pain (1 - 3)     ASHISH YANES  [facility] 3EST 3101 01  23y  at 17wks GA by first trimester sonogram. She is hospitalized for urosepsis. Currently on Zosyn.      Last 24 hours:   Pt febrile to 103.3, with tachycardia and hypotension. Pt admitted to MICU currently on pressor support with fluid resuscitation.        Reports flank pain  No other complaints at this time.      Vital Signs:  Vital Signs Last 24 Hrs  T(C): 37 (04 Oct 2023 08:15), Max: 39.6 (04 Oct 2023 02:15)  T(F): 98.6 (04 Oct 2023 08:15), Max: 103.3 (04 Oct 2023 02:15)  HR: 88 (04 Oct 2023 08:15) (71 - 130)  BP: 107/62 (04 Oct 2023 08:15) (74/48 - 134/62)  BP(mean): 75 (04 Oct 2023 08:15) (55 - 99)  RR: 12 (04 Oct 2023 08:15) (10 - 29)  SpO2: 100% (04 Oct 2023 08:15) (96% - 100%)    Parameters below as of 04 Oct 2023 07:00  Patient On (Oxygen Delivery Method): room air          Physical Exam:  General: Adult female in NAD  Head/Neck: No neck masses, no lymphadenopathy  CVS: RRR, +S1/S2, no murmurs  Lungs: CTAB, no wheezing, rhonchi or rales  Abdomen: soft, non-tender, gravid uterus  Pelvic: Deferred  Ext: No cyanosis, edema or calf tenderness  Skin: No rashes or lesions on exposed skin  Neuro: Normal DTRs, grossly intact    Labs:                          9.2    9.54  )-----------( 203      ( 04 Oct 2023 03:57 )             26.4     10-04    130<L>  |  99  |  <3.0<L>  ----------------------------<  105<H>  3.1<L>   |  15.0<L>  |  0.30<L>    Ca    8.4      04 Oct 2023 03:57  Phos  2.0     10-04  Mg     1.4     10-04    TPro  6.8  /  Alb  3.7  /  TBili  0.6  /  DBili  x   /  AST  15  /  ALT  9   /  AlkPhos  56  10-02    PT/INR - ( 02 Oct 2023 20:20 )   PT: 12.3 sec;   INR: 1.11 ratio         PTT - ( 03 Oct 2023 20:00 )  PTT:32.2 sec      Culture - Blood (collected 10-02-23 @ 20:08)  Source: .Blood Blood-Peripheral  Gram Stain (10-03-23 @ 13:14):    Growth in aerobic bottle: Gram Negative Rods  Preliminary Report (10-03-23 @ 13:14):    Growth in aerobic bottle: Gram Negative Rods    Direct identification is available within approximately 3-5    hours either by Blood Panel Multiplexed PCR or Direct    MALDI-TOF. Details: [email or web address]  Organism: Blood Culture PCR (10-03-23 @ 15:45)  Organism: Blood Culture PCR (10-03-23 @ 15:45)      Method Type: PCR      -  Escherichia coli: Detec    Culture - Blood (collected 10-02-23 @ 20:00)  Source: .Blood Blood-Peripheral  Preliminary Report (10-04-23 @ 02:02):    No growth at 24 hours        MEDICATIONS  (STANDING):  chlorhexidine 2% Cloths 1 Application(s) Topical <User Schedule>  ferrous    sulfate 325 milliGRAM(s) Oral daily  influenza   Vaccine 0.5 milliLiter(s) IntraMuscular once  lactated ringers Bolus 1000 milliLiter(s) IV Bolus once  lactated ringers. 1000 milliLiter(s) (150 mL/Hr) IV Continuous <Continuous>  magnesium sulfate  IVPB 4 Gram(s) IV Intermittent once  norepinephrine Infusion 0.05 MICROgram(s)/kG/Min (6.89 mL/Hr) IV Continuous <Continuous>  piperacillin/tazobactam IVPB.. 3.375 Gram(s) IV Intermittent every 8 hours  potassium chloride    Tablet ER 40 milliEquivalent(s) Oral every 4 hours  potassium chloride   Powder 40 milliEquivalent(s) Oral once  potassium phosphate IVPB 30 milliMole(s) IV Intermittent once  sodium chloride 0.9% with potassium chloride 20 mEq/L 1000 milliLiter(s) (75 mL/Hr) IV Continuous <Continuous>    MEDICATIONS  (PRN):  acetaminophen     Tablet .. 975 milliGRAM(s) Oral every 6 hours PRN Temp greater or equal to 38C (100.4F), Mild Pain (1 - 3)

## 2023-10-04 NOTE — PROGRESS NOTE ADULT - ASSESSMENT
23y  at 17wks GA by first trimester sonogram. She is hospitalized for urosepsis. Currently on Zosyn. Admitted to MICU overnight requiring pressor support.

## 2023-10-04 NOTE — CONSULT NOTE ADULT - SUBJECTIVE AND OBJECTIVE BOX
23y  at 16w6d GA by first trimester sono who presents to ED for . Patient denies vaginal bleeding, contractions and leakage of fluid. She endorses good fetal movement. Denies fevers, chills, nausea and vomiting. No other complaints at this time.   BOBO: 3/13/24  LMP: 23  Prenatal course is significant for:  1. Di/di twin gestation   2. UTI s/p Keflex     POB:  G1 14, full term, uncomplicated    G2 19, full term, uncomplicated    G3 SAB   PGYN: -fibroids, -ovarian cysts, denies STD hx, denies abnormal PAPs   PMH: Denies  PSH: Denies  SH: Denies EtOH, tobacco and illicit drug use during this pregnancy; feels safe at home   Meds: PNVs, aspirin   Allergies: NKDA    Vital Signs Last 24 Hrs  T(C): 39.1 (03 Oct 2023 03:19), Max: 39.1 (03 Oct 2023 03:19)  T(F): 102.4 (03 Oct 2023 03:19), Max: 102.4 (03 Oct 2023 03:19)  HR: 108 (03 Oct 2023 03:19) (108 - 126)  BP: 83/56 (03 Oct 2023 03:19) (72/48 - 97/53)  RR: 21 (03 Oct 2023 03:19) (18 - 21)  SpO2: 100% (03 Oct 2023 03:19) (99% - 100%)    Parameters below as of 03 Oct 2023 03:19  Patient On (Oxygen Delivery Method): room air    Gen: NAD, well-appearing   Abd: Soft, gravid, non-tender   Ext: non-tender, non-edematous  : No CVA tenderness, no uterine tenderness   SVE: Deferred   Bedside sono: AA vertex, FH 150s, fetal movement noted, BB breech, FH 170s, fetal movement noted    LABS:                        10.2   8.70  )-----------( 248      ( 02 Oct 2023 20:20 )             30.3   10-02    131<L>  |  96  |  4.2<L>  ----------------------------<  88  3.2<L>   |  18.0<L>  |  0.38<L>    Ca    9.2      02 Oct 2023 20:20    TPro  6.8  /  Alb  3.7  /  TBili  0.6  /  DBili  x   /  AST  15  /  ALT  9   /  AlkPhos  56  10-02  
REASON FOR CONSULT: Septic Shock    CONSULT REQUESTED BY: Dr. Warren    Patient is a 23y old  Female who presents with a chief complaint of abdominal pain, nausea, vomiting.     BRIEF HOSPITAL COURSE: Patient is a 22yo , no other medical history, who presented to the ED complaining of abdominal pain, nausea, vomiting, and fever on 10/2. Admitted to [facility] service for sepsis 2/2 UTI. Initially started on rocephin, broadened to Zosyn last night after patient spiked a fever to 102.4F at 3am. Today persistently tachy, hypotensive to 70s/40s. Is on maintenance fluids 125LR and got 1L bolus with no improvement. Patient complaining of flank pain, nausea, vomiting, headache, diffuse body aches. Denies dizziness, lightheadedness, diarrhea, chest pain, shortness of breath.     Recently came here from [facility] 6 months ago. Rest of family is still in [facility]. No pregnancy complications either in this pregnancy or previous pregnancies. Patient follows with [facility] because current pregnancy is Di-Di twins, high risk.     PAST MEDICAL & SURGICAL HISTORY:  No pertinent past medical history      Surgical Hx  D&C after 2nd son for sepsis 2/2 RPOC      Allergies    No Known Allergies    Intolerances      FAMILY HISTORY:  Grandmother: Diabetes    Review of Systems:  CONSTITUTIONAL: No fever, chills, or fatigue  EYES: No eye pain, visual disturbances, or discharge  ENMT:  No difficulty hearing, tinnitus, vertigo; No sinus or throat pain  NECK: No pain or stiffness  RESPIRATORY: No cough, wheezing, chills or hemoptysis; No shortness of breath  CARDIOVASCULAR: No chest pain, palpitations, dizziness, or leg swelling  GASTROINTESTINAL: No abdominal or epigastric pain. No nausea, vomiting, or hematemesis; No diarrhea or constipation. No melena or hematochezia.  GENITOURINARY: No dysuria, frequency, hematuria, or incontinence  NEUROLOGICAL: No headaches, memory loss, loss of strength, numbness, or tremors  SKIN: No itching, burning, rashes, or lesions   MUSCULOSKELETAL: No joint pain or swelling; No muscle, back, or extremity pain  PSYCHIATRIC: No depression, anxiety, mood swings, or difficulty sleeping      Medications:  piperacillin/tazobactam IVPB.. 3.375 Gram(s) IV Intermittent every 8 hours        acetaminophen     Tablet .. 975 milliGRAM(s) Oral every 6 hours PRN              ferrous    sulfate 325 milliGRAM(s) Oral daily    influenza   Vaccine 0.5 milliLiter(s) IntraMuscular once    chlorhexidine 2% Cloths 1 Application(s) Topical <User Schedule>            ICU Vital Signs Last 24 Hrs  T(C): 37.7 (03 Oct 2023 17:18), Max: 39.1 (03 Oct 2023 03:19)  T(F): 99.9 (03 Oct 2023 17:18), Max: 102.4 (03 Oct 2023 03:19)  HR: 122 (03 Oct 2023 17:18) (93 - 130)  BP: 78/48 (03 Oct 2023 17:18) (72/48 - 97/53)  BP(mean): 71 (03 Oct 2023 06:10) (71 - 71)  ABP: --  ABP(mean): --  RR: 18 (03 Oct 2023 17:18) (18 - 21)  SpO2: 96% (03 Oct 2023 17:18) (96% - 100%)    O2 Parameters below as of 03 Oct 2023 17:18  Patient On (Oxygen Delivery Method): room air          Vital Signs Last 24 Hrs  T(C): 37.7 (03 Oct 2023 17:18), Max: 39.1 (03 Oct 2023 03:19)  T(F): 99.9 (03 Oct 2023 17:18), Max: 102.4 (03 Oct 2023 03:19)  HR: 122 (03 Oct 2023 17:18) (93 - 130)  BP: 78/48 (03 Oct 2023 17:18) (72/48 - 97/53)  BP(mean): 71 (03 Oct 2023 06:10) (71 - 71)  RR: 18 (03 Oct 2023 17:18) (18 - 21)  SpO2: 96% (03 Oct 2023 17:18) (96% - 100%)    Parameters below as of 03 Oct 2023 17:18  Patient On (Oxygen Delivery Method): room air            I&O's Detail    03 Oct 2023 07:01  -  03 Oct 2023 18:16  --------------------------------------------------------  IN:    Lactated Ringers: 425 mL  Total IN: 425 mL    OUT:  Total OUT: 0 mL    Total NET: 425 mL            LABS:                        8.7    7.38  )-----------( 176      ( 03 Oct 2023 17:45 )             25.9     10-    131<L>  |  102  |  <3.0<L>  ----------------------------<  81  3.8   |  18.0<L>  |  0.41<L>    Ca    8.6      03 Oct 2023 15:40  Phos  3.7     10-  Mg     1.4     10-03    TPro  6.8  /  Alb  3.7  /  TBili  0.6  /  DBili  x   /  AST  15  /  ALT  9   /  AlkPhos  56  10          CAPILLARY BLOOD GLUCOSE      POCT Blood Glucose.: 77 mg/dL (03 Oct 2023 15:49)    PT/INR - ( 02 Oct 2023 20:20 )   PT: 12.3 sec;   INR: 1.11 ratio         PTT - ( 02 Oct 2023 20:20 )  PTT:32.4 sec  Urinalysis Basic - ( 03 Oct 2023 15:40 )    Color: x / Appearance: x / SG: x / pH: x  Gluc: 81 mg/dL / Ketone: x  / Bili: x / Urobili: x   Blood: x / Protein: x / Nitrite: x   Leuk Esterase: x / RBC: x / WBC x   Sq Epi: x / Non Sq Epi: x / Bacteria: x      CULTURES:  Culture Results:   Growth in aerobic bottle: Gram Negative Rods  Direct identification is available within approximately 3-5  hours either by Blood Panel Multiplexed PCR or Direct  MALDI-TOF. Details: [email or web address] (10-02 @ 20:08)      Physical Examination:    General: No acute distress.  Alert, oriented, interactive, nonfocal. Ill appearing, rigors.    HEENT: Pupils equal, reactive to light.  Symmetric.    PULM: Clear to auscultation bilaterally, no significant sputum production    CVS: Regular rate and rhythm, no murmurs, rubs, or gallops    ABD: Soft, nondistended, ttp lower abdomen, normoactive bowel sounds, no masses    EXT: No edema, nontender    SKIN: Warm and well perfused, no rashes noted.
INFECTIOUS DISEASES AND INTERNAL MEDICINE at [place]  =======================================================  Arnav Morales MD  Diplomates [employer] Board of Internal Medicine and Infectious Diseases  Telephone [phone]  Fax            [phone]  =======================================================    ASHISH [id number]      HPI:  23y  at 16w6d GA by first trimester sono who presents to ED for . Patient denies vaginal bleeding, contractions and leakage of fluid. She endorses good fetal movement. Denies fevers, chills, nausea and vomiting. No other complaints at this time.   BOBO: 3/13/24  LMP: 23  Prenatal course is significant for:  1. Di/di twin gestation   2. UTI s/p Keflex    AS ABOVE 16WEEK PREGNANT WITH FEVERS CHILLS  BLOOD CX ECOLI  BP IS LOW   ASKED TO EVALUATE FROM ID STANDPOINT                (03 Oct 2023 00:46)      PAST MEDICAL & SURGICAL HISTORY:  No pertinent past medical history      No significant past surgical history          ANTIBIOTICS  piperacillin/tazobactam IVPB.. 3.375 Gram(s) IV Intermittent every 8 hours      Allergies    No Known Allergies    Intolerances        SOCIAL HISTORY:     FAMILY HX   FAMILY HISTORY:      Vital Signs Last 24 Hrs  T(C): 37.7 (03 Oct 2023 17:18), Max: 39.1 (03 Oct 2023 03:19)  T(F): 99.9 (03 Oct 2023 17:18), Max: 102.4 (03 Oct 2023 03:19)  HR: 122 (03 Oct 2023 17:18) (93 - 130)  BP: 78/48 (03 Oct 2023 17:18) (72/48 - 97/53)  BP(mean): 71 (03 Oct 2023 06:10) (71 - 71)  RR: 18 (03 Oct 2023 17:18) (18 - 21)  SpO2: 96% (03 Oct 2023 17:18) (96% - 100%)    Parameters below as of 03 Oct 2023 17:18  Patient On (Oxygen Delivery Method): room air      Drug Dosing Weight  Height (cm): 170.2 (02 Oct 2023 18:22)  Weight (kg): 73.5 (02 Oct 2023 18:22)  BMI (kg/m2): 25.4 (02 Oct 2023 18:22)  BSA (m2): 1.85 (02 Oct 2023 18:22)      REVIEW OF SYSTEMS:    CONSTITUTIONAL:  As per HPI.    HEENT:  Eyes:  No diplopia or blurred vision. ENT:  No earache, sore throat or runny nose.    CARDIOVASCULAR:  No pressure, squeezing, strangling, tightness, heaviness or aching about the chest, neck, axilla or epigastrium.    RESPIRATORY:  No cough, shortness of breath, PND or orthopnea.    GASTROINTESTINAL:  No nausea, vomiting or diarrhea.    GENITOURINARY:  LEFT FLANK PAIN    MUSCULOSKELETAL:  As per HPI.    SKIN:  No change in skin, hair or nails.    NEUROLOGIC:  No paresthesias, fasciculations, seizures or weakness.                  PHYSICAL EXAMINATION:    GENERAL: The patient is a _____in no apparent distress. ___     VITAL SIGNS: T(C): 37.7 (10-03-23 @ 17:18), Max: 39.1 (10-03-23 @ 03:19)  HR: 122 (10-03-23 @ 17:18) (93 - 130)  BP: 78/48 (10-03-23 @ 17:18) (72/48 - 97/53)  RR: 18 (10-03-23 @ 17:18) (18 - 21)  SpO2: 96% (10-03-23 @ 17:18) (96% - 100%)  Wt(kg): --    HEENT: Head is normocephalic and atraumatic.  ANICTERIC  NECK: Supple. No carotid bruits.  No lymphadenopathy or thyromegaly.    LUNGS:COARSE BREATH SOUNDS    HEART: Regular rate and rhythm without murmur.    ABDOMEN: Soft, nontender, and nondistended.  Positive bowel sounds.  No hepatosplenomegaly was noted. NO REBOUND NO GUARDING  LEFT FLANK PAIN    EXTREMITIES: NO EDEMA NO ERYTHEMA    NEUROLOGIC: NON FOCAL      SKIN: No ulceration or induration present. NO RASH        BLOOD CULTURES  Culture Results:   Growth in aerobic bottle: Gram Negative Rods  Direct identification is available within approximately 3-5  hours either by Blood Panel Multiplexed PCR or Direct  MALDI-TOF. Details: [email or web address] (10-02 @ 20:08)       URINE CX          LABS:                        8.5    7.97  )-----------( 205      ( 03 Oct 2023 07:14 )             26.0     10-    131<L>  |  102  |  <3.0<L>  ----------------------------<  81  3.8   |  18.0<L>  |  0.41<L>    Ca    8.6      03 Oct 2023 15:40  Phos  3.7     10-  Mg     1.4     10-    TPro  6.8  /  Alb  3.7  /  TBili  0.6  /  DBili  x   /  AST  15  /  ALT  9   /  AlkPhos  56  10-02    PT/INR - ( 02 Oct 2023 20:20 )   PT: 12.3 sec;   INR: 1.11 ratio         PTT - ( 02 Oct 2023 20:20 )  PTT:32.4 sec  Urinalysis Basic - ( 03 Oct 2023 15:40 )    Color: x / Appearance: x / SG: x / pH: x  Gluc: 81 mg/dL / Ketone: x  / Bili: x / Urobili: x   Blood: x / Protein: x / Nitrite: x   Leuk Esterase: x / RBC: x / WBC x   Sq Epi: x / Non Sq Epi: x / Bacteria: x        RADIOLOGY & ADDITIONAL STUDIES:      ASSESSMENT/PLAN    23y  at 16w6d GA by first trimester sono who presents to ED for . Patient denies vaginal bleeding, contractions and leakage of fluid. She endorses good fetal movement. Denies fevers, chills, nausea and vomiting. No other complaints at this time. PT [employer] IN [place] ABOUT 6  MONTHS   BOBO: 3/13/24  LMP: 23  Prenatal course is significant for:  1. Di/di twin gestation   2. UTI s/p Keflex    AS ABOVE 16WEEK PREGNANT WITH FEVERS CHILLS  BLOOD CX ECOLI  BP IS LOW  PT IS ILL Appearing  WITH LEFT FALNK PAIN CONCERN FOR PYELONEPHRITIS   IV FLUIDS   CAN CONITNUE IV ZOSYN  SUGGEST ICU Evaluation  NEEDS RENAL IMAGING  SUGGEST RENAL SONOGRAM   IF UNABLE TO DO CT SCAN                   LEONIDES OLGUIN MD
HPI/ICU:Patient is a 22yo , no other medical history, who presented to the ED complaining of abdominal pain, nausea, vomiting, and fever on 10/2. Admitted to [facility] service for sepsis 2/2 UTI. Initially started on rocephin, broadened to Zosyn last night after patient spiked a fever to 102.4F at 3am. Today persistently tachy, hypotensive to 70s/40s. Is on maintenance fluids 125LR and got 1L bolus with no improvement. Patient complaining of flank pain, nausea, vomiting, headache, diffuse body aches. Denies dizziness, lightheadedness, diarrhea, chest pain, shortness of breath.     Recently came here from [facility] 6 months ago. Rest of family is still in [facility]. No pregnancy complications either in this pregnancy or previous pregnancies. Patient follows with [facility] because current pregnancy is Di-Di twins, high risk.   23y  at 16w6d GA by first trimester sono who presents to ED for . Patient denies vaginal bleeding, contractions and leakage of fluid. She endorses good fetal movement. Denies fevers, chills, nausea and vomiting. No other complaints at this time.   BOBO: 3/13/24  LMP: 23  Prenatal course is significant for:  1. Di/di twin gestation   2. UTI s/p Keflex     POB:  G1 14, full term, uncomplicated    G2 19, full term, uncomplicated    G3 SAB   PGYN: -fibroids, -ovarian cysts, denies STD hx, denies abnormal PAPs   PMH: Denies  PSH: Denies  SH: Denies EtOH, tobacco and illicit drug use during this pregnancy; feels safe at home   Meds: PNVs, aspirin   Allergies: NKDA    Urology: called to see pt for b/l pyelonephritis, hydronephrosis and worsening of pt's condition.  pt presented to ED 2 days ago with fever, chills and lower abdominal pain.  Pt diagnosed with UTI, admitted to OB/gyn service.  Pt spiked fevers, became hypotensive and is now in the MICU on pressors.  pt appears ill and very uncomfortably.  Pt states she has lower abdominal pain, denies any flank pain prior to admission.  Pt has no h/o renal calculi.      (03 Oct 2023 00:46)      PAST MEDICAL & SURGICAL HISTORY:  No pertinent past medical history      No significant past surgical history          acetaminophen     Tablet .. 975 milliGRAM(s) Oral every 6 hours PRN  chlorhexidine 2% Cloths 1 Application(s) Topical <User Schedule>  ferrous    sulfate 325 milliGRAM(s) Oral daily  HYDROmorphone   Tablet 2 milliGRAM(s) Oral every 6 hours PRN  influenza   Vaccine 0.5 milliLiter(s) IntraMuscular once  norepinephrine Infusion 0.05 MICROgram(s)/kG/Min IV Continuous <Continuous>  phenazopyridine 100 milliGRAM(s) Oral every 8 hours  piperacillin/tazobactam IVPB.. 3.375 Gram(s) IV Intermittent every 8 hours  prenatal multivitamin 1 Tablet(s) Oral daily  sodium chloride 0.9% with potassium chloride 20 mEq/L 1000 milliLiter(s) IV Continuous <Continuous>      No Known Allergies      T(C): 37.1 (10-04-23 @ 13:00), Max: 39.6 (10-04-23 @ 02:15)  HR: 77 (10-04-23 @ 13:00) (71 - 122)  BP: 100/51 (10-04-23 @ 13:00) (74/48 - 134/62)  RR: 16 (10-04-23 @ 13:00) (10 - 29)  SpO2: 100% (10-04-23 @ 13:00) (96% - 100%)      10-03-23 @ 07:01  -  10-04-23 @ 07:00  --------------------------------------------------------  IN: 6666.8 mL / OUT: 0 mL / NET: 6666.8 mL    10-04-23 @ 07:01  -  10-04-23 @ 13:55  --------------------------------------------------------  IN: 1402.1 mL / OUT: 552 mL / NET: 850.1 mL                              9.2    9.54  )-----------( 203      ( 04 Oct 2023 03:57 )             26.4       10-04    130<L>  |  99  |  <3.0<L>  ----------------------------<  105<H>  3.1<L>   |  15.0<L>  |  0.30<L>    Ca    8.4      04 Oct 2023 03:57  Phos  2.0     10-  Mg     1.4     10    TPro  6.8  /  Alb  3.7  /  TBili  0.6  /  DBili  x   /  AST  15  /  ALT  9   /  AlkPhos  56  10-      Urinalysis Basic - ( 04 Oct 2023 03:57 )    Color: x / Appearance: x / SG: x / pH: x  Gluc: 105 mg/dL / Ketone: x  / Bili: x / Urobili: x   Blood: x / Protein: x / Nitrite: x   Leuk Esterase: x / RBC: x / WBC x   Sq Epi: x / Non Sq Epi: x / Bacteria: x    Radiology:< from: US Renal (10.03.23 @ 22:25) >  ACC: [id number] EXAM:  US KIDNEY(S)   ORDERED BY: DARSHANA MAYER DATE:  10/03/2023          INTERPRETATION:  CLINICAL INFORMATION: Urosepsis.    COMPARISON: None available.    TECHNIQUE: Sonography of the kidneys and bladder.    FINDINGS:  Right kidney: 12.3 cm. Mild hydronephrosis. No renal mass or calculi.    Left kidney: 12.4 cm. Mild hydronephrosis. No renal mass or calculi.    Urinary bladder: Prevoid bladder volume approximates 156 cc's. Post void   bladder volume approximates 18 cc. No diffuse bladder wall thickening. No   intraluminal mass. Bilateral ureteral jets visualized.    IMPRESSION:  Mild bilateral hydronephrosis, persisting following voiding.    < end of copied text >

## 2023-10-05 LAB
-  AMIKACIN: SIGNIFICANT CHANGE UP
-  AMIKACIN: SIGNIFICANT CHANGE UP
-  AMOXICILLIN/CLAVULANIC ACID: SIGNIFICANT CHANGE UP
-  AMPICILLIN/SULBACTAM: SIGNIFICANT CHANGE UP
-  AMPICILLIN/SULBACTAM: SIGNIFICANT CHANGE UP
-  AMPICILLIN: SIGNIFICANT CHANGE UP
-  AMPICILLIN: SIGNIFICANT CHANGE UP
-  AZTREONAM: SIGNIFICANT CHANGE UP
-  AZTREONAM: SIGNIFICANT CHANGE UP
-  CEFAZOLIN: SIGNIFICANT CHANGE UP
-  CEFAZOLIN: SIGNIFICANT CHANGE UP
-  CEFEPIME: SIGNIFICANT CHANGE UP
-  CEFEPIME: SIGNIFICANT CHANGE UP
-  CEFOXITIN: SIGNIFICANT CHANGE UP
-  CEFOXITIN: SIGNIFICANT CHANGE UP
-  CEFTRIAXONE: SIGNIFICANT CHANGE UP
-  CEFTRIAXONE: SIGNIFICANT CHANGE UP
-  CEFUROXIME: SIGNIFICANT CHANGE UP
-  CIPROFLOXACIN: SIGNIFICANT CHANGE UP
-  CIPROFLOXACIN: SIGNIFICANT CHANGE UP
-  ERTAPENEM: SIGNIFICANT CHANGE UP
-  ERTAPENEM: SIGNIFICANT CHANGE UP
-  GENTAMICIN: SIGNIFICANT CHANGE UP
-  GENTAMICIN: SIGNIFICANT CHANGE UP
-  IMIPENEM: SIGNIFICANT CHANGE UP
-  IMIPENEM: SIGNIFICANT CHANGE UP
-  LEVOFLOXACIN: SIGNIFICANT CHANGE UP
-  LEVOFLOXACIN: SIGNIFICANT CHANGE UP
-  MEROPENEM: SIGNIFICANT CHANGE UP
-  MEROPENEM: SIGNIFICANT CHANGE UP
-  NITROFURANTOIN: SIGNIFICANT CHANGE UP
-  PIPERACILLIN/TAZOBACTAM: SIGNIFICANT CHANGE UP
-  PIPERACILLIN/TAZOBACTAM: SIGNIFICANT CHANGE UP
-  TOBRAMYCIN: SIGNIFICANT CHANGE UP
-  TOBRAMYCIN: SIGNIFICANT CHANGE UP
-  TRIMETHOPRIM/SULFAMETHOXAZOLE: SIGNIFICANT CHANGE UP
-  TRIMETHOPRIM/SULFAMETHOXAZOLE: SIGNIFICANT CHANGE UP
ALBUMIN SERPL ELPH-MCNC: 3 G/DL — LOW (ref 3.3–5.2)
ALP SERPL-CCNC: 55 U/L — SIGNIFICANT CHANGE UP (ref 40–120)
ALT FLD-CCNC: 22 U/L — SIGNIFICANT CHANGE UP
ANION GAP SERPL CALC-SCNC: 13 MMOL/L — SIGNIFICANT CHANGE UP (ref 5–17)
ANION GAP SERPL CALC-SCNC: 14 MMOL/L — SIGNIFICANT CHANGE UP (ref 5–17)
AST SERPL-CCNC: 40 U/L — HIGH
BILIRUB SERPL-MCNC: 0.5 MG/DL — SIGNIFICANT CHANGE UP (ref 0.4–2)
BUN SERPL-MCNC: <3 MG/DL — LOW (ref 8–20)
BUN SERPL-MCNC: <3 MG/DL — LOW (ref 8–20)
CALCIUM SERPL-MCNC: 8.5 MG/DL — SIGNIFICANT CHANGE UP (ref 8.4–10.5)
CALCIUM SERPL-MCNC: 8.7 MG/DL — SIGNIFICANT CHANGE UP (ref 8.4–10.5)
CHLORIDE SERPL-SCNC: 100 MMOL/L — SIGNIFICANT CHANGE UP (ref 96–108)
CHLORIDE SERPL-SCNC: 101 MMOL/L — SIGNIFICANT CHANGE UP (ref 96–108)
CO2 SERPL-SCNC: 17 MMOL/L — LOW (ref 22–29)
CO2 SERPL-SCNC: 20 MMOL/L — LOW (ref 22–29)
CREAT SERPL-MCNC: 0.3 MG/DL — LOW (ref 0.5–1.3)
CREAT SERPL-MCNC: 0.33 MG/DL — LOW (ref 0.5–1.3)
CULTURE RESULTS: SIGNIFICANT CHANGE UP
CULTURE RESULTS: SIGNIFICANT CHANGE UP
EGFR: 147 ML/MIN/1.73M2 — SIGNIFICANT CHANGE UP
EGFR: 151 ML/MIN/1.73M2 — SIGNIFICANT CHANGE UP
GLUCOSE SERPL-MCNC: 85 MG/DL — SIGNIFICANT CHANGE UP (ref 70–99)
GLUCOSE SERPL-MCNC: 93 MG/DL — SIGNIFICANT CHANGE UP (ref 70–99)
HCT VFR BLD CALC: 25.9 % — LOW (ref 34.5–45)
HCT VFR BLD CALC: 27.9 % — LOW (ref 34.5–45)
HGB BLD-MCNC: 8.6 G/DL — LOW (ref 11.5–15.5)
HGB BLD-MCNC: 9.5 G/DL — LOW (ref 11.5–15.5)
LACTATE SERPL-SCNC: 0.7 MMOL/L — SIGNIFICANT CHANGE UP (ref 0.5–2)
MAGNESIUM SERPL-MCNC: 1.7 MG/DL — SIGNIFICANT CHANGE UP (ref 1.6–2.6)
MAGNESIUM SERPL-MCNC: 1.9 MG/DL — SIGNIFICANT CHANGE UP (ref 1.8–2.6)
MCHC RBC-ENTMCNC: 29.4 PG — SIGNIFICANT CHANGE UP (ref 27–34)
MCHC RBC-ENTMCNC: 30 PG — SIGNIFICANT CHANGE UP (ref 27–34)
MCHC RBC-ENTMCNC: 33.2 GM/DL — SIGNIFICANT CHANGE UP (ref 32–36)
MCHC RBC-ENTMCNC: 34.1 GM/DL — SIGNIFICANT CHANGE UP (ref 32–36)
MCV RBC AUTO: 88 FL — SIGNIFICANT CHANGE UP (ref 80–100)
MCV RBC AUTO: 88.4 FL — SIGNIFICANT CHANGE UP (ref 80–100)
METHOD TYPE: SIGNIFICANT CHANGE UP
METHOD TYPE: SIGNIFICANT CHANGE UP
ORGANISM # SPEC MICROSCOPIC CNT: SIGNIFICANT CHANGE UP
PHOSPHATE SERPL-MCNC: 3.2 MG/DL — SIGNIFICANT CHANGE UP (ref 2.4–4.7)
PHOSPHATE SERPL-MCNC: 3.3 MG/DL — SIGNIFICANT CHANGE UP (ref 2.4–4.7)
PLATELET # BLD AUTO: 207 K/UL — SIGNIFICANT CHANGE UP (ref 150–400)
PLATELET # BLD AUTO: 219 K/UL — SIGNIFICANT CHANGE UP (ref 150–400)
POTASSIUM SERPL-MCNC: 3.6 MMOL/L — SIGNIFICANT CHANGE UP (ref 3.5–5.3)
POTASSIUM SERPL-MCNC: 4 MMOL/L — SIGNIFICANT CHANGE UP (ref 3.5–5.3)
POTASSIUM SERPL-SCNC: 3.6 MMOL/L — SIGNIFICANT CHANGE UP (ref 3.5–5.3)
POTASSIUM SERPL-SCNC: 4 MMOL/L — SIGNIFICANT CHANGE UP (ref 3.5–5.3)
PROT SERPL-MCNC: 5.9 G/DL — LOW (ref 6.6–8.7)
RBC # BLD: 2.93 M/UL — LOW (ref 3.8–5.2)
RBC # BLD: 3.17 M/UL — LOW (ref 3.8–5.2)
RBC # FLD: 13.5 % — SIGNIFICANT CHANGE UP (ref 10.3–14.5)
RBC # FLD: 13.6 % — SIGNIFICANT CHANGE UP (ref 10.3–14.5)
SODIUM SERPL-SCNC: 132 MMOL/L — LOW (ref 135–145)
SODIUM SERPL-SCNC: 133 MMOL/L — LOW (ref 135–145)
SPECIMEN SOURCE: SIGNIFICANT CHANGE UP
SPECIMEN SOURCE: SIGNIFICANT CHANGE UP
WBC # BLD: 5.26 K/UL — SIGNIFICANT CHANGE UP (ref 3.8–10.5)
WBC # BLD: 8.18 K/UL — SIGNIFICANT CHANGE UP (ref 3.8–10.5)
WBC # FLD AUTO: 5.26 K/UL — SIGNIFICANT CHANGE UP (ref 3.8–10.5)
WBC # FLD AUTO: 8.18 K/UL — SIGNIFICANT CHANGE UP (ref 3.8–10.5)

## 2023-10-05 PROCEDURE — 99232 SBSQ HOSP IP/OBS MODERATE 35: CPT

## 2023-10-05 PROCEDURE — 99233 SBSQ HOSP IP/OBS HIGH 50: CPT

## 2023-10-05 PROCEDURE — 99233 SBSQ HOSP IP/OBS HIGH 50: CPT | Mod: GC

## 2023-10-05 PROCEDURE — 99232 SBSQ HOSP IP/OBS MODERATE 35: CPT | Mod: GC

## 2023-10-05 RX ORDER — NOREPINEPHRINE BITARTRATE/D5W 8 MG/250ML
0.05 PLASTIC BAG, INJECTION (ML) INTRAVENOUS
Qty: 8 | Refills: 0 | Status: DISCONTINUED | OUTPATIENT
Start: 2023-10-05 | End: 2023-10-06

## 2023-10-05 RX ORDER — SODIUM CHLORIDE 9 MG/ML
1000 INJECTION, SOLUTION INTRAVENOUS ONCE
Refills: 0 | Status: COMPLETED | OUTPATIENT
Start: 2023-10-05 | End: 2023-10-05

## 2023-10-05 RX ORDER — SODIUM BICARBONATE 1 MEQ/ML
100 SYRINGE (ML) INTRAVENOUS ONCE
Refills: 0 | Status: COMPLETED | OUTPATIENT
Start: 2023-10-05 | End: 2023-10-05

## 2023-10-05 RX ORDER — SENNA PLUS 8.6 MG/1
2 TABLET ORAL AT BEDTIME
Refills: 0 | Status: ACTIVE | OUTPATIENT
Start: 2023-10-05 | End: 2024-09-02

## 2023-10-05 RX ORDER — POTASSIUM CHLORIDE 20 MEQ
40 PACKET (EA) ORAL ONCE
Refills: 0 | Status: COMPLETED | OUTPATIENT
Start: 2023-10-05 | End: 2023-10-05

## 2023-10-05 RX ADMIN — Medication 100 MILLIGRAM(S): at 13:58

## 2023-10-05 RX ADMIN — Medication 40 MILLIEQUIVALENT(S): at 08:06

## 2023-10-05 RX ADMIN — PIPERACILLIN AND TAZOBACTAM 25 GRAM(S): 4; .5 INJECTION, POWDER, LYOPHILIZED, FOR SOLUTION INTRAVENOUS at 13:59

## 2023-10-05 RX ADMIN — PIPERACILLIN AND TAZOBACTAM 25 GRAM(S): 4; .5 INJECTION, POWDER, LYOPHILIZED, FOR SOLUTION INTRAVENOUS at 21:08

## 2023-10-05 RX ADMIN — CHLORHEXIDINE GLUCONATE 1 APPLICATION(S): 213 SOLUTION TOPICAL at 06:29

## 2023-10-05 RX ADMIN — Medication 100 MILLIGRAM(S): at 06:27

## 2023-10-05 RX ADMIN — Medication 6.89 MICROGRAM(S)/KG/MIN: at 22:09

## 2023-10-05 RX ADMIN — Medication 1 TABLET(S): at 11:22

## 2023-10-05 RX ADMIN — Medication 100 MILLIGRAM(S): at 21:08

## 2023-10-05 RX ADMIN — Medication 975 MILLIGRAM(S): at 00:04

## 2023-10-05 RX ADMIN — Medication 100 MILLIEQUIVALENT(S): at 10:20

## 2023-10-05 RX ADMIN — PIPERACILLIN AND TAZOBACTAM 25 GRAM(S): 4; .5 INJECTION, POWDER, LYOPHILIZED, FOR SOLUTION INTRAVENOUS at 06:29

## 2023-10-05 RX ADMIN — SODIUM CHLORIDE 1000 MILLILITER(S): 9 INJECTION, SOLUTION INTRAVENOUS at 20:07

## 2023-10-05 RX ADMIN — Medication 325 MILLIGRAM(S): at 11:22

## 2023-10-05 NOTE — DIETITIAN INITIAL EVALUATION ADULT - PERTINENT MEDS FT
MEDICATIONS  (STANDING):  chlorhexidine 2% Cloths 1 Application(s) Topical <User Schedule>  ferrous    sulfate 325 milliGRAM(s) Oral daily  influenza   Vaccine 0.5 milliLiter(s) IntraMuscular once  norepinephrine Infusion 0.05 MICROgram(s)/kG/Min (6.89 mL/Hr) IV Continuous <Continuous>  phenazopyridine 100 milliGRAM(s) Oral every 8 hours  piperacillin/tazobactam IVPB.. 3.375 Gram(s) IV Intermittent every 8 hours  prenatal multivitamin 1 Tablet(s) Oral daily  senna 2 Tablet(s) Oral at bedtime    MEDICATIONS  (PRN):  acetaminophen     Tablet .. 975 milliGRAM(s) Oral every 6 hours PRN Temp greater or equal to 38C (100.4F), Mild Pain (1 - 3)

## 2023-10-05 NOTE — PROGRESS NOTE ADULT - SUBJECTIVE AND OBJECTIVE BOX
24 y/o F with no significant PMH, 16 weeks pregnant (twins), admitted on 10/3 with e coli urosepsis, e coli bacteremia, and septic shock. Weaned off IV vasopressor in the afternoon on 10/5.    Called to bedside by RN for recurrent hypotension (SBP 70s/80s).     - 1 L LR bolus x 1 without improvement in BP  - restart norepinephrine infusion, titrate to maintain a MAP > 60  - start midodrine 10mg TID to help offload infusion requirement  - monitor clinical and laboratory end-points of perfusion closely    E coli sensitivities have resulted. Will narrow antibiotic to ceftriaxone.    Replete potassium and magnesium.    Case discussed with MICU physician, Dr. Webber.      CRITICAL CARE TIME SPENT: 32 mins  Time spent evaluating/treating patient with medical issues that pose a high risk for life threatening deterioration and/or end-organ damage, reviewing data/labs/imaging, discussing case with multidisciplinary team, discussing plan/goals of care with patient/family. Non-inclusive of procedure time.       24 y/o F with no significant PMH, 16 weeks pregnant (twins), admitted on 10/3 with e coli urosepsis, e coli bacteremia, and septic shock. 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 Weaned off IV vasopressor in the afternoon on 10/5.    Called to bedside by RN for recurrent hypotension (SBP 70s/80s).     - 1 L LR bolus x 1 without improvement in BP  - restart norepinephrine infusion, titrate to maintain a MAP > 60  - start midodrine 10mg TID to help offload infusion requirement  - monitor clinical and laboratory end-points of perfusion closely    E coli sensitivities have resulted. Will narrow antibiotic to ceftriaxone.    Replete potassium and magnesium.    MFM input appreciated.      Case discussed with MICU physician, Dr. Webber.      CRITICAL CARE TIME SPENT: 32 mins  Time spent evaluating/treating patient with medical issues that pose a high risk for life threatening deterioration and/or end-organ damage, reviewing data/labs/imaging, discussing case with multidisciplinary team, discussing plan/goals of care with patient/family. Non-inclusive of procedure time.

## 2023-10-05 NOTE — PROGRESS NOTE ADULT - SUBJECTIVE AND OBJECTIVE BOX
INFECTIOUS DISEASES AND INTERNAL MEDICINE at [place]  =======================================================  Arnav Morales MD  Diplomates American Board of Internal Medicine and Infectious Diseases  Telephone [phone]  Fax            [phone]  =======================================================    ASHISH YANES [id number]    Follow up: [facility]    Allergies:  No Known Allergies      Medications:  acetaminophen     Tablet .. 975 milliGRAM(s) Oral every 6 hours PRN  chlorhexidine 2% Cloths 1 Application(s) Topical <User Schedule>  ferrous    sulfate 325 milliGRAM(s) Oral daily  influenza   Vaccine 0.5 milliLiter(s) IntraMuscular once  norepinephrine Infusion 0.05 MICROgram(s)/kG/Min IV Continuous <Continuous>  phenazopyridine 100 milliGRAM(s) Oral every 8 hours  piperacillin/tazobactam IVPB.. 3.375 Gram(s) IV Intermittent every 8 hours  prenatal multivitamin 1 Tablet(s) Oral daily  senna 2 Tablet(s) Oral at bedtime    SOCIAL       FAMILY   FAMILY HISTORY:    REVIEW OF SYSTEMS:  CONSTITUTIONAL:  No Fever or chills  HEENT:   No diplopia or blurred vision.  No earache, sore throat or runny nose.  CARDIOVASCULAR:  No pressure, squeezing, strangling, tightness, heaviness or aching about the chest, neck, axilla or epigastrium.  RESPIRATORY:  No cough, shortness of breath, PND or orthopnea.  GASTROINTESTINAL:  No nausea, vomiting or diarrhea.  GENITOURINARY:  No dysuria, frequency or urgency. No Blood in urine  MUSCULOSKELETAL:   moves all joints  SKIN:  No change in skin, hair or nails.  NEUROLOGIC:  No paresthesias, fasciculations, seizures or weakness.  PSYCHIATRIC:  No disorder of thought or mood.  ENDOCRINE:  No heat or cold intolerance, polyuria or polydipsia.  HEMATOLOGICAL:  No easy bruising or bleeding.            Physical Exam:  ICU Vital Signs Last 24 Hrs  T(C): 36.7 (05 Oct 2023 15:48), Max: 38.3 (04 Oct 2023 23:11)  T(F): 98.1 (05 Oct 2023 15:48), Max: 100.9 (04 Oct 2023 23:11)  HR: 122 (05 Oct 2023 17:30) (65 - 125)  BP: 100/65 (05 Oct 2023 17:30) (57/31 - 121/89)  BP(mean): 77 (05 Oct 2023 17:30) (39 - 100)  ABP: --  ABP(mean): --  RR: 16 (05 Oct 2023 17:30) (10 - 29)  SpO2: 100% (05 Oct 2023 17:30) (89% - 100%)    O2 Parameters below as of 05 Oct 2023 16:00  Patient On (Oxygen Delivery Method): room air          GEN: NAD,   HEENT: normocephalic and atraumatic. EOMI. LAKISHA.    NECK: Supple. No carotid bruits.  No lymphadenopathy or thyromegaly.  LUNGS: Clear to auscultation.  HEART: Regular rate and rhythm without murmur.  ABDOMEN: Soft, nontender, and nondistended.  Positive bowel sounds.    : No CVA tenderness  EXTREMITIES: Without any cyanosis, clubbing, rash, lesions or edema.  MSK: no joint swelling  NEUROLOGIC: Cranial nerves II through XII are grossly intact.  PSYCHIATRIC: Appropriate affect .  SKIN: No ulceration or induration present.        Labs:  Vitals:  ============  T(F): 98.1 (05 Oct 2023 15:48), Max: 100.9 (04 Oct 2023 23:11)  HR: 122 (05 Oct 2023 17:30)  BP: 100/65 (05 Oct 2023 17:30)  RR: 16 (05 Oct 2023 17:30)  SpO2: 100% (05 Oct 2023 17:30) (89% - 100%)  temp max in last 48H T(F): , Max: 103.3 (10-04-23 @ 02:15)    =======================================================  Current Antibiotics:  piperacillin/tazobactam IVPB.. 3.375 Gram(s) IV Intermittent every 8 hours    Other medications:  chlorhexidine 2% Cloths 1 Application(s) Topical <User Schedule>  ferrous    sulfate 325 milliGRAM(s) Oral daily  influenza   Vaccine 0.5 milliLiter(s) IntraMuscular once  norepinephrine Infusion 0.05 MICROgram(s)/kG/Min IV Continuous <Continuous>  phenazopyridine 100 milliGRAM(s) Oral every 8 hours  prenatal multivitamin 1 Tablet(s) Oral daily  senna 2 Tablet(s) Oral at bedtime      =======================================================  Labs:                        8.6    5.26  )-----------( 219      ( 05 Oct 2023 16:20 )             25.9     10-05    133<L>  |  100  |  <3.0<L>  ----------------------------<  85  4.0   |  20.0<L>  |  0.30<L>    Ca    8.5      05 Oct 2023 16:20  Phos  3.3     10-05  Mg     1.7     10-05    TPro  5.9<L>  /  Alb  3.0<L>  /  TBili  0.5  /  DBili  x   /  AST  40<H>  /  ALT  22  /  AlkPhos  55  10-05      Culture - Urine (collected 10-02-23 @ 22:25)  Source: Clean Catch Clean Catch (Midstream)    Culture - Blood (collected 10-02-23 @ 20:08)  Source: .Blood Blood-Peripheral  Gram Stain (10-03-23 @ 13:14):    Growth in aerobic bottle: Gram Negative Rods  Final Report (10-05-23 @ 10:55):    Growth in aerobic bottle: Escherichia coli    Direct identification is available within approximately 3-5    hours either by Blood Panel Multiplexed PCR or Direct    MALDI-TOF. Details: [email or web address]  Organism: Blood Culture PCR  Escherichia coli (10-05-23 @ 10:55)  Organism: Escherichia coli (10-05-23 @ 10:55)    Sensitivities:      Method Type: HILARIA      -  Amikacin: S <=16      -  Ampicillin: S <=8 These ampicillin results predict results for amoxicillin      -  Ampicillin/Sulbactam: S <=4/2      -  Aztreonam: S <=4      -  Cefazolin: S <=2      -  Cefepime: S <=2      -  Cefoxitin: S <=8      -  Ceftriaxone: S <=1      -  Ciprofloxacin: S <=0.25      -  Ertapenem: S <=0.5      -  Gentamicin: S <=2      -  Imipenem: S <=1      -  Levofloxacin: S <=0.5      -  Meropenem: S <=1      -  Piperacillin/Tazobactam: S <=8      -  Tobramycin: S <=2      -  Trimethoprim/Sulfamethoxazole: S <=0.5/9.5  Organism: Blood Culture PCR (10-05-23 @ 10:55)    Sensitivities:      Method Type: PCR      -  Escherichia coli: Detec    Culture - Blood (collected 10-02-23 @ 20:00)  Source: .Blood Blood-Peripheral      Creatinine: 0.30 mg/dL (10-05-23 @ 16:20)  Creatinine: 0.33 mg/dL (10-05-23 @ 03:10)  Creatinine: 0.26 mg/dL (10-04-23 @ 17:45)  Creatinine: 0.30 mg/dL (10-04-23 @ 03:57)  Creatinine: 0.39 mg/dL (10-03-23 @ 17:45)  Creatinine: 0.41 mg/dL (10-03-23 @ 15:40)  Creatinine: 0.37 mg/dL (10-03-23 @ 07:14)  Creatinine: 0.38 mg/dL (10-02-23 @ 20:20)        Ferritin: 57 ng/mL (10-03-23 @ 07:14)      WBC Count: 5.26 K/uL (10-05-23 @ 16:20)  WBC Count: 8.18 K/uL (10-05-23 @ 03:10)  WBC Count: 9.54 K/uL (10-04-23 @ 03:57)  WBC Count: 7.38 K/uL (10-03-23 @ 17:45)  WBC Count: 7.97 K/uL (10-03-23 @ 07:14)  WBC Count: 8.70 K/uL (10-02-23 @ 20:20)    SARS-CoV-2: NotDetec (10-02-23 @ 20:50)      Alkaline Phosphatase: 55 U/L (10-05-23 @ 03:10)  Alkaline Phosphatase: 56 U/L (10-04-23 @ 17:45)  Alkaline Phosphatase: 56 U/L (10-02-23 @ 20:20)  Alanine Aminotransferase (ALT/SGPT): 22 U/L (10-05-23 @ 03:10)  Alanine Aminotransferase (ALT/SGPT): 15 U/L (10-04-23 @ 17:45)  Alanine Aminotransferase (ALT/SGPT): 9 U/L (10-02-23 @ 20:20)  Aspartate Aminotransferase (AST/SGOT): 40 U/L (10-05-23 @ 03:10)  Aspartate Aminotransferase (AST/SGOT): 25 U/L (10-04-23 @ 17:45)  Aspartate Aminotransferase (AST/SGOT): 15 U/L (10-02-23 @ 20:20)  Bilirubin Total: 0.5 mg/dL (10-05-23 @ 03:10)  Bilirubin Total: 0.5 mg/dL (10-04-23 @ 17:45)  Bilirubin Total: 0.6 mg/dL (10-02-23 @ 20:20)  Bilirubin Direct: 0.2 mg/dL (10-04-23 @ 17:45)

## 2023-10-05 NOTE — DIETITIAN INITIAL EVALUATION ADULT - OTHER INFO
22 yo F with no significant pmhx. Recently came here from [facility] 6 months ago. Rest of family is still in [facility]. No pregnancy complications either in this pregnancy or previous pregnancies. Patient follows with [facility] because current pregnancy is Di-Di twins, high risk. Pregnant, 17 weeks, presented to the ED complaining of abdominal pain, flank pain b/l, nausea, vomiting, and fever on 10/2. Admitted to [facility] service for sepsis 2/2 UTI. Initially started on Rocephin broadened to Zosyn last night after patient spiked a fever to 102.4F at 3am. 10/3 persistently tachy, hypotensive to 70s/40s. Was on maintenance fluids 125LR and got 1L bolus with no improvement. Admitted to ICU for hypotension 2/2 septic shock 2/2 urosepsis, 10/3. On norepinephrine and LR. Also, found to be hypokalemic, hypomagnesemia, electrolytes replaced.

## 2023-10-05 NOTE — PROGRESS NOTE ADULT - ASSESSMENT
23y  at 17.1wks GA by first trimester sonogram. She is hospitalized for urosepsis. Currently on Zosyn.  Admitted to MICU. 23y  at 17.1wks GA by first trimester sonogram. She is hospitalized for urosepsis. Currently on Zosyn.  Admitted to MICU.

## 2023-10-05 NOTE — PROGRESS NOTE ADULT - ASSESSMENT
23y  at 17wks GA by first trimester sonogram. She is hospitalized for urosepsis. Currently on Zosyn. Admitted to MICU requiring pressor support.     Plan:     Urosepsis:   - Blood cultures, 10/2, E. Coli   - UC, 10/2, E. Coli, sensitivities resulted   - Rocephin, DCd 10/3  - C/W Zosyn  - RVP/COVID 19 PCR, negative   - UA showed leukocyte, nitrite, bacteria, WBCs+   - US renal, b/l hydronephrosis   - Hypotensive, tachycardic  - placed on norepinephrine, currently 0.19 meq/kg/hr   - MFM consulted, following recs   - MRI abdomen, gravid uterus obstructing flow  - Urology consulted, no intervention required  - Continue Zosyn  - Trend Fever   - Trend WBC    Pregnancy   - At Increased risk for  morbidity and mortality due to  delivery. Twin pregnancies are also at risk for intrauterine fetal growth restriction. There is also an increased risk for maternal anemia, gestational diabetes, urinary tract infections, and postpartum hemorrhage.  -FH daily 150's/140's today   -Up to date on prenatals   - [facility] sonogram: AA: inferior transverse, posterior placenta, EFW: 137g 91%ile, BB: superior, anterior, EFW:123g 66%ile; pt will need outpatient follow up in approximately 1 week after discharge for cervical length and fetal anatomy scan.    Hypo K/Na/Mg  - K+, Mg resolved    - 132 Na on AM CMP   - Encourage PO intake     Flank Pain:   -  mg PO q8 PRN   - Dilaudid 2 mg PO q6 PRN   - Tylenol 975 mg PO q6 PRN

## 2023-10-05 NOTE — PROGRESS NOTE ADULT - SUBJECTIVE AND OBJECTIVE BOX
INTERVAL HPI/OVERNIGHT EVENTS:  23 F No PMHx. Recently came here from [facility] 6 months ago. [facility] No pregnancy complications either in this pregnancy or previous pregnancies. Patient follows with [facility] because current pregnancy is Di-Di twins, high risk. Pregnant, 17 weeks,  presented to the ED complaining of abdominal pain, flank pain b/l, nausea, vomiting, and fever on 10/2. Admitted to [facility] service for sepsis 2/2 UTI. Initially started on rocephin, broadened to Zosyn last night after patient spiked a fever to 102.4F at 3am. 10/3 persistently tachy, hypotensive to 70s/40s. Was on maintenance fluids 125LR and got 1L bolus with no improvement.     Overnight: admitted to ICU for hypotension 2/2 septic shock 2/2 urosepsis. On norepinephrine and LR. Also, found to be hypokalemic, hypomagnesemia, electrolytes replaced.     SUBJECTIVE: Patient seen and examined at bedside. Complaining of b/l flank pain, fevers, chills. Denies, dysuria, hematuria, change in frequency, stones or history of stones. US renal resulted, b/l hydronephrosis. Hyponatremic, hypokalemic, hypomagnesemia, electrolytes replaced. [facility] consulted, recommended Meadows Dilaudid and AZO placed due to pain after Meadows placement. Urology consulted due to b/l hydronephrosis, CT renal stone hunt w/o contrast.   SUBJECTIVE: Patient seen and examined at bedside.     ROS: All negative except as listed above.    VITAL SIGNS:  ICU Vital Signs Last 24 Hrs  T(C): 36.7 (05 Oct 2023 11:05), Max: 38.3 (04 Oct 2023 23:11)  T(F): 98 (05 Oct 2023 11:05), Max: 100.9 (04 Oct 2023 23:11)  HR: 125 (05 Oct 2023 12:30) (60 - 125)  BP: 100/58 (05 Oct 2023 12:30) (57/31 - 121/89)  BP(mean): 71 (05 Oct 2023 12:30) (39 - 100)  ABP: --  ABP(mean): --  RR: 20 (05 Oct 2023 12:30) (0 - 29)  SpO2: 100% (05 Oct 2023 12:30) (89% - 100%)    O2 Parameters below as of 05 Oct 2023 08:00  Patient On (Oxygen Delivery Method): room air            Plateau pressure:   P/F ratio:     10-04 @ :  -  10-05 @ 07:00  --------------------------------------------------------  IN: 2699.2 mL / OUT: 3002 mL / NET: -302.8 mL    10-05 @ :  -  10-05 @ 12:39  --------------------------------------------------------  IN: 62 mL / OUT: 625 mL / NET: -563 mL      CAPILLARY BLOOD GLUCOSE      POCT Blood Glucose.: 77 mg/dL (03 Oct 2023 15:49)      ECG: reviewed.    PHYSICAL EXAM:    GENERAL: NAD, lying in bed comfortably  HEAD:  Atraumatic, normocephalic  EYES: EOMI, PERRLA, conjunctiva and sclera clear  NECK: Supple, trachea midline, no JVD  HEART: Regular rate and rhythm, no murmurs, rubs, or gallops  LUNGS: Unlabored respirations.  Clear to auscultation bilaterally, no crackles, wheezing, or rhonchi  ABDOMEN: Soft, nontender, nondistended, +BS  EXTREMITIES: 2+ peripheral pulses bilaterally, cap refill<2 secs. No clubbing, cyanosis, or edema  NERVOUS SYSTEM:  A&Ox3, following commands, moving all extremities, no focal deficits   SKIN: No rashes or lesions    MEDICATIONS:  MEDICATIONS  (STANDING):  chlorhexidine 2% Cloths 1 Application(s) Topical <User Schedule>  ferrous    sulfate 325 milliGRAM(s) Oral daily  influenza   Vaccine 0.5 milliLiter(s) IntraMuscular once  norepinephrine Infusion 0.05 MICROgram(s)/kG/Min (6.89 mL/Hr) IV Continuous <Continuous>  phenazopyridine 100 milliGRAM(s) Oral every 8 hours  piperacillin/tazobactam IVPB.. 3.375 Gram(s) IV Intermittent every 8 hours  prenatal multivitamin 1 Tablet(s) Oral daily  senna 2 Tablet(s) Oral at bedtime    MEDICATIONS  (PRN):  acetaminophen     Tablet .. 975 milliGRAM(s) Oral every 6 hours PRN Temp greater or equal to 38C (100.4F), Mild Pain (1 - 3)      ALLERGIES:  Allergies    No Known Allergies    Intolerances        LABS:                        9.5    8.18  )-----------( 207      ( 05 Oct 2023 03:10 )             27.9     10-    132<L>  |  101  |  <3.0<L>  ----------------------------<  93  3.6   |  17.0<L>  |  0.33<L>    Ca    8.7      05 Oct 2023 03:10  Phos  3.2     10-  Mg     1.9     10-05    TPro  5.9<L>  /  Alb  3.0<L>  /  TBili  0.5  /  DBili  x   /  AST  40<H>  /  ALT  22  /  AlkPhos  55  10-05    PTT - ( 03 Oct 2023 20:00 )  PTT:32.2 sec  Urinalysis Basic - ( 05 Oct 2023 03:10 )    Color: x / Appearance: x / SG: x / pH: x  Gluc: 93 mg/dL / Ketone: x  / Bili: x / Urobili: x   Blood: x / Protein: x / Nitrite: x   Leuk Esterase: x / RBC: x / WBC x   Sq Epi: x / Non Sq Epi: x / Bacteria: x      ABG:      vBG:    Micro:    Culture - Blood (collected 10-02-23 @ 20:08)  Source: .Blood Blood-Peripheral  Gram Stain (10-03-23 @ 13:14):    Growth in aerobic bottle: Gram Negative Rods  Final Report (10-05-23 @ 10:55):    Growth in aerobic bottle: Escherichia coli    Direct identification is available within approximately 3-5    hours either by Blood Panel Multiplexed PCR or Direct    MALDI-TOF. Details: [email or web address]  Organism: Blood Culture PCR  Escherichia coli (10-05-23 @ 10:55)  Organism: Escherichia coli (10-05-23 @ 10:55)      Method Type: HILARIA      -  Amikacin: S <=16      -  Ampicillin: S <=8 These ampicillin results predict results for amoxicillin      -  Ampicillin/Sulbactam: S <=4/2      -  Aztreonam: S <=4      -  Cefazolin: S <=2      -  Cefepime: S <=2      -  Cefoxitin: S <=8      -  Ceftriaxone: S <=1      -  Ciprofloxacin: S <=0.25      -  Ertapenem: S <=0.5      -  Gentamicin: S <=2      -  Imipenem: S <=1      -  Levofloxacin: S <=0.5      -  Meropenem: S <=1      -  Piperacillin/Tazobactam: S <=8      -  Tobramycin: S <=2      -  Trimethoprim/Sulfamethoxazole: S <=0.5/9.5  Organism: Blood Culture PCR (10-05-23 @ 10:55)      Method Type: PCR      -  Escherichia coli: Detec    Culture - Blood (collected 10-02-23 @ 20:00)  Source: .Blood Blood-Peripheral  Preliminary Report (10-05-23 @ 02:01):    No growth at 48 Hours          RADIOLOGY & ADDITIONAL TESTS: Reviewed.   INTERVAL HPI/OVERNIGHT EVENTS:  23 F No PMHx. Recently came here from [facility] 6 months ago. Rest of family is still in [facility]. No pregnancy complications either in this pregnancy or previous pregnancies. Patient follows with [facility] because current pregnancy is Di-Di twins, high risk. Pregnant, 17 weeks,  presented to the ED complaining of abdominal pain, flank pain b/l, nausea, vomiting, and fever on 10/2. Admitted to [facility] service for sepsis 2/2 UTI. Initially started on Rocephin broadened to Zosyn last night after patient spiked a fever to 102.4F at 3am. 10/3 persistently tachy, hypotensive to 70s/40s. Was on maintenance fluids 125LR and got 1L bolus with no improvement. Admitted to ICU for hypotension 2/2 septic shock 2/2 urosepsis, 10/3. On norepinephrine and LR. Also, found to be hypokalemic, hypomagnesemia, electrolytes replaced.     SUBJECTIVE: Patient seen and examined at bedside. Continues to complain of b/l flank pain. Denies, dysuria, hematuria, change in frequency, stones. US renal resulted, b/l hydronephrosis. MRI abdomen showed no obstruction within the  system, hydronephrosis likely caused by gravid uterus pressing on  structures. Urology found no need for stent placement, continueing with medical management [facility] following, no new recommendations. Meadows still in place, Norepinephrine decreased.     ROS: All negative except as listed above.    VITAL SIGNS:  ICU Vital Signs Last 24 Hrs  T(C): 36.7 (05 Oct 2023 11:05), Max: 38.3 (04 Oct 2023 23:11)  T(F): 98 (05 Oct 2023 11:05), Max: 100.9 (04 Oct 2023 23:11)  HR: 125 (05 Oct 2023 12:30) (60 - 125)  BP: 100/58 (05 Oct 2023 12:30) (57/31 - 121/89)  BP(mean): 71 (05 Oct 2023 12:30) (39 - 100)  ABP: --  ABP(mean): --  RR: 20 (05 Oct 2023 12:30) (0 - 29)  SpO2: 100% (05 Oct 2023 12:30) (89% - 100%)    O2 Parameters below as of 05 Oct 2023 08:00  Patient On (Oxygen Delivery Method): room air            Plateau pressure:   P/F ratio:     10-04 @ :01  -  10-05 @ 07:00  --------------------------------------------------------  IN: 2699.2 mL / OUT: 3002 mL / NET: -302.8 mL    10-05 @ :  -  10-05 @ 12:39  --------------------------------------------------------  IN: 62 mL / OUT: 625 mL / NET: -563 mL      CAPILLARY BLOOD GLUCOSE      POCT Blood Glucose.: 77 mg/dL (03 Oct 2023 15:49)      ECG: reviewed.    PHYSICAL EXAM:    GENERAL: NAD, lying in bed comfortably  HEAD:  Atraumatic, normocephalic  EYES: EOMI, PERRLA, conjunctiva and sclera clear  NECK: Supple, trachea midline, no JVD  HEART: Regular rate and rhythm, no murmurs, rubs, or gallops  LUNGS: Unlabored respirations.  Clear to auscultation bilaterally, no crackles, wheezing, or rhonchi  ABDOMEN: Soft, LLQ and RLQ tenderness, nondistended, +BS  EXTREMITIES: 2+ peripheral pulses bilaterally, cap refill<2 secs. No clubbing, cyanosis, or edema  NERVOUS SYSTEM:  A&Ox3, following commands, moving all extremities, no focal deficits   SKIN: No rashes or lesions    MEDICATIONS:  MEDICATIONS  (STANDING):  chlorhexidine 2% Cloths 1 Application(s) Topical <User Schedule>  ferrous    sulfate 325 milliGRAM(s) Oral daily  influenza   Vaccine 0.5 milliLiter(s) IntraMuscular once  norepinephrine Infusion 0.05 MICROgram(s)/kG/Min (6.89 mL/Hr) IV Continuous <Continuous>  phenazopyridine 100 milliGRAM(s) Oral every 8 hours  piperacillin/tazobactam IVPB.. 3.375 Gram(s) IV Intermittent every 8 hours  prenatal multivitamin 1 Tablet(s) Oral daily  senna 2 Tablet(s) Oral at bedtime    MEDICATIONS  (PRN):  acetaminophen     Tablet .. 975 milliGRAM(s) Oral every 6 hours PRN Temp greater or equal to 38C (100.4F), Mild Pain (1 - 3)      ALLERGIES:  Allergies    No Known Allergies    Intolerances        LABS:                        9.5    8.18  )-----------( 207      ( 05 Oct 2023 03:10 )             27.9     10-    132<L>  |  101  |  <3.0<L>  ----------------------------<  93  3.6   |  17.0<L>  |  0.33<L>    Ca    8.7      05 Oct 2023 03:10  Phos  3.2     10-05  Mg     1.9     10-05    TPro  5.9<L>  /  Alb  3.0<L>  /  TBili  0.5  /  DBili  x   /  AST  40<H>  /  ALT  22  /  AlkPhos  55  10-05    PTT - ( 03 Oct 2023 20:00 )  PTT:32.2 sec  Urinalysis Basic - ( 05 Oct 2023 03:10 )    Color: x / Appearance: x / SG: x / pH: x  Gluc: 93 mg/dL / Ketone: x  / Bili: x / Urobili: x   Blood: x / Protein: x / Nitrite: x   Leuk Esterase: x / RBC: x / WBC x   Sq Epi: x / Non Sq Epi: x / Bacteria: x      ABG:      vBG:    Micro:    Culture - Blood (collected 10-02-23 @ 20:08)  Source: .Blood Blood-Peripheral  Gram Stain (10-03-23 @ 13:14):    Growth in aerobic bottle: Gram Negative Rods  Final Report (10-05-23 @ 10:55):    Growth in aerobic bottle: Escherichia coli    Direct identification is available within approximately 3-5    hours either by Blood Panel Multiplexed PCR or Direct    MALDI-TOF. Details: [email or web address]  Organism: Blood Culture PCR  Escherichia coli (10-05-23 @ 10:55)  Organism: Escherichia coli (10-05-23 @ 10:55)      Method Type: HILARIA      -  Amikacin: S <=16      -  Ampicillin: S <=8 These ampicillin results predict results for amoxicillin      -  Ampicillin/Sulbactam: S <=4/2      -  Aztreonam: S <=4      -  Cefazolin: S <=2      -  Cefepime: S <=2      -  Cefoxitin: S <=8      -  Ceftriaxone: S <=1      -  Ciprofloxacin: S <=0.25      -  Ertapenem: S <=0.5      -  Gentamicin: S <=2      -  Imipenem: S <=1      -  Levofloxacin: S <=0.5      -  Meropenem: S <=1      -  Piperacillin/Tazobactam: S <=8      -  Tobramycin: S <=2      -  Trimethoprim/Sulfamethoxazole: S <=0.5/9.5  Organism: Blood Culture PCR (10-05-23 @ 10:55)      Method Type: PCR      -  Escherichia coli: Detec    Culture - Blood (collected 10-02-23 @ 20:00)  Source: .Blood Blood-Peripheral  Preliminary Report (10-05-23 @ 02:01):    No growth at 48 Hours          RADIOLOGY & ADDITIONAL TESTS: Reviewed.   INTERVAL HPI/OVERNIGHT EVENTS:  23 F No PMHx. Recently came here from [facility] 6 months ago. Rest of family is still in [facility]. No pregnancy complications either in this pregnancy or previous pregnancies. Patient follows with [facility] because current pregnancy is Di-Di twins, high risk. Pregnant, 17 weeks,  presented to the ED complaining of abdominal pain, flank pain b/l, nausea, vomiting, and fever on 10/2. Admitted to [facility] service for sepsis 2/2 UTI. Initially started on Rocephin broadened to Zosyn last night after patient spiked a fever to 102.4F at 3am. 10/3 persistently tachy, hypotensive to 70s/40s. Was on maintenance fluids 125LR and got 1L bolus with no improvement. Admitted to ICU for hypotension 2/2 septic shock 2/2 urosepsis, 10/3. On norepinephrine and LR. Also, found to be hypokalemic, hypomagnesemia, electrolytes replaced.     SUBJECTIVE: Patient seen and examined at bedside. Continues to complain of b/l flank pain. Denies, dysuria, hematuria, change in frequency, stones. US renal resulted, b/l hydronephrosis. MRI abdomen showed no obstruction within the  system, hydronephrosis likely caused by gravid uterus pressing on  structures. Urology found no need for stent placement, continuing with medical management [facility] following, no new recommendations. Meadows still in place, Norepinephrine decreased.     ROS: All negative except as listed above.    VITAL SIGNS:  ICU Vital Signs Last 24 Hrs  T(C): 36.7 (05 Oct 2023 11:05), Max: 38.3 (04 Oct 2023 23:11)  T(F): 98 (05 Oct 2023 11:05), Max: 100.9 (04 Oct 2023 23:11)  HR: 125 (05 Oct 2023 12:30) (60 - 125)  BP: 100/58 (05 Oct 2023 12:30) (57/31 - 121/89)  BP(mean): 71 (05 Oct 2023 12:30) (39 - 100)  ABP: --  ABP(mean): --  RR: 20 (05 Oct 2023 12:30) (0 - 29)  SpO2: 100% (05 Oct 2023 12:30) (89% - 100%)    O2 Parameters below as of 05 Oct 2023 08:00  Patient On (Oxygen Delivery Method): room air            Plateau pressure:   P/F ratio:     10-04 @ :01  -  10-05 @ 07:00  --------------------------------------------------------  IN: 2699.2 mL / OUT: 3002 mL / NET: -302.8 mL    10-05 @ :  -  10-05 @ 12:39  --------------------------------------------------------  IN: 62 mL / OUT: 625 mL / NET: -563 mL      CAPILLARY BLOOD GLUCOSE      POCT Blood Glucose.: 77 mg/dL (03 Oct 2023 15:49)      ECG: reviewed.    PHYSICAL EXAM:    GENERAL: NAD, lying in bed comfortably  HEAD:  Atraumatic, normocephalic  EYES: EOMI, PERRLA, conjunctiva and sclera clear  NECK: Supple, trachea midline, no JVD  HEART: Regular rate and rhythm, no murmurs, rubs, or gallops  LUNGS: Unlabored respirations.  Clear to auscultation bilaterally, no crackles, wheezing, or rhonchi  ABDOMEN: Soft, LLQ and RLQ tenderness, nondistended, +BS  EXTREMITIES: 2+ peripheral pulses bilaterally, cap refill<2 secs. No clubbing, cyanosis, or edema  NERVOUS SYSTEM:  A&Ox3, following commands, moving all extremities, no focal deficits   SKIN: No rashes or lesions    MEDICATIONS:  MEDICATIONS  (STANDING):  chlorhexidine 2% Cloths 1 Application(s) Topical <User Schedule>  ferrous    sulfate 325 milliGRAM(s) Oral daily  influenza   Vaccine 0.5 milliLiter(s) IntraMuscular once  norepinephrine Infusion 0.05 MICROgram(s)/kG/Min (6.89 mL/Hr) IV Continuous <Continuous>  phenazopyridine 100 milliGRAM(s) Oral every 8 hours  piperacillin/tazobactam IVPB.. 3.375 Gram(s) IV Intermittent every 8 hours  prenatal multivitamin 1 Tablet(s) Oral daily  senna 2 Tablet(s) Oral at bedtime    MEDICATIONS  (PRN):  acetaminophen     Tablet .. 975 milliGRAM(s) Oral every 6 hours PRN Temp greater or equal to 38C (100.4F), Mild Pain (1 - 3)      ALLERGIES:  Allergies    No Known Allergies    Intolerances        LABS:                        9.5    8.18  )-----------( 207      ( 05 Oct 2023 03:10 )             27.9     10-    132<L>  |  101  |  <3.0<L>  ----------------------------<  93  3.6   |  17.0<L>  |  0.33<L>    Ca    8.7      05 Oct 2023 03:10  Phos  3.2     10-05  Mg     1.9     10-05    TPro  5.9<L>  /  Alb  3.0<L>  /  TBili  0.5  /  DBili  x   /  AST  40<H>  /  ALT  22  /  AlkPhos  55  10-05    PTT - ( 03 Oct 2023 20:00 )  PTT:32.2 sec  Urinalysis Basic - ( 05 Oct 2023 03:10 )    Color: x / Appearance: x / SG: x / pH: x  Gluc: 93 mg/dL / Ketone: x  / Bili: x / Urobili: x   Blood: x / Protein: x / Nitrite: x   Leuk Esterase: x / RBC: x / WBC x   Sq Epi: x / Non Sq Epi: x / Bacteria: x      ABG:      vBG:    Micro:    Culture - Blood (collected 10-02-23 @ 20:08)  Source: .Blood Blood-Peripheral  Gram Stain (10-03-23 @ 13:14):    Growth in aerobic bottle: Gram Negative Rods  Final Report (10-05-23 @ 10:55):    Growth in aerobic bottle: Escherichia coli    Direct identification is available within approximately 3-5    hours either by Blood Panel Multiplexed PCR or Direct    MALDI-TOF. Details: [email or web address]  Organism: Blood Culture PCR  Escherichia coli (10-05-23 @ 10:55)  Organism: Escherichia coli (10-05-23 @ 10:55)      Method Type: HILARIA      -  Amikacin: S <=16      -  Ampicillin: S <=8 These ampicillin results predict results for amoxicillin      -  Ampicillin/Sulbactam: S <=4/2      -  Aztreonam: S <=4      -  Cefazolin: S <=2      -  Cefepime: S <=2      -  Cefoxitin: S <=8      -  Ceftriaxone: S <=1      -  Ciprofloxacin: S <=0.25      -  Ertapenem: S <=0.5      -  Gentamicin: S <=2      -  Imipenem: S <=1      -  Levofloxacin: S <=0.5      -  Meropenem: S <=1      -  Piperacillin/Tazobactam: S <=8      -  Tobramycin: S <=2      -  Trimethoprim/Sulfamethoxazole: S <=0.5/9.5  Organism: Blood Culture PCR (10-05-23 @ 10:55)      Method Type: PCR      -  Escherichia coli: Detec    Culture - Blood (collected 10-02-23 @ 20:00)  Source: .Blood Blood-Peripheral  Preliminary Report (10-05-23 @ 02:01):    No growth at 48 Hours          RADIOLOGY & ADDITIONAL TESTS: Reviewed.

## 2023-10-05 NOTE — PROGRESS NOTE ADULT - SUBJECTIVE AND OBJECTIVE BOX
ASHISH YANES  [facility] 3EST 3101 01    23y  at 17.1wks GA by first trimester sonogram. She is hospitalized for urosepsis. Currently on Zosyn.  Admitted to MICU.    Last 24 hours:   Renal sono showing bilateral hydronephrosis, MRI consistent with this, no stones. Pt continues to require pressor support- down titrated to 17 from 26. Tmax 100.9 23:00 10/4.    Pt seen and examined at bedside. Continues to c/o of pain from the day. Reports her back pain is better than it was yesterday. She denies any dizziness, palpitations, n/v/d. She denies any obstetrical complaints.       Vital Signs:  Vital Signs Last 24 Hrs  T(C): 36.6 (05 Oct 2023 07:07), Max: 38.8 (04 Oct 2023 11:00)  T(F): 97.8 (05 Oct 2023 07:07), Max: 101.8 (04 Oct 2023 11:00)  HR: 73 (05 Oct 2023 08:15) (60 - 118)  BP: 99/68 (05 Oct 2023 08:15) (57/31 - 121/89)  BP(mean): 79 (05 Oct 2023 08:15) (39 - 100)  RR: 19 (05 Oct 2023 08:15) (0 - 29)  SpO2: 99% (05 Oct 2023 08:15) (89% - 100%)    Parameters below as of 05 Oct 2023 08:00  Patient On (Oxygen Delivery Method): room air          Physical Exam:  General: Adult female in NAD  Lungs: CTAB, no wheezing, rhonchi or rales  Abdomen: soft, non-tender, gravid uterus  Ext: No cyanosis, edema or calf tenderness  Skin: No rashes or lesions on exposed skin      Labs:                          9.5    8.18  )-----------( 207      ( 05 Oct 2023 03:10 )             27.9     10-05    132<L>  |  101  |  <3.0<L>  ----------------------------<  93  3.6   |  17.0<L>  |  0.33<L>    Ca    8.7      05 Oct 2023 03:10  Phos  3.2     10-05  Mg     1.9     10-05    TPro  5.9<L>  /  Alb  3.0<L>  /  TBili  0.5  /  DBili  x   /  AST  40<H>  /  ALT  22  /  AlkPhos  55  10-05    PTT - ( 03 Oct 2023 20:00 )  PTT:32.2 sec      Culture - Urine (collected 10-02-23 @ 22:25)  Source: Clean Catch Clean Catch (Midstream)  Preliminary Report (10-04-23 @ 14:31):    10,000 - 49,000 CFU/mL Escherichia coli    Culture - Blood (collected 10-02-23 @ 20:08)  Source: .Blood Blood-Peripheral  Gram Stain (10-03-23 @ 13:14):    Growth in aerobic bottle: Gram Negative Rods  Preliminary Report (10-04-23 @ 12:44):    Growth in aerobic bottle: Escherichia coli    Direct identification is available within approximately 3-5    hours either by Blood Panel Multiplexed PCR or Direct    MALDI-TOF. Details: [email or web address]  Organism: Blood Culture PCR (10-03-23 @ 15:45)  Organism: Blood Culture PCR (10-03-23 @ 15:45)      Method Type: PCR      -  Escherichia coli: Detec    Culture - Blood (collected 10-02-23 @ 20:00)  Source: .Blood Blood-Peripheral  Preliminary Report (10-05-23 @ 02:01):    No growth at 48 Hours        MEDICATIONS  (STANDING):  chlorhexidine 2% Cloths 1 Application(s) Topical <User Schedule>  ferrous    sulfate 325 milliGRAM(s) Oral daily  influenza   Vaccine 0.5 milliLiter(s) IntraMuscular once  norepinephrine Infusion 0.05 MICROgram(s)/kG/Min (6.89 mL/Hr) IV Continuous <Continuous>  phenazopyridine 100 milliGRAM(s) Oral every 8 hours  piperacillin/tazobactam IVPB.. 3.375 Gram(s) IV Intermittent every 8 hours  prenatal multivitamin 1 Tablet(s) Oral daily    MEDICATIONS  (PRN):  acetaminophen     Tablet .. 975 milliGRAM(s) Oral every 6 hours PRN Temp greater or equal to 38C (100.4F), Mild Pain (1 - 3)  HYDROmorphone   Tablet 2 milliGRAM(s) Oral every 6 hours PRN Moderate Pain (4 - 6)   ASHISH YANES  [facility] 3EST 3101 01    23y  at 17.1wks GA by first trimester sonogram. She is hospitalized for urosepsis. Currently on Zosyn.  Admitted to MICU.    Last 24 hours:   Renal sono showing bilateral hydronephrosis, MRI consistent with this, no stones. Pt continues to require pressor support- down titrated to 17 from 26. Tmax 100.9 23:00 10/4.    Pt seen and examined at bedside. Continues to c/o of pain from the day. Reports her back pain is better than it was yesterday. She denies any dizziness, palpitations, n/v/d. She denies any obstetrical complaints.       Vital Signs:  Vital Signs Last 24 Hrs  T(C): 36.6 (05 Oct 2023 07:07), Max: 38.8 (04 Oct 2023 11:00)  T(F): 97.8 (05 Oct 2023 07:07), Max: 101.8 (04 Oct 2023 11:00)  HR: 73 (05 Oct 2023 08:15) (60 - 118)  BP: 99/68 (05 Oct 2023 08:15) (57/31 - 121/89)  BP(mean): 79 (05 Oct 2023 08:15) (39 - 100)  RR: 19 (05 Oct 2023 08:15) (0 - 29)  SpO2: 99% (05 Oct 2023 08:15) (89% - 100%)    Parameters below as of 05 Oct 2023 08:00  Patient On (Oxygen Delivery Method): room air          Physical Exam:  General: Adult female in NAD  Lungs: CTAB, no wheezing, rhonchi or rales  Abdomen: soft, non-tender, gravid uterus  Ext: No cyanosis, edema or calf tenderness  Skin: No rashes or lesions on exposed skin      Labs:                          9.5    8.18  )-----------( 207      ( 05 Oct 2023 03:10 )             27.9     10-05    132<L>  |  101  |  <3.0<L>  ----------------------------<  93  3.6   |  17.0<L>  |  0.33<L>    Ca    8.7      05 Oct 2023 03:10  Phos  3.2     10-05  Mg     1.9     10-05    TPro  5.9<L>  /  Alb  3.0<L>  /  TBili  0.5  /  DBili  x   /  AST  40<H>  /  ALT  22  /  AlkPhos  55  10-05    PTT - ( 03 Oct 2023 20:00 )  PTT:32.2 sec      Culture - Urine (collected 10-02-23 @ 22:25)  Source: Clean Catch Clean Catch (Midstream)  Preliminary Report (10-04-23 @ 14:31):    10,000 - 49,000 CFU/mL Escherichia coli    Culture - Blood (collected 10-02-23 @ 20:08)  Source: .Blood Blood-Peripheral  Gram Stain (10-03-23 @ 13:14):    Growth in aerobic bottle: Gram Negative Rods  Preliminary Report (10-04-23 @ 12:44):    Growth in aerobic bottle: Escherichia coli    Direct identification is available within approximately 3-5    hours either by Blood Panel Multiplexed PCR or Direct    MALDI-TOF. Details: [email or web address]  Organism: Blood Culture PCR (10-03-23 @ 15:45)  Organism: Blood Culture PCR (10-03-23 @ 15:45)      Method Type: PCR      -  Escherichia coli: Detec    Culture - Blood (collected 10-02-23 @ 20:00)  Source: .Blood Blood-Peripheral   Preliminary Report (10-05-23 @ 02:01):    No growth at 48 Hours        MEDICATIONS  (STANDING):  chlorhexidine 2% Cloths 1 Application(s) Topical <User Schedule>  ferrous    sulfate 325 milliGRAM(s) Oral daily  influenza   Vaccine 0.5 milliLiter(s) IntraMuscular once  norepinephrine Infusion 0.05 MICROgram(s)/kG/Min (6.89 mL/Hr) IV Continuous <Continuous>  phenazopyridine 100 milliGRAM(s) Oral every 8 hours  piperacillin/tazobactam IVPB.. 3.375 Gram(s) IV Intermittent every 8 hours  prenatal multivitamin 1 Tablet(s) Oral daily    MEDICATIONS  (PRN):  acetaminophen     Tablet .. 975 milliGRAM(s) Oral every 6 hours PRN Temp greater or equal to 38C (100.4F), Mild Pain (1 - 3)  HYDROmorphone   Tablet 2 milliGRAM(s) Oral every 6 hours PRN Moderate Pain (4 - 6)

## 2023-10-05 NOTE — DIETITIAN INITIAL EVALUATION ADULT - PERTINENT LABORATORY DATA
10-05    132<L>  |  101  |  <3.0<L>  ----------------------------<  93  3.6   |  17.0<L>  |  0.33<L>    Ca    8.7      05 Oct 2023 03:10  Phos  3.2     10-05  Mg     1.9     10-05    TPro  5.9<L>  /  Alb  3.0<L>  /  TBili  0.5  /  DBili  x   /  AST  40<H>  /  ALT  22  /  AlkPhos  55  10-05

## 2023-10-05 NOTE — DIETITIAN INITIAL EVALUATION ADULT - ADD RECOMMEND
Encourage PO intake and HBV protein food options  Cont Rx: prenatal MVI  Monitor electrolytes, correct as needed  Add Ensure Plus BID (350 kcals, 20 g pro each) to supplement

## 2023-10-05 NOTE — PROGRESS NOTE ADULT - PROBLEM SELECTOR PLAN 3
-Up to date on prenatals   -9/20 MFM sonogram: AA: inferior transverse, posterior placenta, EFW: 137g 91%ile, BB: superior, anterior, EFW:123g 66%ile; pt will need outpatient follow up in approximately 1 week after discharge for cervical length and fetal anatomy scan.

## 2023-10-05 NOTE — DIETITIAN INITIAL EVALUATION ADULT - CALCULATED FROM (CAL/KG)
9701 Finasteride Male Counseling: Finasteride Counseling:  I discussed with the patient the risks of use of finasteride including but not limited to decreased libido, decreased ejaculate volume, gynecomastia, and depression. Women should not handle medication.  All of the patient's questions and concerns were addressed. Finasteride Counseling:  I discussed with the patient the risks of use of finasteride including but not limited to decreased libido, decreased ejaculate volume, gynecomastia, and depression. Women should not handle medication.  All of the patient's questions and concerns were addressed.

## 2023-10-05 NOTE — PROGRESS NOTE ADULT - SUBJECTIVE AND OBJECTIVE BOX
see detailed note from yesterday  discussed the results of the MRI yesterday evening with no signs of internal obstruction of the ureter  and only suspected compression due to the pregnancy  explained there is no need for insertion of ureteral stents or drainage of the kidneys at this point    patient examined at bedside today  looks comfortable, eating and feeling much better  will continue with treatment based on ICU and MFM recommendations

## 2023-10-05 NOTE — DIETITIAN INITIAL EVALUATION ADULT - ORAL INTAKE PTA/DIET HISTORY
Pt unavailable at time of attempted visits, f/u for nutrition assessment at more appropriate time. Pt was NPO 10/4, started on Regular diet today.

## 2023-10-05 NOTE — PROGRESS NOTE ADULT - PROBLEM SELECTOR PLAN 1
-Based on fever, tachycardia and hypotension. Now admitted to MICU.   -UA positive for nitrites, leuk esterase, likely urosepsis    -Urine and Blood cultures+   -WBC 8.7  -Lactate <2  -RVP negative, COVID negative   -No uterine tenderness, +CVA tenderness   -s/p Ceftriaxone q24h, now on Zosyn. Overnight tmax 103.3.  -Strict I's and O's   -Tylenol 975mg q6h PRN for fever. -Based on fever, tachycardia and hypotension. Now admitted to MICU.   -UA positive for nitrites, leuk esterase, likely urosepsis    -Urine and Blood cultures+ for ecoli.   -RVP negative, COVID negative   -No uterine tenderness, +CVA tenderness   -on Zosyn. Overnight tmax 100.9.  -Strict I's and O's   -Tylenol 975mg q6h PRN for fever.  - s/p MRI- no renal stones.   - ID following

## 2023-10-05 NOTE — PROGRESS NOTE ADULT - ASSESSMENT
23y  at 16w6d GA by first trimester sono who presents to ED for . Patient denies vaginal bleeding, contractions and leakage of fluid. She endorses good fetal movement. Denies fevers, chills, nausea and vomiting. No other complaints at this time. PT Frisian SPEAKING IN US ABOUT 6  MONTHS   BOBO: 3/13/24  LMP: 23  Prenatal course is significant for:  1. Di/di twin gestation   2. UTI s/p Keflex    AS ABOVE 16WEEK PREGNANT WITH FEVERS CHILLS  BLOOD CX ECOLI  BP IS LOW  PT IS ILL Appearing  IN ICU  JUST OFF    PRESSORS   MRI WIHT  PERINEPHRIC STRANDING AND HYDROURETERONEPHROSIS     CAN  CONTINUE IV ZOSYN  WILL DEESCALATE ONCE IMPROVED     WILL FOLLOW UP WITH FURTHER RECOMMENDATIONS

## 2023-10-06 LAB
ALBUMIN SERPL ELPH-MCNC: 2.9 G/DL — LOW (ref 3.3–5.2)
ALP SERPL-CCNC: 59 U/L — SIGNIFICANT CHANGE UP (ref 40–120)
ALT FLD-CCNC: 41 U/L — HIGH
ANION GAP SERPL CALC-SCNC: 12 MMOL/L — SIGNIFICANT CHANGE UP (ref 5–17)
AST SERPL-CCNC: 79 U/L — HIGH
BILIRUB SERPL-MCNC: 0.4 MG/DL — SIGNIFICANT CHANGE UP (ref 0.4–2)
BUN SERPL-MCNC: <3 MG/DL — LOW (ref 8–20)
CALCIUM SERPL-MCNC: 8.3 MG/DL — LOW (ref 8.4–10.5)
CHLORIDE SERPL-SCNC: 100 MMOL/L — SIGNIFICANT CHANGE UP (ref 96–108)
CO2 SERPL-SCNC: 22 MMOL/L — SIGNIFICANT CHANGE UP (ref 22–29)
CREAT SERPL-MCNC: 0.23 MG/DL — LOW (ref 0.5–1.3)
EGFR: 161 ML/MIN/1.73M2 — SIGNIFICANT CHANGE UP
GLUCOSE SERPL-MCNC: 81 MG/DL — SIGNIFICANT CHANGE UP (ref 70–99)
HCT VFR BLD CALC: 25.8 % — LOW (ref 34.5–45)
HGB BLD-MCNC: 8.9 G/DL — LOW (ref 11.5–15.5)
MAGNESIUM SERPL-MCNC: 1.6 MG/DL — SIGNIFICANT CHANGE UP (ref 1.6–2.6)
MCHC RBC-ENTMCNC: 30.4 PG — SIGNIFICANT CHANGE UP (ref 27–34)
MCHC RBC-ENTMCNC: 34.5 GM/DL — SIGNIFICANT CHANGE UP (ref 32–36)
MCV RBC AUTO: 88.1 FL — SIGNIFICANT CHANGE UP (ref 80–100)
PHOSPHATE SERPL-MCNC: 3.4 MG/DL — SIGNIFICANT CHANGE UP (ref 2.4–4.7)
PLATELET # BLD AUTO: 223 K/UL — SIGNIFICANT CHANGE UP (ref 150–400)
POTASSIUM SERPL-MCNC: 3.4 MMOL/L — LOW (ref 3.5–5.3)
POTASSIUM SERPL-SCNC: 3.4 MMOL/L — LOW (ref 3.5–5.3)
PROT SERPL-MCNC: 5.5 G/DL — LOW (ref 6.6–8.7)
RBC # BLD: 2.93 M/UL — LOW (ref 3.8–5.2)
RBC # FLD: 13.9 % — SIGNIFICANT CHANGE UP (ref 10.3–14.5)
SODIUM SERPL-SCNC: 134 MMOL/L — LOW (ref 135–145)
WBC # BLD: 5.61 K/UL — SIGNIFICANT CHANGE UP (ref 3.8–10.5)
WBC # FLD AUTO: 5.61 K/UL — SIGNIFICANT CHANGE UP (ref 3.8–10.5)

## 2023-10-06 PROCEDURE — 99232 SBSQ HOSP IP/OBS MODERATE 35: CPT

## 2023-10-06 RX ORDER — CEFTRIAXONE 500 MG/1
1000 INJECTION, POWDER, FOR SOLUTION INTRAMUSCULAR; INTRAVENOUS EVERY 24 HOURS
Refills: 0 | Status: DISCONTINUED | OUTPATIENT
Start: 2023-10-06 | End: 2023-10-06

## 2023-10-06 RX ORDER — MAGNESIUM SULFATE 500 MG/ML
2 VIAL (ML) INJECTION ONCE
Refills: 0 | Status: COMPLETED | OUTPATIENT
Start: 2023-10-06 | End: 2023-10-06

## 2023-10-06 RX ORDER — POTASSIUM CHLORIDE 20 MEQ
40 PACKET (EA) ORAL ONCE
Refills: 0 | Status: COMPLETED | OUTPATIENT
Start: 2023-10-06 | End: 2023-10-06

## 2023-10-06 RX ORDER — CEFTRIAXONE 500 MG/1
1000 INJECTION, POWDER, FOR SOLUTION INTRAMUSCULAR; INTRAVENOUS ONCE
Refills: 0 | Status: COMPLETED | OUTPATIENT
Start: 2023-10-06 | End: 2023-10-06

## 2023-10-06 RX ORDER — MIDODRINE HYDROCHLORIDE 2.5 MG/1
10 TABLET ORAL EVERY 8 HOURS
Refills: 0 | Status: DISCONTINUED | OUTPATIENT
Start: 2023-10-06 | End: 2023-10-06

## 2023-10-06 RX ORDER — CEFTRIAXONE 500 MG/1
2000 INJECTION, POWDER, FOR SOLUTION INTRAMUSCULAR; INTRAVENOUS EVERY 24 HOURS
Refills: 0 | Status: ACTIVE | OUTPATIENT
Start: 2023-10-07 | End: 2023-10-14

## 2023-10-06 RX ADMIN — Medication 1 TABLET(S): at 11:29

## 2023-10-06 RX ADMIN — MIDODRINE HYDROCHLORIDE 10 MILLIGRAM(S): 2.5 TABLET ORAL at 05:59

## 2023-10-06 RX ADMIN — Medication 100 MILLIGRAM(S): at 15:45

## 2023-10-06 RX ADMIN — CEFTRIAXONE 1000 MILLIGRAM(S): 500 INJECTION, POWDER, FOR SOLUTION INTRAMUSCULAR; INTRAVENOUS at 15:45

## 2023-10-06 RX ADMIN — CEFTRIAXONE 1000 MILLIGRAM(S): 500 INJECTION, POWDER, FOR SOLUTION INTRAMUSCULAR; INTRAVENOUS at 11:29

## 2023-10-06 RX ADMIN — PIPERACILLIN AND TAZOBACTAM 25 GRAM(S): 4; .5 INJECTION, POWDER, LYOPHILIZED, FOR SOLUTION INTRAVENOUS at 05:59

## 2023-10-06 RX ADMIN — Medication 325 MILLIGRAM(S): at 11:29

## 2023-10-06 RX ADMIN — Medication 25 GRAM(S): at 11:29

## 2023-10-06 RX ADMIN — Medication 100 MILLIGRAM(S): at 05:59

## 2023-10-06 RX ADMIN — Medication 40 MILLIEQUIVALENT(S): at 11:29

## 2023-10-06 RX ADMIN — Medication 100 MILLIGRAM(S): at 22:24

## 2023-10-06 NOTE — PROGRESS NOTE ADULT - SUBJECTIVE AND OBJECTIVE BOX
INFECTIOUS DISEASES AND INTERNAL MEDICINE at [place]  =======================================================  Arnav Morales MD  Diplomates American Board of Internal Medicine and Infectious Diseases  Telephone [phone]  Fax            [phone]  =======================================================    ASHISH YANES [id number]    Follow up: ECOLI BACTEREMIA    Allergies:  No Known Allergies      Medications:  acetaminophen     Tablet .. 975 milliGRAM(s) Oral every 6 hours PRN  chlorhexidine 2% Cloths 1 Application(s) Topical <User Schedule>  ferrous    sulfate 325 milliGRAM(s) Oral daily  influenza   Vaccine 0.5 milliLiter(s) IntraMuscular once  norepinephrine Infusion 0.05 MICROgram(s)/kG/Min IV Continuous <Continuous>  phenazopyridine 100 milliGRAM(s) Oral every 8 hours  piperacillin/tazobactam IVPB.. 3.375 Gram(s) IV Intermittent every 8 hours  prenatal multivitamin 1 Tablet(s) Oral daily  senna 2 Tablet(s) Oral at bedtime    SOCIAL       FAMILY   FAMILY HISTORY:    REVIEW OF SYSTEMS:  CONSTITUTIONAL:  No Fever or chills  HEENT:   No diplopia or blurred vision.  No earache, sore throat or runny nose.  CARDIOVASCULAR:  No pressure, squeezing, strangling, tightness, heaviness or aching about the chest, neck, axilla or epigastrium.  RESPIRATORY:  No cough, shortness of breath, PND or orthopnea.  GASTROINTESTINAL:  No nausea, vomiting or diarrhea.  GENITOURINARY:  No dysuria, frequency or urgency. No Blood in urine  MUSCULOSKELETAL:   moves all joints  SKIN:  No change in skin, hair or nails.  NEUROLOGIC:  No paresthesias, fasciculations, seizures or weakness.  PSYCHIATRIC:  No disorder of thought or mood.  ENDOCRINE:  No heat or cold intolerance, polyuria or polydipsia.  HEMATOLOGICAL:  No easy bruising or bleeding.            Physical Exam:  I ICU Vital Signs Last 24 Hrs  T(C): 36.6 (06 Oct 2023 11:05), Max: 36.9 (05 Oct 2023 23:17)  T(F): 97.9 (06 Oct 2023 11:05), Max: 98.4 (05 Oct 2023 23:17)  HR: 86 (06 Oct 2023 13:00) (73 - 123)  BP: 90/54 (06 Oct 2023 13:00) (70/40 - 110/61)  BP(mean): 64 (06 Oct 2023 13:00) (50 - 82)  ABP: --  ABP(mean): --  RR: 15 (06 Oct 2023 13:00) (0 - 25)  SpO2: 99% (06 Oct 2023 13:00) (96% - 100%)    O2 Parameters below as of 06 Oct 2023 12:00  Patient On (Oxygen Delivery Method): room air                  GEN: NAD,   HEENT: normocephalic and atraumatic. EOMI. LAKISHA.    NECK: Supple. No carotid bruits.  No lymphadenopathy or thyromegaly.  LUNGS: Clear to auscultation.  HEART: Regular rate and rhythm without murmur.  ABDOMEN: Soft, nontender, and nondistended.  Positive bowel sounds.    : No CVA tenderness  EXTREMITIES: Without any cyanosis, clubbing, rash, lesions or edema.  MSK: no joint swelling  NEUROLOGIC: Cranial nerves II through XII are grossly intact.  PSYCHIATRIC: Appropriate affect .  SKIN: No ulceration or induration present.        Labs:  Vitals:  ============  T(F): 98.1    =======================================================  Current Antibiotics:  piperacillin/tazobactam IVPB.. 3.375 Gram(s) IV Intermittent every 8 hours    Other medications:  chlorhexidine 2% Cloths 1 Application(s) Topical <User Schedule>  ferrous    sulfate 325 milliGRAM(s) Oral daily  influenza   Vaccine 0.5 milliLiter(s) IntraMuscular once  norepinephrine Infusion 0.05 MICROgram(s)/kG/Min IV Continuous <Continuous>  phenazopyridine 100 milliGRAM(s) Oral every 8 hours  prenatal multivitamin 1 Tablet(s) Oral daily  senna 2 Tablet(s) Oral at bedtime      =======================================================  Labs:                                           8.9    5.61  )-----------( 223      ( 06 Oct 2023 03:07 )             25.8   10-06    134<L>  |  100  |  <3.0<L>  ----------------------------<  81  3.4<L>   |  22.0  |  0.23<L>    Ca    8.3<L>      06 Oct 2023 03:07  Phos  3.4     10-06  Mg     1.6     10-06    TPro  5.5<L>  /  Alb  2.9<L>  /  TBili  0.4  /  DBili  x   /  AST  79<H>  /  ALT  41<H>  /  AlkPhos  59  10-06      Culture - Urine (collected 10-02-23 @ 22:25)  Source: Clean Catch Clean Catch (Midstream)    Culture - Blood (collected 10-02-23 @ 20:08)  Source: .Blood Blood-Peripheral  Gram Stain (10-03-23 @ 13:14):    Growth in aerobic bottle: Gram Negative Rods  Final Report (10-05-23 @ 10:55):    Growth in aerobic bottle: Escherichia coli    Direct identification is available within approximately 3-5    hours either by Blood Panel Multiplexed PCR or Direct    MALDI-TOF. Details: [email or web address]  Organism: Blood Culture PCR  Escherichia coli (10-05-23 @ 10:55)  Organism: Escherichia coli (10-05-23 @ 10:55)    Sensitivities:      Method Type: HILARIA      -  Amikacin: S <=16      -  Ampicillin: S <=8 These ampicillin results predict results for amoxicillin      -  Ampicillin/Sulbactam: S <=4/2      -  Aztreonam: S <=4      -  Cefazolin: S <=2      -  Cefepime: S <=2      -  Cefoxitin: S <=8      -  Ceftriaxone: S <=1      -  Ciprofloxacin: S <=0.25      -  Ertapenem: S <=0.5      -  Gentamicin: S <=2      -  Imipenem: S <=1      -  Levofloxacin: S <=0.5      -  Meropenem: S <=1      -  Piperacillin/Tazobactam: S <=8      -  Tobramycin: S <=2      -  Trimethoprim/Sulfamethoxazole: S <=0.5/9.5  Organism: Blood Culture PCR (10-05-23 @ 10:55)    Sensitivities:      Method Type: PCR      -  Escherichia coli: Detec    Culture - Blood (collected 10-02-23 @ 20:00)  Source: .Blood Blood-Peripheral      Creatinine: 0.30 mg/dL (10-05-23 @ 16:20)  Creatinine: 0.33 mg/dL (10-05-23 @ 03:10)  Creatinine: 0.26 mg/dL (10-04-23 @ 17:45)  Creatinine: 0.30 mg/dL (10-04-23 @ 03:57)  Creatinine: 0.39 mg/dL (10-03-23 @ 17:45)  Creatinine: 0.41 mg/dL (10-03-23 @ 15:40)  Creatinine: 0.37 mg/dL (10-03-23 @ 07:14)  Creatinine: 0.38 mg/dL (10-02-23 @ 20:20)        Ferritin: 57 ng/mL (10-03-23 @ 07:14)      WBC Count: 5.26 K/uL (10-05-23 @ 16:20)  WBC Count: 8.18 K/uL (10-05-23 @ 03:10)  WBC Count: 9.54 K/uL (10-04-23 @ 03:57)  WBC Count: 7.38 K/uL (10-03-23 @ 17:45)  WBC Count: 7.97 K/uL (10-03-23 @ 07:14)  WBC Count: 8.70 K/uL (10-02-23 @ 20:20)    SARS-CoV-2: NotDetec (10-02-23 @ 20:50)      Alkaline Phosphatase: 55 U/L (10-05-23 @ 03:10)  Alkaline Phosphatase: 56 U/L (10-04-23 @ 17:45)  Alkaline Phosphatase: 56 U/L (10-02-23 @ 20:20)  Alanine Aminotransferase (ALT/SGPT): 22 U/L (10-05-23 @ 03:10)  Alanine Aminotransferase (ALT/SGPT): 15 U/L (10-04-23 @ 17:45)  Alanine Aminotransferase (ALT/SGPT): 9 U/L (10-02-23 @ 20:20)  Aspartate Aminotransferase (AST/SGOT): 40 U/L (10-05-23 @ 03:10)  Aspartate Aminotransferase (AST/SGOT): 25 U/L (10-04-23 @ 17:45)  Aspartate Aminotransferase (AST/SGOT): 15 U/L (10-02-23 @ 20:20)  Bilirubin Total: 0.5 mg/dL (10-05-23 @ 03:10)  Bilirubin Total: 0.5 mg/dL (10-04-23 @ 17:45)  Bilirubin Total: 0.6 mg/dL (10-02-23 @ 20:20)  Bilirubin Direct: 0.2 mg/dL (10-04-23 @ 17:45)

## 2023-10-06 NOTE — PROVIDER CONTACT NOTE (MEDICATION) - ACTION/TREATMENT ORDERED:
Verified safety of medication Midodrine with pregnancy with BINDU Valente and Dr. Lr and said  ok to give.

## 2023-10-06 NOTE — CHART NOTE - NSCHARTNOTEFT_GEN_A_CORE
MICU DOWN GRADE NOTE      Patient is a 23y old  Female who presents with a chief complaint of Tubulointerstitial nephritis     (05 Oct 2023 13:40)      HPI:  23y F no PMHx from [facility], currently  at 17weeks GA by first trimester claribel who presents to ED on 10/3 for abdominal pain, nausea, vomiting, fevers, chills since 10/1. Patient denied vaginal bleeding, contractions and leakage of fluid. Was admitted to [facility] on 10/3, placed on Rocephin, BCx and UCx were positive for E. coli. Condition worsened 10/4, abx changed to Zosyn, developed septic shock 2/2 urosepsis, placed on pressors. Patient was upgraded to MICU on 10/4, on      INTERVAL HPI/OVERNIGHT EVENTS:        REVIEW OF SYSTEMS:  CONSTITUTIONAL: No fever, chills  HEENT:  No blurry vision No sinus or throat pain  NECK: No pain or stiffness  RESPIRATORY: No cough, wheezing, chills or hemoptysis; No shortness of breath  CARDIOVASCULAR: No chest pain, palpitations  GASTROINTESTINAL: No abdominal pain. No nausea, vomiting, or diarrhea  GENITOURINARY: No dysuria  NEUROLOGICAL: No HA, No focal weakness  SKIN: No itching, burning, rashes, or lesions   MUSCULOSKELETAL: No joint pain or swelling; No muscle, back, or extremity pain    MEDICATIONS:  acetaminophen     Tablet .. 975 milliGRAM(s) Oral every 6 hours PRN  cefTRIAXone Injectable. 1000 milliGRAM(s) IV Push every 24 hours  chlorhexidine 2% Cloths 1 Application(s) Topical <User Schedule>  ferrous    sulfate 325 milliGRAM(s) Oral daily  influenza   Vaccine 0.5 milliLiter(s) IntraMuscular once  norepinephrine Infusion 0.05 MICROgram(s)/kG/Min IV Continuous <Continuous>  phenazopyridine 100 milliGRAM(s) Oral every 8 hours  prenatal multivitamin 1 Tablet(s) Oral daily  senna 2 Tablet(s) Oral at bedtime      T(C): 36.6 (10-06-23 @ 07:05), Max: 36.9 (10-05-23 @ 23:17)  HR: 85 (10-06-23 @ 11:00) (73 - 125)  BP: 87/57 (10-06-23 @ 11:00) (70/40 - 110/61)  RR: 20 (10-06-23 @ 11:00) (0 - 25)  SpO2: 99% (10-06-23 @ 11:00) (96% - 100%)  Wt(kg): --Vital Signs Last 24 Hrs  T(C): 36.6 (06 Oct 2023 07:05), Max: 36.9 (05 Oct 2023 23:17)  T(F): 97.8 (06 Oct 2023 07:05), Max: 98.4 (05 Oct 2023 23:17)  HR: 85 (06 Oct 2023 11:00) (73 - 125)  BP: 87/57 (06 Oct 2023 11:00) (70/40 - 110/61)  BP(mean): 67 (06 Oct 2023 11:) (50 - 99)  RR: 20 (06 Oct 2023 11:00) (0 - 25)  SpO2: 99% (06 Oct 2023 11:00) (96% - 100%)    Parameters below as of 06 Oct 2023 11:00  Patient On (Oxygen Delivery Method): room air        PHYSICAL EXAM:  GENERAL: NAD, well-groomed, well-developed  HEAD:  Atraumatic, Normocephalic  EYES: EOMI, PERRLA, conjunctiva and sclera clear  ENMT:  Moist mucous membranes  NECK: Supple, No JVD,  CHEST/LUNG: Clear to auscultation bilaterally; No rales, rhonchi, wheezing, or rubs  HEART: Regular rate and rhythm; No murmurs, rubs, or gallops  ABDOMEN: Soft, Nontender, Nondistended; Bowel sounds present  NEURO: Alert & Oriented X3  EXTREMITIES: No LE edema, no calf tenderness  LYMPH: No lymphadenopathy noted  SKIN: No rashes or lesions    Consultant(s) Notes Reviewed:  [x ] YES  [ ] NO  Care Discussed with Consultants/Other Providers [ x] YES  [ ] NO    LABS:                        8.9    5.61  )-----------( 223      ( 06 Oct 2023 03:07 )             25.8     10-06    134<L>  |  100  |  <3.0<L>  ----------------------------<  81  3.4<L>   |  22.0  |  0.23<L>    Ca    8.3<L>      06 Oct 2023 03:07  Phos  3.4     10-  Mg     1.6     10    TPro  5.5<L>  /  Alb  2.9<L>  /  TBili  0.4  /  DBili  x   /  AST  79<H>  /  ALT  41<H>  /  AlkPhos  59  10-      Urinalysis Basic - ( 06 Oct 2023 03:07 )    Color: x / Appearance: x / SG: x / pH: x  Gluc: 81 mg/dL / Ketone: x  / Bili: x / Urobili: x   Blood: x / Protein: x / Nitrite: x   Leuk Esterase: x / RBC: x / WBC x   Sq Epi: x / Non Sq Epi: x / Bacteria: x      CAPILLARY BLOOD GLUCOSE            Urinalysis Basic - ( 06 Oct 2023 03:07 )    Color: x / Appearance: x / SG: x / pH: x  Gluc: 81 mg/dL / Ketone: x  / Bili: x / Urobili: x   Blood: x / Protein: x / Nitrite: x   Leuk Esterase: x / RBC: x / WBC x   Sq Epi: x / Non Sq Epi: x / Bacteria: x        RADIOLOGY & ADDITIONAL TESTS:    Imaging Personally Reviewed:  [x ] YES  [ ] NO MICU DOWN GRADE NOTE      Patient is a 23y old  Female who presents with a chief complaint of Tubulointerstitial nephritis     (05 Oct 2023 13:40)      HPI:  23y F no PMHx from [facility], currently  at 17weeks GA by first trimester claribel who presents to ED on 10/3 for abdominal pain, nausea, vomiting, fevers, chills since 10/1. Patient denied vaginal bleeding, contractions and leakage of fluid. Was admitted to [facility] on 10/3, placed on Rocephin, BCx and UCx were positive for E. coli. Condition worsened 10/4, abx changed to Zosyn, developed septic shock 2/2 urosepsis, placed on pressors. Patient was upgraded to MICU on 10/4. Renal US showed b/l hydronephrosis. MR abdomen performed, showed no signs of obstruction, possible obstruction due to gravid uterus. Urology was consulted, no surgical intervention required.     OVERNIGHT EVENTS:  Pressors titrated down. Zosyn discontinued and changed to Ceftriaxone due to sensitivities. Meadows removed, trial of void passed. Midodrine was given overnight, and should not have been given, discontinued after one dose. Placed back on norepinephrine.     Subjective:   Talked with patient this morning, no longer experiencing nausea, vomiting, no abdominal tenderness, able to walk to commode to urinate, eating/drinking well. MAP range changed to 55-60, pressors titrated down, no longer requiring them. Will discuss dose of Midodrine with patient today.     REVIEW OF SYSTEMS:  CONSTITUTIONAL: No fever, chills  HEENT:  No blurry vision No sinus or throat pain  NECK: No pain or stiffness  RESPIRATORY: No cough, wheezing, chills or hemoptysis; No shortness of breath  CARDIOVASCULAR: No chest pain, palpitations  GASTROINTESTINAL: No abdominal pain. No nausea, vomiting, or diarrhea  GENITOURINARY: No dysuria  NEUROLOGICAL: No HA, No focal weakness  SKIN: No itching, burning, rashes, or lesions   MUSCULOSKELETAL: No joint pain or swelling; No muscle, back, or extremity pain    MEDICATIONS:  acetaminophen     Tablet .. 975 milliGRAM(s) Oral every 6 hours PRN  cefTRIAXone Injectable. 1000 milliGRAM(s) IV Push every 24 hours  chlorhexidine 2% Cloths 1 Application(s) Topical <User Schedule>  ferrous    sulfate 325 milliGRAM(s) Oral daily  influenza   Vaccine 0.5 milliLiter(s) IntraMuscular once  norepinephrine Infusion 0.05 MICROgram(s)/kG/Min IV Continuous <Continuous>  phenazopyridine 100 milliGRAM(s) Oral every 8 hours  prenatal multivitamin 1 Tablet(s) Oral daily  senna 2 Tablet(s) Oral at bedtime      T(C): 36.6 (10-06-23 @ 07:05), Max: 36.9 (10-05-23 @ 23:17)  HR: 85 (10-06-23 @ 11:00) (73 - 125)  BP: 87/57 (10-06-23 @ 11:00) (70/40 - 110/61)  RR: 20 (10-06-23 @ 11:00) (0 - 25)  SpO2: 99% (10-06-23 @ 11:00) (96% - 100%)  Wt(kg): --Vital Signs Last 24 Hrs  T(C): 36.6 (06 Oct 2023 07:05), Max: 36.9 (05 Oct 2023 23:17)  T(F): 97.8 (06 Oct 2023 07:05), Max: 98.4 (05 Oct 2023 23:17)  HR: 85 (06 Oct 2023 11:00) (73 - 125)  BP: 87/57 (06 Oct 2023 11:00) (70/40 - 110/61)  BP(mean): 67 (06 Oct 2023 11:00) (50 - 99)  RR: 20 (06 Oct 2023 11:00) (0 - 25)  SpO2: 99% (06 Oct 2023 11:00) (96% - 100%)    Parameters below as of 06 Oct 2023 11:00  Patient On (Oxygen Delivery Method): room air        PHYSICAL EXAM:  GENERAL: NAD, well-groomed, well-developed  HEAD:  Atraumatic, Normocephalic  EYES: EOMI, PERRLA, conjunctiva and sclera clear  ENMT:  Moist mucous membranes  NECK: Supple, No JVD,  CHEST/LUNG: Clear to auscultation bilaterally; No rales, rhonchi, wheezing, or rubs  HEART: Regular rate and rhythm; No murmurs, rubs, or gallops  ABDOMEN: Soft, Nontender, gravid abdomen; Bowel sounds present  NEURO: Alert & Oriented X3  EXTREMITIES: No LE edema, no calf tenderness  LYMPH: No lymphadenopathy noted  SKIN: No rashes or lesions    Consultant(s) Notes Reviewed:  [x ] YES  [ ] NO  Care Discussed with Consultants/Other Providers [ x] YES  [ ] NO    LABS:                        8.9    5.61  )-----------( 223      ( 06 Oct 2023 03:07 )             25.8     10-06    134<L>  |  100  |  <3.0<L>  ----------------------------<  81  3.4<L>   |  22.0  |  0.23<L>    Ca    8.3<L>      06 Oct 2023 03:07  Phos  3.4     10-  Mg     1.6     10-    TPro  5.5<L>  /  Alb  2.9<L>  /  TBili  0.4  /  DBili  x   /  AST  79<H>  /  ALT  41<H>  /  AlkPhos  59  10-06      Urinalysis Basic - ( 06 Oct 2023 03:07 )    Color: x / Appearance: x / SG: x / pH: x  Gluc: 81 mg/dL / Ketone: x  / Bili: x / Urobili: x   Blood: x / Protein: x / Nitrite: x   Leuk Esterase: x / RBC: x / WBC x   Sq Epi: x / Non Sq Epi: x / Bacteria: x      CAPILLARY BLOOD GLUCOSE      Urinalysis Basic - ( 06 Oct 2023 03:07 )    Color: x / Appearance: x / SG: x / pH: x  Gluc: 81 mg/dL / Ketone: x  / Bili: x / Urobili: x   Blood: x / Protein: x / Nitrite: x   Leuk Esterase: x / RBC: x / WBC x   Sq Epi: x / Non Sq Epi: x / Bacteria: x        RADIOLOGY & ADDITIONAL TESTS:    Imaging Personally Reviewed:  [x ] YES  [ ] NO    Assessment:   23y F no PMHx from [facility], currently  at 17weeks GA by first trimester claribel who presents to ED on 10/3 for urosepsis. Admitted to [facility]. Condition worsened, placed on pressors. Upgraded to MICU on 10/4. No signs of obstruction. No surgical intervention required. No longer requiring pressors, stable for downgrade back to [facility].     Plan:  Neuro: pain control with PRN Tylenol, AZO, Dilaudid  Cardiac: no longer requiring pressors, maintain MAP >60   Pulm: saturating well on room air  GI: regular BM  : c/w ceftriaxone   OB: admit to [facility]   DVT: none  PPI: none

## 2023-10-06 NOTE — PROGRESS NOTE ADULT - PROBLEM SELECTOR PLAN 1
-Based on fever, tachycardia and hypotension. Now admitted to MICU.   -UA positive for nitrites, leuk esterase, likely urosepsis    -Urine and Blood cultures+ for ecoli.   -RVP negative, COVID negative   -No uterine tenderness, +CVA tenderness   -on Zosyn. Overnight tmax 100.9 10/4. Afebrile since   -Strict I's and O's   -Tylenol 975mg q6h PRN for fever.  - s/p MRI- no renal stones.   - ID following.

## 2023-10-06 NOTE — PROGRESS NOTE ADULT - ASSESSMENT
23y  at 17.2wks GA by first trimester sonogram. She is hospitalized for urosepsis. Currently on Zosyn.  Admitted to MICU.

## 2023-10-06 NOTE — PROGRESS NOTE ADULT - ASSESSMENT
23y  at 16w6d GA by first trimester sono who presents to ED for . Patient denies vaginal bleeding, contractions and leakage of fluid. She endorses good fetal movement. Denies fevers, chills, nausea and vomiting. No other complaints at this time. PT Armenian SPEAKING IN US ABOUT 6  MONTHS   BOBO: 3/13/24  LMP: 23  Prenatal course is significant for:  1. Di/di twin gestation   2. UTI s/p Keflex    AS ABOVE 16WEEK PREGNANT WITH FEVERS CHILLS  BLOOD CX ECOLI  BP IS LOW  PT IS CLINICALLY IMPROVED AND   IN ICU  J  OFF    PRESSORS   MRI WIHT  PERINEPHRIC STRANDING AND HYDROURETERONEPHROSIS     CAN  DEESCALATE TO ROCEPHIN BUT WOUD INCREASE TO  2GM DAILY   WILL FOLLOW UP WITH FURTHER RECOMMENDATIONS

## 2023-10-06 NOTE — PROGRESS NOTE ADULT - SUBJECTIVE AND OBJECTIVE BOX
ASHISH YANES  [facility] 3EST [id number] 01  23y  at 17.2wks GA by first trimester sonogram. She is hospitalized for urosepsis. Currently on Zosyn.  Admitted to MICU.    Last 24 hours:   Now off pressor support. Afebrile last 24 hours     Pt seen and examined at bedside. No complaints this am, reports that she is feeling better. No obstetrical complaints.     Vital Signs:  Vital Signs Last 24 Hrs  T(C): 36.8 (06 Oct 2023 03:08), Max: 36.9 (05 Oct 2023 23:17)  T(F): 98.3 (06 Oct 2023 03:08), Max: 98.4 (05 Oct 2023 23:17)  HR: 93 (06 Oct 2023 06:00) (73 - 125)  BP: 89/66 (06 Oct 2023 06:00) (70/40 - 110/61)  BP(mean): 74 (06 Oct 2023 06:00) (50 - 99)  RR: 18 (06 Oct 2023 06:00) (0 - 29)  SpO2: 100% (06 Oct 2023 06:00) (96% - 100%)    Parameters below as of 05 Oct 2023 23:00  Patient On (Oxygen Delivery Method): room air            Physical Exam:  General: Adult female in NAD  Lungs: Normal respiratory effort   Abdomen: soft, non-tender, gravid uterus  Ext: No cyanosis, edema or calf tenderness  Skin: No rashes or lesions on exposed skin    Labs:                          8.9    5.61  )-----------( 223      ( 06 Oct 2023 03:07 )             25.8     10-    134<L>  |  100  |  <3.0<L>  ----------------------------<  81  3.4<L>   |  22.0  |  0.23<L>    Ca    8.3<L>      06 Oct 2023 03:07  Phos  3.4     10-  Mg     1.6     10-    TPro  5.5<L>  /  Alb  2.9<L>  /  TBili  0.4  /  DBili  x   /  AST  79<H>  /  ALT  41<H>  /  AlkPhos  59  10-          Culture - Urine (collected 10-02-23 @ 22:25)  Source: Clean Catch Clean Catch (Midstream)  Final Report (10-05-23 @ 17:40):    10,000 - 49,000 CFU/mL Escherichia coli  Organism: Escherichia coli (10-05-23 @ 17:40)  Organism: Escherichia coli (10-05-23 @ 17:40)      Method Type: HILARIA      -  Amikacin: S <=16      -  Amoxicillin/Clavulanic Acid: S <=8/4      -  Ampicillin: S <=8 These ampicillin results predict results for amoxicillin      -  Ampicillin/Sulbactam: S <=4/2      -  Aztreonam: S <=4      -  Cefazolin: S <=2 For uncomplicated UTI with K. pneumoniae, E. coli, or P. mirablis: HILARIA <=16 is sensitive and HILARIA >=32 is resistant. This also predicts results for oral agents cefaclor, cefdinir, cefpodoxime, cefprozil, cefuroxime axetil, cephalexin and locarbef for uncomplicated UTI. Note that some isolates may be susceptible to these agents while testing resistant to cefazolin.      -  Cefepime: S <=2      -  Cefoxitin: S <=8      -  Ceftriaxone: S <=1      -  Cefuroxime: S <=4      -  Ciprofloxacin: S <=0.25      -  Ertapenem: S <=0.5      -  Gentamicin: S <=2      -  Imipenem: S <=1      -  Levofloxacin: S <=0.5      -  Meropenem: S <=1      -  Nitrofurantoin: S <=32 Should not be used to treat pyelonephritis      -  Piperacillin/Tazobactam: S <=8      -  Tobramycin: S <=2      -  Trimethoprim/Sulfamethoxazole: S <=0.5/9.5    Culture - Blood (collected 10-02-23 @ 20:08)  Source: .Blood Blood-Peripheral  Gram Stain (10-03-23 @ 13:14):    Growth in aerobic bottle: Gram Negative Rods  Final Report (10-05-23 @ 10:55):    Growth in aerobic bottle: Escherichia coli    Direct identification is available within approximately 3-5    hours either by Blood Panel Multiplexed PCR or Direct    MALDI-TOF. Details: [email or web address]  Organism: Blood Culture PCR  Escherichia coli (10-05-23 @ 10:55)  Organism: Escherichia coli (10-05-23 @ 10:55)      Method Type: HILARIA      -  Amikacin: S <=16      -  Ampicillin: S <=8 These ampicillin results predict results for amoxicillin      -  Ampicillin/Sulbactam: S <=4/2      -  Aztreonam: S <=4      -  Cefazolin: S <=2      -  Cefepime: S <=2      -  Cefoxitin: S <=8      -  Ceftriaxone: S <=1      -  Ciprofloxacin: S <=0.25      -  Ertapenem: S <=0.5      -  Gentamicin: S <=2      -  Imipenem: S <=1      -  Levofloxacin: S <=0.5      -  Meropenem: S <=1      -  Piperacillin/Tazobactam: S <=8      -  Tobramycin: S <=2      -  Trimethoprim/Sulfamethoxazole: S <=0.5/9.5  Organism: Blood Culture PCR (10-05-23 @ 10:55)      Method Type: PCR      -  Escherichia coli: Detec    Culture - Blood (collected 10-02-23 @ 20:00)  Source: .Blood Blood-Peripheral  Preliminary Report (10-06-23 @ 02:01):    No growth at 72 Hours        Radiology:    MEDICATIONS  (STANDING):  cefTRIAXone Injectable. 1000 milliGRAM(s) IV Push every 24 hours  chlorhexidine 2% Cloths 1 Application(s) Topical <User Schedule>  ferrous    sulfate 325 milliGRAM(s) Oral daily  influenza   Vaccine 0.5 milliLiter(s) IntraMuscular once  magnesium sulfate  IVPB 2 Gram(s) IV Intermittent once  midodrine 10 milliGRAM(s) Oral every 8 hours  norepinephrine Infusion 0.05 MICROgram(s)/kG/Min (6.89 mL/Hr) IV Continuous <Continuous>  phenazopyridine 100 milliGRAM(s) Oral every 8 hours  potassium chloride    Tablet ER 40 milliEquivalent(s) Oral once  prenatal multivitamin 1 Tablet(s) Oral daily  senna 2 Tablet(s) Oral at bedtime    MEDICATIONS  (PRN):  acetaminophen     Tablet .. 975 milliGRAM(s) Oral every 6 hours PRN Temp greater or equal to 38C (100.4F), Mild Pain (1 - 3)

## 2023-10-06 NOTE — CHART NOTE - NSCHARTNOTEFT_GEN_A_CORE
: Matty, #[id number]  Educated the patient on her hospital stay and transfer back to [facility]. Also, Midodrine dose, one given, overnight, discontinued quickly. Told the patient that this medication was previously used in pregnant patients with low blood pressure. However, it was discontinued due to side effects of nausea, vomiting, dizziness. With further research it was found to effect the fetus, by decreasing blood flow, and lowering the survival rate. The patient understood, and had no further questions.

## 2023-10-07 LAB
ALBUMIN SERPL ELPH-MCNC: 2.7 G/DL — LOW (ref 3.3–5.2)
ALP SERPL-CCNC: 69 U/L — SIGNIFICANT CHANGE UP (ref 40–120)
ALT FLD-CCNC: 57 U/L — HIGH
ANION GAP SERPL CALC-SCNC: 13 MMOL/L — SIGNIFICANT CHANGE UP (ref 5–17)
AST SERPL-CCNC: 78 U/L — HIGH
BASOPHILS # BLD AUTO: 0 K/UL — SIGNIFICANT CHANGE UP (ref 0–0.2)
BASOPHILS NFR BLD AUTO: 0 % — SIGNIFICANT CHANGE UP (ref 0–2)
BILIRUB SERPL-MCNC: 0.2 MG/DL — LOW (ref 0.4–2)
BUN SERPL-MCNC: 4.2 MG/DL — LOW (ref 8–20)
CALCIUM SERPL-MCNC: 8.8 MG/DL — SIGNIFICANT CHANGE UP (ref 8.4–10.5)
CHLORIDE SERPL-SCNC: 101 MMOL/L — SIGNIFICANT CHANGE UP (ref 96–108)
CO2 SERPL-SCNC: 20 MMOL/L — LOW (ref 22–29)
CREAT SERPL-MCNC: 0.25 MG/DL — LOW (ref 0.5–1.3)
EGFR: 158 ML/MIN/1.73M2 — SIGNIFICANT CHANGE UP
EOSINOPHIL # BLD AUTO: 0.08 K/UL — SIGNIFICANT CHANGE UP (ref 0–0.5)
EOSINOPHIL NFR BLD AUTO: 2 % — SIGNIFICANT CHANGE UP (ref 0–6)
GLUCOSE SERPL-MCNC: 75 MG/DL — SIGNIFICANT CHANGE UP (ref 70–99)
HCT VFR BLD CALC: 26.8 % — LOW (ref 34.5–45)
HGB BLD-MCNC: 8.7 G/DL — LOW (ref 11.5–15.5)
IMM GRANULOCYTES NFR BLD AUTO: 0.2 % — SIGNIFICANT CHANGE UP (ref 0–0.9)
LYMPHOCYTES # BLD AUTO: 2.08 K/UL — SIGNIFICANT CHANGE UP (ref 1–3.3)
LYMPHOCYTES # BLD AUTO: 50.7 % — HIGH (ref 13–44)
MAGNESIUM SERPL-MCNC: 1.7 MG/DL — SIGNIFICANT CHANGE UP (ref 1.6–2.6)
MCHC RBC-ENTMCNC: 30.4 PG — SIGNIFICANT CHANGE UP (ref 27–34)
MCHC RBC-ENTMCNC: 32.5 GM/DL — SIGNIFICANT CHANGE UP (ref 32–36)
MCV RBC AUTO: 93.7 FL — SIGNIFICANT CHANGE UP (ref 80–100)
MONOCYTES # BLD AUTO: 0.27 K/UL — SIGNIFICANT CHANGE UP (ref 0–0.9)
MONOCYTES NFR BLD AUTO: 6.6 % — SIGNIFICANT CHANGE UP (ref 2–14)
NEUTROPHILS # BLD AUTO: 1.66 K/UL — LOW (ref 1.8–7.4)
NEUTROPHILS NFR BLD AUTO: 40.5 % — LOW (ref 43–77)
PHOSPHATE SERPL-MCNC: 4.7 MG/DL — SIGNIFICANT CHANGE UP (ref 2.4–4.7)
PLATELET # BLD AUTO: 221 K/UL — SIGNIFICANT CHANGE UP (ref 150–400)
POTASSIUM SERPL-MCNC: 3.1 MMOL/L — LOW (ref 3.5–5.3)
POTASSIUM SERPL-SCNC: 3.1 MMOL/L — LOW (ref 3.5–5.3)
PROT SERPL-MCNC: 6.1 G/DL — LOW (ref 6.6–8.7)
RBC # BLD: 2.86 M/UL — LOW (ref 3.8–5.2)
RBC # FLD: 13.9 % — SIGNIFICANT CHANGE UP (ref 10.3–14.5)
SODIUM SERPL-SCNC: 134 MMOL/L — LOW (ref 135–145)
WBC # BLD: 4.1 K/UL — SIGNIFICANT CHANGE UP (ref 3.8–10.5)
WBC # FLD AUTO: 4.1 K/UL — SIGNIFICANT CHANGE UP (ref 3.8–10.5)

## 2023-10-07 PROCEDURE — 99232 SBSQ HOSP IP/OBS MODERATE 35: CPT

## 2023-10-07 RX ADMIN — Medication 100 MILLIGRAM(S): at 12:37

## 2023-10-07 RX ADMIN — Medication 325 MILLIGRAM(S): at 12:38

## 2023-10-07 RX ADMIN — Medication 100 MILLIGRAM(S): at 05:19

## 2023-10-07 RX ADMIN — SENNA PLUS 2 TABLET(S): 8.6 TABLET ORAL at 21:25

## 2023-10-07 RX ADMIN — Medication 1 TABLET(S): at 12:37

## 2023-10-07 RX ADMIN — CEFTRIAXONE 2000 MILLIGRAM(S): 500 INJECTION, POWDER, FOR SOLUTION INTRAMUSCULAR; INTRAVENOUS at 12:38

## 2023-10-07 RX ADMIN — Medication 100 MILLIGRAM(S): at 21:25

## 2023-10-07 NOTE — PROGRESS NOTE ADULT - SUBJECTIVE AND OBJECTIVE BOX
ASHISH YANES  [facility] 3EST 3101 01    23y  at 17.2wks GA by first trimester sonogram. She is hospitalized for urosepsis. Currently on Zosyn.  Admitted to MICU.    Last 24 hours:   Pt seen and examined at bedside. No complaints this am, reports that she is feeling better. No obstetrical complaints.     ICU Vital Signs Last 24 Hrs  T(C): 36.7 (07 Oct 2023 08:24), Max: 36.7 (07 Oct 2023 05:00)  T(F): 98 (07 Oct 2023 08:24), Max: 98 (07 Oct 2023 05:00)  HR: 83 (07 Oct 2023 08:24) (81 - 101)  BP: 96/58 (07 Oct 2023 08:24) (90/64 - 101/66)  BP(mean): 72 (06 Oct 2023 23:51) (65 - 86)  ABP: --  ABP(mean): --  RR: 18 (07 Oct 2023 08:24) (14 - 18)  SpO2: 97% (07 Oct 2023 08:24) (96% - 100%)    O2 Parameters below as of 07 Oct 2023 08:24  Patient On (Oxygen Delivery Method): room air                  Physical Exam:  General: Adult female in NAD  Lungs: Normal respiratory effort   Abdomen: soft, non-tender, gravid uterus.   MSK: No CVA tenderness   Ext: No cyanosis, edema or calf tenderness  Skin: No rashes or lesions on exposed skin    Labs:                                     8.7    4.10  )-----------( 221      ( 07 Oct 2023 07:46 )             26.8     10-    134<L>  |  101  |  4.2<L>  ----------------------------<  75  3.1<L>   |  20.0<L>  |  0.25<L>    Ca    8.8      07 Oct 2023 07:46  Phos  4.7     10-07  Mg     1.7     10-07    TPro  6.1<L>  /  Alb  2.7<L>  /  TBili  0.2<L>  /  DBili  x   /  AST  78<H>  /  ALT  57<H>  /  AlkPhos  69  10-07      Culture - Urine (collected 10-02-23 @ 22:25)  Source: Clean Catch Clean Catch (Midstream)  Final Report (10-05-23 @ 17:40):    10,000 - 49,000 CFU/mL Escherichia coli  Organism: Escherichia coli (10-05-23 @ 17:40)  Organism: Escherichia coli (10-05-23 @ 17:40)      Method Type: HILARIA      -  Amikacin: S <=16      -  Amoxicillin/Clavulanic Acid: S <=8/4      -  Ampicillin: S <=8 These ampicillin results predict results for amoxicillin      -  Ampicillin/Sulbactam: S <=4/2      -  Aztreonam: S <=4      -  Cefazolin: S <=2 For uncomplicated UTI with K. pneumoniae, E. coli, or P. mirablis: HILARIA <=16 is sensitive and HILARIA >=32 is resistant. This also predicts results for oral agents cefaclor, cefdinir, cefpodoxime, cefprozil, cefuroxime axetil, cephalexin and locarbef for uncomplicated UTI. Note that some isolates may be susceptible to these agents while testing resistant to cefazolin.      -  Cefepime: S <=2      -  Cefoxitin: S <=8      -  Ceftriaxone: S <=1      -  Cefuroxime: S <=4      -  Ciprofloxacin: S <=0.25      -  Ertapenem: S <=0.5      -  Gentamicin: S <=2      -  Imipenem: S <=1      -  Levofloxacin: S <=0.5      -  Meropenem: S <=1      -  Nitrofurantoin: S <=32 Should not be used to treat pyelonephritis      -  Piperacillin/Tazobactam: S <=8      -  Tobramycin: S <=2      -  Trimethoprim/Sulfamethoxazole: S <=0.5/9.5    Culture - Blood (collected 10-02-23 @ 20:08)  Source: .Blood Blood-Peripheral  Gram Stain (10-03-23 @ 13:14):    Growth in aerobic bottle: Gram Negative Rods  Final Report (10-05-23 @ 10:55):    Growth in aerobic bottle: Escherichia coli    Direct identification is available within approximately 3-5    hours either by Blood Panel Multiplexed PCR or Direct    MALDI-TOF. Details: [email or web address]  Organism: Blood Culture PCR  Escherichia coli (10-05-23 @ 10:55)  Organism: Escherichia coli (10-05-23 @ 10:55)      Method Type: HILARIA      -  Amikacin: S <=16      -  Ampicillin: S <=8 These ampicillin results predict results for amoxicillin      -  Ampicillin/Sulbactam: S <=4/2      -  Aztreonam: S <=4      -  Cefazolin: S <=2      -  Cefepime: S <=2      -  Cefoxitin: S <=8      -  Ceftriaxone: S <=1      -  Ciprofloxacin: S <=0.25      -  Ertapenem: S <=0.5      -  Gentamicin: S <=2      -  Imipenem: S <=1      -  Levofloxacin: S <=0.5      -  Meropenem: S <=1      -  Piperacillin/Tazobactam: S <=8      -  Tobramycin: S <=2      -  Trimethoprim/Sulfamethoxazole: S <=0.5/9.5  Organism: Blood Culture PCR (10-05-23 @ 10:55)      Method Type: PCR      -  Escherichia coli: Detec    Culture - Blood (collected 10-02-23 @ 20:00)  Source: .Blood Blood-Peripheral  Preliminary Report (10-06-23 @ 02:01):    No growth at 72 Hours        Radiology:    MEDICATIONS  (STANDING):  cefTRIAXone Injectable. 1000 milliGRAM(s) IV Push every 24 hours  chlorhexidine 2% Cloths 1 Application(s) Topical <User Schedule>  ferrous    sulfate 325 milliGRAM(s) Oral daily  influenza   Vaccine 0.5 milliLiter(s) IntraMuscular once  magnesium sulfate  IVPB 2 Gram(s) IV Intermittent once  midodrine 10 milliGRAM(s) Oral every 8 hours  norepinephrine Infusion 0.05 MICROgram(s)/kG/Min (6.89 mL/Hr) IV Continuous <Continuous>  phenazopyridine 100 milliGRAM(s) Oral every 8 hours  potassium chloride    Tablet ER 40 milliEquivalent(s) Oral once  prenatal multivitamin 1 Tablet(s) Oral daily  senna 2 Tablet(s) Oral at bedtime    MEDICATIONS  (PRN):  acetaminophen     Tablet .. 975 milliGRAM(s) Oral every 6 hours PRN Temp greater or equal to 38C (100.4F), Mild Pain (1 - 3)

## 2023-10-07 NOTE — PROGRESS NOTE ADULT - ASSESSMENT
23y  at 17.2wks GA by first trimester sonogram. She is hospitalized for urosepsis. Currently on Zosyn.

## 2023-10-07 NOTE — PROGRESS NOTE ADULT - PROBLEM SELECTOR PLAN 1
-DCDA twin gestation at 17+ weeks admitted for management of urosepsis and bacteremia. downgraded from MICU, feeling better today, asymptomatic today. FHR x 2 assessed at bedside. Continue FH q shift.  Appreciate MICU, Nephrology, and ID recommendations.  -Urine and Blood cultures+ for ecoli.   -RVP negative, COVID negative   -No uterine tenderness,  No CVA tenderness   -Tylenol 975mg q6h PRN for fever, currently afebrile   - s/p MRI- no renal stones.   - ID following: pending their antibiotic recommendations for outpatient care

## 2023-10-08 LAB
ALBUMIN SERPL ELPH-MCNC: 2.9 G/DL — LOW (ref 3.3–5.2)
ALP SERPL-CCNC: 63 U/L — SIGNIFICANT CHANGE UP (ref 40–120)
ALT FLD-CCNC: 49 U/L — HIGH
ANION GAP SERPL CALC-SCNC: 13 MMOL/L — SIGNIFICANT CHANGE UP (ref 5–17)
APPEARANCE UR: SIGNIFICANT CHANGE UP
AST SERPL-CCNC: 45 U/L — HIGH
BACTERIA # UR AUTO: ABNORMAL
BILIRUB SERPL-MCNC: <0.2 MG/DL — LOW (ref 0.4–2)
BILIRUB UR-MCNC: ABNORMAL
BUN SERPL-MCNC: 7 MG/DL — LOW (ref 8–20)
CALCIUM SERPL-MCNC: 8.4 MG/DL — SIGNIFICANT CHANGE UP (ref 8.4–10.5)
CHLORIDE SERPL-SCNC: 99 MMOL/L — SIGNIFICANT CHANGE UP (ref 96–108)
CO2 SERPL-SCNC: 22 MMOL/L — SIGNIFICANT CHANGE UP (ref 22–29)
COLOR SPEC: ABNORMAL
CREAT SERPL-MCNC: 0.35 MG/DL — LOW (ref 0.5–1.3)
CULTURE RESULTS: SIGNIFICANT CHANGE UP
DIFF PNL FLD: NEGATIVE — SIGNIFICANT CHANGE UP
EGFR: 145 ML/MIN/1.73M2 — SIGNIFICANT CHANGE UP
EPI CELLS # UR: SIGNIFICANT CHANGE UP
GLUCOSE SERPL-MCNC: 85 MG/DL — SIGNIFICANT CHANGE UP (ref 70–99)
GLUCOSE UR QL: NEGATIVE — SIGNIFICANT CHANGE UP
HCT VFR BLD CALC: 27.8 % — LOW (ref 34.5–45)
HGB BLD-MCNC: 9 G/DL — LOW (ref 11.5–15.5)
KETONES UR-MCNC: NEGATIVE — SIGNIFICANT CHANGE UP
LEUKOCYTE ESTERASE UR-ACNC: NEGATIVE — SIGNIFICANT CHANGE UP
MCHC RBC-ENTMCNC: 29 PG — SIGNIFICANT CHANGE UP (ref 27–34)
MCHC RBC-ENTMCNC: 32.4 GM/DL — SIGNIFICANT CHANGE UP (ref 32–36)
MCV RBC AUTO: 89.7 FL — SIGNIFICANT CHANGE UP (ref 80–100)
NITRITE UR-MCNC: POSITIVE
PH UR: 5 — SIGNIFICANT CHANGE UP (ref 5–8)
PLATELET # BLD AUTO: 287 K/UL — SIGNIFICANT CHANGE UP (ref 150–400)
POTASSIUM SERPL-MCNC: 3.7 MMOL/L — SIGNIFICANT CHANGE UP (ref 3.5–5.3)
POTASSIUM SERPL-SCNC: 3.7 MMOL/L — SIGNIFICANT CHANGE UP (ref 3.5–5.3)
PROT SERPL-MCNC: 5.8 G/DL — LOW (ref 6.6–8.7)
PROT UR-MCNC: 30 MG/DL
RBC # BLD: 3.1 M/UL — LOW (ref 3.8–5.2)
RBC # FLD: 13.7 % — SIGNIFICANT CHANGE UP (ref 10.3–14.5)
RBC CASTS # UR COMP ASSIST: >50 /HPF (ref 0–4)
SODIUM SERPL-SCNC: 134 MMOL/L — LOW (ref 135–145)
SP GR SPEC: 1.02 — SIGNIFICANT CHANGE UP (ref 1.01–1.02)
SPECIMEN SOURCE: SIGNIFICANT CHANGE UP
UROBILINOGEN FLD QL: 4
WBC # BLD: 4.44 K/UL — SIGNIFICANT CHANGE UP (ref 3.8–10.5)
WBC # FLD AUTO: 4.44 K/UL — SIGNIFICANT CHANGE UP (ref 3.8–10.5)
WBC UR QL: SIGNIFICANT CHANGE UP /HPF (ref 0–5)

## 2023-10-08 RX ORDER — POTASSIUM CHLORIDE 20 MEQ
40 PACKET (EA) ORAL EVERY 4 HOURS
Refills: 0 | Status: COMPLETED | OUTPATIENT
Start: 2023-10-08 | End: 2023-10-08

## 2023-10-08 RX ADMIN — Medication 40 MILLIEQUIVALENT(S): at 19:26

## 2023-10-08 RX ADMIN — Medication 1 TABLET(S): at 12:03

## 2023-10-08 RX ADMIN — CEFTRIAXONE 2000 MILLIGRAM(S): 500 INJECTION, POWDER, FOR SOLUTION INTRAMUSCULAR; INTRAVENOUS at 12:03

## 2023-10-08 RX ADMIN — Medication 100 MILLIGRAM(S): at 22:46

## 2023-10-08 RX ADMIN — Medication 100 MILLIGRAM(S): at 05:12

## 2023-10-08 RX ADMIN — Medication 100 MILLIGRAM(S): at 12:03

## 2023-10-08 RX ADMIN — Medication 40 MILLIEQUIVALENT(S): at 22:45

## 2023-10-08 RX ADMIN — Medication 325 MILLIGRAM(S): at 12:04

## 2023-10-08 NOTE — PROGRESS NOTE ADULT - PROBLEM SELECTOR PLAN 2
-Pt will need outpatient follow up in approximately 1 week after discharge for cervical length and fetal anatomy scan.

## 2023-10-08 NOTE — PROGRESS NOTE ADULT - PROBLEM SELECTOR PLAN 1
-DCDA twin gestation at 17w4d admitted for management of urosepsis and bacteremia. downgraded from MICU, feeling better today, asymptomatic today. FHR x 2 assessed at bedside. Continue FH q shift.  Appreciate MICU, Nephrology, and ID recommendations.  -Urine and Blood cultures+ for ecoli.   -RVP negative, COVID negative   -No uterine tenderness,  No CVA tenderness   -Tylenol 975mg q6h PRN for fever, currently afebrile   -s/p MRI- no renal stones.   -ID following: pending their antibiotic recommendations for outpatient care -DCDA twin gestation at 17w4d admitted for management of urosepsis and bacteremia. downgraded from MICU, feeling better today, asymptomatic today. FHR x 2 assessed at bedside. Continue FH q shift.  Appreciate MICU, Nephrology, and ID recommendations.  -Urine and Blood cultures+ for ecoli.   -RVP negative, COVID negative   -No uterine tenderness,  No CVA tenderness   -Tylenol 975mg q6h PRN for fever, currently afebrile   -s/p MRI- no renal stones.   -ID following: rec repeat BCx, UCx prior to midline placement for outpatient IV antibiotic regimen

## 2023-10-08 NOTE — PROGRESS NOTE ADULT - SUBJECTIVE AND OBJECTIVE BOX
ASHISH YANES  [facility] 3EST 3101 01    23y  at 17.4 wks GA by first trimester sonogram who is admitted for management of urosepsis currently on zosyn.    SUBJECTIVE:  Pt seen and examined at bedside.   No complaints this am, reports that she is feeling better.    Denies back pain, fevers, sweats, chills.  Denies vaginal bleeding, leakage of fluid, cramping and contractions.     Vital Signs Last 24 Hrs  T(C): 36.7 (08 Oct 2023 08:38), Max: 36.9 (08 Oct 2023 00:15)  T(F): 98 (08 Oct 2023 08:38), Max: 98.4 (08 Oct 2023 00:15)  HR: 101 (08 Oct 2023 08:38) (88 - 102)  BP: 99/68 (08 Oct 2023 08:38) (86/53 - 99/68)  RR: 18 (08 Oct 2023 08:38) (17 - 18)  SpO2: 96% (08 Oct 2023 08:38) (96% - 99%)    Parameters below as of 08 Oct 2023 08:38  Patient On (Oxygen Delivery Method): room air      Physical Exam:  General: AAOx3, NAD  Lungs: breathing comfortably on RA  Abdomen: soft, non-tender, gravid uterus  MSK: No CVA tenderness   Ext: No cyanosis, edema or calf tenderness     ASHISH YANES  [facility] 3EST 3101 01    23y  at 17.4 wks GA by first trimester sonogram who is admitted for management of urosepsis currently on zosyn.    SUBJECTIVE:  Pt seen and examined at bedside.   No complaints this am, reports that she is feeling better.    Denies back pain, fevers, sweats, chills.  Denies vaginal bleeding, leakage of fluid, cramping and contractions.     Vital Signs Last 24 Hrs  T(C): 36.7 (08 Oct 2023 08:38), Max: 36.9 (08 Oct 2023 00:15)  T(F): 98 (08 Oct 2023 08:38), Max: 98.4 (08 Oct 2023 00:15)  HR: 101 (08 Oct 2023 08:38) (88 - 102)  BP: 99/68 (08 Oct 2023 08:38) (86/53 - 99/68)  RR: 18 (08 Oct 2023 08:38) (17 - 18)  SpO2: 96% (08 Oct 2023 08:38) (96% - 99%)    Parameters below as of 08 Oct 2023 08:38  Patient On (Oxygen Delivery Method): room air      Physical Exam:  General: AAOx3, NAD  Lungs: breathing comfortably on RA  Abdomen: soft, non-tender, gravid uterus  MSK: No CVA tenderness   Ext: No cyanosis, edema or calf tenderness    FH:  Baby A: 143bpm  Baby B: 155 bpm

## 2023-10-08 NOTE — PROGRESS NOTE ADULT - ASSESSMENT
23y  at 17.4 wks GA by first trimester sonogram who is admitted for management of urosepsis currently on zosyn.

## 2023-10-09 DIAGNOSIS — Z29.9 ENCOUNTER FOR PROPHYLACTIC MEASURES, UNSPECIFIED: ICD-10-CM

## 2023-10-09 DIAGNOSIS — R78.81 BACTEREMIA: ICD-10-CM

## 2023-10-09 LAB
ALBUMIN SERPL ELPH-MCNC: 2.8 G/DL — LOW (ref 3.3–5.2)
ALP SERPL-CCNC: 62 U/L — SIGNIFICANT CHANGE UP (ref 40–120)
ALT FLD-CCNC: 46 U/L — HIGH
ANION GAP SERPL CALC-SCNC: 14 MMOL/L — SIGNIFICANT CHANGE UP (ref 5–17)
AST SERPL-CCNC: 40 U/L — HIGH
BILIRUB SERPL-MCNC: 0.2 MG/DL — LOW (ref 0.4–2)
BUN SERPL-MCNC: 5.4 MG/DL — LOW (ref 8–20)
CALCIUM SERPL-MCNC: 8.8 MG/DL — SIGNIFICANT CHANGE UP (ref 8.4–10.5)
CHLORIDE SERPL-SCNC: 103 MMOL/L — SIGNIFICANT CHANGE UP (ref 96–108)
CO2 SERPL-SCNC: 19 MMOL/L — LOW (ref 22–29)
CREAT SERPL-MCNC: 0.25 MG/DL — LOW (ref 0.5–1.3)
EGFR: 158 ML/MIN/1.73M2 — SIGNIFICANT CHANGE UP
GLUCOSE SERPL-MCNC: 72 MG/DL — SIGNIFICANT CHANGE UP (ref 70–99)
HCT VFR BLD CALC: 27.7 % — LOW (ref 34.5–45)
HGB BLD-MCNC: 8.9 G/DL — LOW (ref 11.5–15.5)
MAGNESIUM SERPL-MCNC: 1.9 MG/DL — SIGNIFICANT CHANGE UP (ref 1.6–2.6)
MCHC RBC-ENTMCNC: 29.5 PG — SIGNIFICANT CHANGE UP (ref 27–34)
MCHC RBC-ENTMCNC: 32.1 GM/DL — SIGNIFICANT CHANGE UP (ref 32–36)
MCV RBC AUTO: 91.7 FL — SIGNIFICANT CHANGE UP (ref 80–100)
PHOSPHATE SERPL-MCNC: 3.6 MG/DL — SIGNIFICANT CHANGE UP (ref 2.4–4.7)
PLATELET # BLD AUTO: 274 K/UL — SIGNIFICANT CHANGE UP (ref 150–400)
POTASSIUM SERPL-MCNC: 4.1 MMOL/L — SIGNIFICANT CHANGE UP (ref 3.5–5.3)
POTASSIUM SERPL-SCNC: 4.1 MMOL/L — SIGNIFICANT CHANGE UP (ref 3.5–5.3)
PROT SERPL-MCNC: 6.2 G/DL — LOW (ref 6.6–8.7)
RBC # BLD: 3.02 M/UL — LOW (ref 3.8–5.2)
RBC # FLD: 13.6 % — SIGNIFICANT CHANGE UP (ref 10.3–14.5)
SODIUM SERPL-SCNC: 136 MMOL/L — SIGNIFICANT CHANGE UP (ref 135–145)
WBC # BLD: 5.19 K/UL — SIGNIFICANT CHANGE UP (ref 3.8–10.5)
WBC # FLD AUTO: 5.19 K/UL — SIGNIFICANT CHANGE UP (ref 3.8–10.5)

## 2023-10-09 PROCEDURE — 99232 SBSQ HOSP IP/OBS MODERATE 35: CPT

## 2023-10-09 PROCEDURE — 99232 SBSQ HOSP IP/OBS MODERATE 35: CPT | Mod: GC

## 2023-10-09 RX ADMIN — Medication 100 MILLIGRAM(S): at 22:07

## 2023-10-09 RX ADMIN — Medication 325 MILLIGRAM(S): at 11:42

## 2023-10-09 RX ADMIN — Medication 100 MILLIGRAM(S): at 06:16

## 2023-10-09 RX ADMIN — SENNA PLUS 2 TABLET(S): 8.6 TABLET ORAL at 22:06

## 2023-10-09 RX ADMIN — CEFTRIAXONE 2000 MILLIGRAM(S): 500 INJECTION, POWDER, FOR SOLUTION INTRAMUSCULAR; INTRAVENOUS at 11:42

## 2023-10-09 RX ADMIN — Medication 100 MILLIGRAM(S): at 13:25

## 2023-10-09 RX ADMIN — Medication 1 TABLET(S): at 11:42

## 2023-10-09 NOTE — PROGRESS NOTE ADULT - PROBLEM SELECTOR PLAN 3
-Up to date on prenatals   -9/20 MFM sonogram: AA: inferior transverse, posterior placenta, EFW: 137g 91%ile, BB: superior, anterior, EFW:123g 66%ile; pt will need outpatient follow up in approximately 1 week after discharge for cervical length and fetal anatomy scan. -Pt will need outpatient follow up in approximately 1 week after discharge for cervical length and fetal anatomy scan.

## 2023-10-09 NOTE — PROGRESS NOTE ADULT - ASSESSMENT
23y  at 17.5 wks GA by first trimester sonogram who is admitted for management of urosepsis currently on zosyn. 23y  at 17.5 wks GA by first trimester sonogram. Admitted for management of urosepsis. On Zosyn antibiotic therapy.

## 2023-10-09 NOTE — PROGRESS NOTE ADULT - ASSESSMENT
23y  at 16w6d GA by first trimester sono who presents to ED for . Patient denies vaginal bleeding, contractions and leakage of fluid. She endorses good fetal movement. Denies fevers, chills, nausea and vomiting. No other complaints at this time. PT Icelandic SPEAKING IN US ABOUT 6  MONTHS   BOBO: 3/13/24  LMP: 23  Prenatal course is significant for:  1. Di/di twin gestation   2. UTI s/p Keflex    AS ABOVE 16 WEEK PREGNANT WITH FEVERS CHILLS  BLOOD CX ECOLI   OVERALL IMPROVED    MRI WITH  PERINEPHRIC STRANDING AND HYDROURETERONEPHROSIS      ON  ROCEPHIN   WHEN READY FOR D/C CAN  CHANGE TO   KEFLEX  500 QID    WOULD TREAT FOR TOTAL 2 WEEKS  WILL FOLLOWUP

## 2023-10-09 NOTE — PROGRESS NOTE ADULT - SUBJECTIVE AND OBJECTIVE BOX
INFECTIOUS DISEASES AND INTERNAL MEDICINE at [place]  =======================================================  Arnav Morales MD  Diplomates American Board of Internal Medicine and Infectious Diseases  Telephone [phone]  Fax            [phone]  =======================================================    ASHISH YANES [id number]    Follow up: ECOLI BACTEREMIA    Allergies:  No Known Allergies      Medications:  acetaminophen     Tablet .. 975 milliGRAM(s) Oral every 6 hours PRN  chlorhexidine 2% Cloths 1 Application(s) Topical <User Schedule>  ferrous    sulfate 325 milliGRAM(s) Oral daily  influenza   Vaccine 0.5 milliLiter(s) IntraMuscular once  norepinephrine Infusion 0.05 MICROgram(s)/kG/Min IV Continuous <Continuous>  phenazopyridine 100 milliGRAM(s) Oral every 8 hours  piperacillin/tazobactam IVPB.. 3.375 Gram(s) IV Intermittent every 8 hours  prenatal multivitamin 1 Tablet(s) Oral daily  senna 2 Tablet(s) Oral at bedtime    SOCIAL       FAMILY   FAMILY HISTORY:    REVIEW OF SYSTEMS:  CONSTITUTIONAL:  No Fever or chills  HEENT:   No diplopia or blurred vision.  No earache, sore throat or runny nose.  CARDIOVASCULAR:  No pressure, squeezing, strangling, tightness, heaviness or aching about the chest, neck, axilla or epigastrium.  RESPIRATORY:  No cough, shortness of breath, PND or orthopnea.  GASTROINTESTINAL:  No nausea, vomiting or diarrhea.  GENITOURINARY:  No dysuria, frequency or urgency. No Blood in urine  MUSCULOSKELETAL:   moves all joints  SKIN:  No change in skin, hair or nails.  NEUROLOGIC:  No paresthesias, fasciculations, seizures or weakness.  PSYCHIATRIC:  No disorder of thought or mood.  ENDOCRINE:  No heat or cold intolerance, polyuria or polydipsia.  HEMATOLOGICAL:  No easy bruising or bleeding.            Physical Exam:  I ICU Vital Signs Last 24 Hrs  T(C): 36.6 (06 Oct 2023 11:05), Max: 36.9 (05 Oct 2023 23:17)  T(F): 97.9 (06 Oct 2023 11:05), Max: 98.4 (05 Oct 2023 23:17)  HR: 86 (06 Oct 2023 13:00) (73 - 123)  BP: 90/54 (06 Oct 2023 13:00) (70/40 - 110/61)  BP(mean): 64 (06 Oct 2023 13:00) (50 - 82)  ABP: --  ABP(mean): --  RR: 15 (06 Oct 2023 13:00) (0 - 25)  SpO2: 99% (06 Oct 2023 13:00) (96% - 100%)    O2 Parameters below as of 06 Oct 2023 12:00  Patient On (Oxygen Delivery Method): room air                  GEN: NAD,   HEENT: normocephalic and atraumatic. EOMI. LAKISHA.    NECK: Supple. No carotid bruits.  No lymphadenopathy or thyromegaly.  LUNGS: Clear to auscultation.  HEART: Regular rate and rhythm without murmur.  ABDOMEN: Soft, nontender, and nondistended.  Positive bowel sounds.    : No CVA tenderness  EXTREMITIES: Without any cyanosis, clubbing, rash, lesions or edema.  MSK: no joint swelling  NEUROLOGIC: Cranial nerves II through XII are grossly intact.  PSYCHIATRIC: Appropriate affect .  SKIN: No ulceration or induration present.        Labs:  Vitals:  ============  T(F): 98.1    =======================================================  Current Antibiotics:  piperacillin/tazobactam IVPB.. 3.375 Gram(s) IV Intermittent every 8 hours    Other medications:  chlorhexidine 2% Cloths 1 Application(s) Topical <User Schedule>  ferrous    sulfate 325 milliGRAM(s) Oral daily  influenza   Vaccine 0.5 milliLiter(s) IntraMuscular once  norepinephrine Infusion 0.05 MICROgram(s)/kG/Min IV Continuous <Continuous>  phenazopyridine 100 milliGRAM(s) Oral every 8 hours  prenatal multivitamin 1 Tablet(s) Oral daily  senna 2 Tablet(s) Oral at bedtime      =======================================================  Labs:                                                   8.9    5.19  )-----------( 274      ( 09 Oct 2023 05:26 )             27.7   10-09    136  |  103  |  5.4<L>  ----------------------------<  72  4.1   |  19.0<L>  |  0.25<L>    Ca    8.8      09 Oct 2023 05:26  Phos  3.6     10-09  Mg     1.9     10-09    TPro  6.2<L>  /  Alb  2.8<L>  /  TBili  0.2<L>  /  DBili  x   /  AST  40<H>  /  ALT  46<H>  /  AlkPhos  62  10-09        Culture - Urine (collected 10-02-23 @ 22:25)  Source: Clean Catch Clean Catch (Midstream)    Culture - Blood (collected 10-02-23 @ 20:08)  Source: .Blood Blood-Peripheral  Gram Stain (10-03-23 @ 13:14):    Growth in aerobic bottle: Gram Negative Rods  Final Report (10-05-23 @ 10:55):    Growth in aerobic bottle: Escherichia coli    Direct identification is available within approximately 3-5    hours either by Blood Panel Multiplexed PCR or Direct    MALDI-TOF. Details: [email or web address]  Organism: Blood Culture PCR  Escherichia coli (10-05-23 @ 10:55)  Organism: Escherichia coli (10-05-23 @ 10:55)    Sensitivities:      Method Type: HILARIA      -  Amikacin: S <=16      -  Ampicillin: S <=8 These ampicillin results predict results for amoxicillin      -  Ampicillin/Sulbactam: S <=4/2      -  Aztreonam: S <=4      -  Cefazolin: S <=2      -  Cefepime: S <=2      -  Cefoxitin: S <=8      -  Ceftriaxone: S <=1      -  Ciprofloxacin: S <=0.25      -  Ertapenem: S <=0.5      -  Gentamicin: S <=2      -  Imipenem: S <=1      -  Levofloxacin: S <=0.5      -  Meropenem: S <=1      -  Piperacillin/Tazobactam: S <=8      -  Tobramycin: S <=2      -  Trimethoprim/Sulfamethoxazole: S <=0.5/9.5  Organism: Blood Culture PCR (10-05-23 @ 10:55)    Sensitivities:      Method Type: PCR      -  Escherichia coli: Detec    Culture - Blood (collected 10-02-23 @ 20:00)  Source: .Blood Blood-Peripheral      Creatinine: 0.30 mg/dL (10-05-23 @ 16:20)  Creatinine: 0.33 mg/dL (10-05-23 @ 03:10)  Creatinine: 0.26 mg/dL (10-04-23 @ 17:45)  Creatinine: 0.30 mg/dL (10-04-23 @ 03:57)  Creatinine: 0.39 mg/dL (10-03-23 @ 17:45)  Creatinine: 0.41 mg/dL (10-03-23 @ 15:40)  Creatinine: 0.37 mg/dL (10-03-23 @ 07:14)  Creatinine: 0.38 mg/dL (10-02-23 @ 20:20)        Ferritin: 57 ng/mL (10-03-23 @ 07:14)      WBC Count: 5.26 K/uL (10-05-23 @ 16:20)  WBC Count: 8.18 K/uL (10-05-23 @ 03:10)  WBC Count: 9.54 K/uL (10-04-23 @ 03:57)  WBC Count: 7.38 K/uL (10-03-23 @ 17:45)  WBC Count: 7.97 K/uL (10-03-23 @ 07:14)  WBC Count: 8.70 K/uL (10-02-23 @ 20:20)    SARS-CoV-2: NotDetec (10-02-23 @ 20:50)      Alkaline Phosphatase: 55 U/L (10-05-23 @ 03:10)  Alkaline Phosphatase: 56 U/L (10-04-23 @ 17:45)  Alkaline Phosphatase: 56 U/L (10-02-23 @ 20:20)  Alanine Aminotransferase (ALT/SGPT): 22 U/L (10-05-23 @ 03:10)  Alanine Aminotransferase (ALT/SGPT): 15 U/L (10-04-23 @ 17:45)  Alanine Aminotransferase (ALT/SGPT): 9 U/L (10-02-23 @ 20:20)  Aspartate Aminotransferase (AST/SGOT): 40 U/L (10-05-23 @ 03:10)  Aspartate Aminotransferase (AST/SGOT): 25 U/L (10-04-23 @ 17:45)  Aspartate Aminotransferase (AST/SGOT): 15 U/L (10-02-23 @ 20:20)  Bilirubin Total: 0.5 mg/dL (10-05-23 @ 03:10)  Bilirubin Total: 0.5 mg/dL (10-04-23 @ 17:45)  Bilirubin Total: 0.6 mg/dL (10-02-23 @ 20:20)  Bilirubin Direct: 0.2 mg/dL (10-04-23 @ 17:45)

## 2023-10-09 NOTE — PROGRESS NOTE ADULT - PROBLEM SELECTOR PLAN 1
-DCDA twin gestation at 17w4d admitted for management of urosepsis and bacteremia. downgraded from MICU, feeling better today, asymptomatic today. FHR x 2 assessed at bedside. Continue FH q day.  Appreciate Nephrology, and ID recommendations.  -Urine and Blood cultures+ for ecoli.   -RVP negative, COVID negative   -No uterine tenderness,  No CVA tenderness   -Tylenol 975mg q6h PRN for fever, currently afebrile   -s/p MRI- no renal stones.   -ID following: rec repeat BCx, UCx prior to midline placement for outpatient IV antibiotic regimen

## 2023-10-09 NOTE — PROGRESS NOTE ADULT - PROBLEM SELECTOR PLAN 2
-Pt will need outpatient follow up in approximately 1 week after discharge for cervical length and fetal anatomy scan. - Blood cultures positive for E. Coli  - Repeat blood cultures done; results pending  - Duration of IV antibiotic treatment as per ID recommendation

## 2023-10-09 NOTE — PROGRESS NOTE ADULT - SUBJECTIVE AND OBJECTIVE BOX
ASHISH YANES  [facility] 3EST 3101 01    23y  at 17.5 wks GA by first trimester sonogram who is admitted for management of urosepsis currently on zosyn.    SUBJECTIVE:  Pt seen and examined at bedside.   No complaints this am, reports that she is feeling better.    Denies back pain, fevers, sweats, chills.  Denies vaginal bleeding, leakage of fluid, cramping and contractions.     Vital Signs Last 24 Hrs  T(C): 36.7 (09 Oct 2023 05:03), Max: 36.8 (08 Oct 2023 17:20)  T(F): 98 (09 Oct 2023 05:03), Max: 98.3 (08 Oct 2023 17:20)  HR: 99 (09 Oct 2023 05:03) (82 - 101)  BP: 96/63 (09 Oct 2023 05:03) (93/58 - 101/64)  BP(mean): --  RR: 18 (09 Oct 2023 05:03) (17 - 18)  SpO2: 97% (09 Oct 2023 05:03) (95% - 98%)    Parameters below as of 09 Oct 2023 05:03  Patient On (Oxygen Delivery Method): room air      Physical Exam:  General: AAOx3, NAD  Lungs: breathing comfortably on RA  Abdomen: soft, non-tender, gravid uterus  MSK: No CVA tenderness   Ext: No cyanosis, edema or calf tenderness    FH:  Baby A: 143bpm  Baby B: 155 bpm ASHISH YANES  [facility] 3EST 3101 01    23y  at 17.5 wks GA by first trimester sonogram who is admitted for management of urosepsis currently on zosyn.    SUBJECTIVE:  Pt seen and examined at bedside.   No complaints this am, reports that she is feeling better.    Denies back pain, fevers, sweats, chills.  Denies vaginal bleeding, leakage of fluid, cramping and contractions.     Vital Signs Last 24 Hrs  T(C): 36.7 (09 Oct 2023 05:03), Max: 36.8 (08 Oct 2023 17:20)  T(F): 98 (09 Oct 2023 05:03), Max: 98.3 (08 Oct 2023 17:20)  HR: 99 (09 Oct 2023 05:03) (82 - 101)  BP: 96/63 (09 Oct 2023 05:03) (93/58 - 101/64)  BP(mean): --  RR: 18 (09 Oct 2023 05:03) (17 - 18)  SpO2: 97% (09 Oct 2023 05:03) (95% - 98%)    Parameters below as of 09 Oct 2023 05:03  Patient On (Oxygen Delivery Method): room air      Physical Exam:  General: AAOx3, NAD  Lungs: breathing comfortably on RA  Abdomen: soft, non-tender, gravid uterus  MSK: No CVA tenderness   Ext: No cyanosis, edema or calf tenderness    FH:  Baby A: 145bpm  Baby B: 156 bpm ASHISH YANES  [facility] 3EST 3101 01    23y  at 17.5 wks GA by first trimester sonogram. She was  hospitalized with urosepsis and bacteremia; currently on Zosyn Rx.    SUBJECTIVE:  Pt seen and examined at bedside.   No complaints this am, reports that she is feeling better.    Denies back pain, fevers, sweats, chills.  Denies vaginal bleeding, leakage of fluid, cramping and contractions.     Vital Signs Last 24 Hrs  T(C): 36.7 (09 Oct 2023 05:03), Max: 36.8 (08 Oct 2023 17:20)  T(F): 98 (09 Oct 2023 05:03), Max: 98.3 (08 Oct 2023 17:20)  HR: 99 (09 Oct 2023 05:03) (82 - 101)  BP: 96/63 (09 Oct 2023 05:03) (93/58 - 101/64)  BP(mean): --  RR: 18 (09 Oct 2023 05:03) (17 - 18)  SpO2: 97% (09 Oct 2023 05:03) (95% - 98%)    Parameters below as of 09 Oct 2023 05:03  Patient On (Oxygen Delivery Method): room air      Physical Exam:  General: AAOx3, NAD  Lungs: breathing comfortably on RA  Abdomen: soft, non-tender, gravid uterus  MSK: No CVA tenderness   Ext: No cyanosis, edema or calf tenderness    FH:  Baby A: 145bpm  Baby B: 156 bpm

## 2023-10-10 ENCOUNTER — TRANSCRIPTION ENCOUNTER (OUTPATIENT)
Age: 23
End: 2023-10-10

## 2023-10-10 VITALS
DIASTOLIC BLOOD PRESSURE: 61 MMHG | RESPIRATION RATE: 17 BRPM | HEART RATE: 86 BPM | OXYGEN SATURATION: 97 % | SYSTOLIC BLOOD PRESSURE: 97 MMHG | TEMPERATURE: 98 F

## 2023-10-10 LAB
ANION GAP SERPL CALC-SCNC: 13 MMOL/L — SIGNIFICANT CHANGE UP (ref 5–17)
BUN SERPL-MCNC: 6.5 MG/DL — LOW (ref 8–20)
CALCIUM SERPL-MCNC: 8.8 MG/DL — SIGNIFICANT CHANGE UP (ref 8.4–10.5)
CHLORIDE SERPL-SCNC: 99 MMOL/L — SIGNIFICANT CHANGE UP (ref 96–108)
CO2 SERPL-SCNC: 21 MMOL/L — LOW (ref 22–29)
CREAT SERPL-MCNC: 0.28 MG/DL — LOW (ref 0.5–1.3)
EGFR: 153 ML/MIN/1.73M2 — SIGNIFICANT CHANGE UP
GLUCOSE SERPL-MCNC: 82 MG/DL — SIGNIFICANT CHANGE UP (ref 70–99)
HCT VFR BLD CALC: 27.4 % — LOW (ref 34.5–45)
HGB BLD-MCNC: 8.8 G/DL — LOW (ref 11.5–15.5)
MCHC RBC-ENTMCNC: 29.5 PG — SIGNIFICANT CHANGE UP (ref 27–34)
MCHC RBC-ENTMCNC: 32.1 GM/DL — SIGNIFICANT CHANGE UP (ref 32–36)
MCV RBC AUTO: 91.9 FL — SIGNIFICANT CHANGE UP (ref 80–100)
PLATELET # BLD AUTO: 340 K/UL — SIGNIFICANT CHANGE UP (ref 150–400)
POTASSIUM SERPL-MCNC: 3.6 MMOL/L — SIGNIFICANT CHANGE UP (ref 3.5–5.3)
POTASSIUM SERPL-SCNC: 3.6 MMOL/L — SIGNIFICANT CHANGE UP (ref 3.5–5.3)
RBC # BLD: 2.98 M/UL — LOW (ref 3.8–5.2)
RBC # FLD: 13.7 % — SIGNIFICANT CHANGE UP (ref 10.3–14.5)
SODIUM SERPL-SCNC: 133 MMOL/L — LOW (ref 135–145)
WBC # BLD: 5.86 K/UL — SIGNIFICANT CHANGE UP (ref 3.8–10.5)
WBC # FLD AUTO: 5.86 K/UL — SIGNIFICANT CHANGE UP (ref 3.8–10.5)

## 2023-10-10 PROCEDURE — 86900 BLOOD TYPING SEROLOGIC ABO: CPT

## 2023-10-10 PROCEDURE — 82330 ASSAY OF CALCIUM: CPT

## 2023-10-10 PROCEDURE — 93306 TTE W/DOPPLER COMPLETE: CPT

## 2023-10-10 PROCEDURE — 85018 HEMOGLOBIN: CPT

## 2023-10-10 PROCEDURE — 84100 ASSAY OF PHOSPHORUS: CPT

## 2023-10-10 PROCEDURE — 72195 MRI PELVIS W/O DYE: CPT

## 2023-10-10 PROCEDURE — 99285 EMERGENCY DEPT VISIT HI MDM: CPT | Mod: 25

## 2023-10-10 PROCEDURE — 83735 ASSAY OF MAGNESIUM: CPT

## 2023-10-10 PROCEDURE — 80076 HEPATIC FUNCTION PANEL: CPT

## 2023-10-10 PROCEDURE — 87641 MR-STAPH DNA AMP PROBE: CPT

## 2023-10-10 PROCEDURE — 96375 TX/PRO/DX INJ NEW DRUG ADDON: CPT

## 2023-10-10 PROCEDURE — 80053 COMPREHEN METABOLIC PANEL: CPT

## 2023-10-10 PROCEDURE — 82728 ASSAY OF FERRITIN: CPT

## 2023-10-10 PROCEDURE — 84466 ASSAY OF TRANSFERRIN: CPT

## 2023-10-10 PROCEDURE — 87186 SC STD MICRODIL/AGAR DIL: CPT

## 2023-10-10 PROCEDURE — 83690 ASSAY OF LIPASE: CPT

## 2023-10-10 PROCEDURE — 87040 BLOOD CULTURE FOR BACTERIA: CPT

## 2023-10-10 PROCEDURE — 87640 STAPH A DNA AMP PROBE: CPT

## 2023-10-10 PROCEDURE — 86850 RBC ANTIBODY SCREEN: CPT

## 2023-10-10 PROCEDURE — 82435 ASSAY OF BLOOD CHLORIDE: CPT

## 2023-10-10 PROCEDURE — 82962 GLUCOSE BLOOD TEST: CPT

## 2023-10-10 PROCEDURE — 85014 HEMATOCRIT: CPT

## 2023-10-10 PROCEDURE — 36415 COLL VENOUS BLD VENIPUNCTURE: CPT

## 2023-10-10 PROCEDURE — 80048 BASIC METABOLIC PNL TOTAL CA: CPT

## 2023-10-10 PROCEDURE — 82947 ASSAY GLUCOSE BLOOD QUANT: CPT

## 2023-10-10 PROCEDURE — 85610 PROTHROMBIN TIME: CPT

## 2023-10-10 PROCEDURE — 87086 URINE CULTURE/COLONY COUNT: CPT

## 2023-10-10 PROCEDURE — 84132 ASSAY OF SERUM POTASSIUM: CPT

## 2023-10-10 PROCEDURE — T1013: CPT

## 2023-10-10 PROCEDURE — 83010 ASSAY OF HAPTOGLOBIN QUANT: CPT

## 2023-10-10 PROCEDURE — 85730 THROMBOPLASTIN TIME PARTIAL: CPT

## 2023-10-10 PROCEDURE — 81001 URINALYSIS AUTO W/SCOPE: CPT

## 2023-10-10 PROCEDURE — 93005 ELECTROCARDIOGRAM TRACING: CPT

## 2023-10-10 PROCEDURE — 82803 BLOOD GASES ANY COMBINATION: CPT

## 2023-10-10 PROCEDURE — 76775 US EXAM ABDO BACK WALL LIM: CPT

## 2023-10-10 PROCEDURE — 83540 ASSAY OF IRON: CPT

## 2023-10-10 PROCEDURE — 83605 ASSAY OF LACTIC ACID: CPT

## 2023-10-10 PROCEDURE — 0225U NFCT DS DNA&RNA 21 SARSCOV2: CPT

## 2023-10-10 PROCEDURE — 87150 DNA/RNA AMPLIFIED PROBE: CPT

## 2023-10-10 PROCEDURE — 74181 MRI ABDOMEN W/O CONTRAST: CPT

## 2023-10-10 PROCEDURE — 96374 THER/PROPH/DIAG INJ IV PUSH: CPT

## 2023-10-10 PROCEDURE — 99232 SBSQ HOSP IP/OBS MODERATE 35: CPT | Mod: GC

## 2023-10-10 PROCEDURE — 85027 COMPLETE CBC AUTOMATED: CPT

## 2023-10-10 PROCEDURE — 85025 COMPLETE CBC W/AUTO DIFF WBC: CPT

## 2023-10-10 PROCEDURE — 86901 BLOOD TYPING SEROLOGIC RH(D): CPT

## 2023-10-10 PROCEDURE — 83550 IRON BINDING TEST: CPT

## 2023-10-10 PROCEDURE — 84295 ASSAY OF SERUM SODIUM: CPT

## 2023-10-10 PROCEDURE — 87077 CULTURE AEROBIC IDENTIFY: CPT

## 2023-10-10 RX ORDER — CEPHALEXIN 500 MG
1 CAPSULE ORAL
Qty: 56 | Refills: 0
Start: 2023-10-10 | End: 2023-10-23

## 2023-10-10 RX ADMIN — Medication 100 MILLIGRAM(S): at 06:49

## 2023-10-10 RX ADMIN — CEFTRIAXONE 2000 MILLIGRAM(S): 500 INJECTION, POWDER, FOR SOLUTION INTRAMUSCULAR; INTRAVENOUS at 12:16

## 2023-10-10 RX ADMIN — Medication 1 TABLET(S): at 12:17

## 2023-10-10 RX ADMIN — Medication 325 MILLIGRAM(S): at 12:16

## 2023-10-10 NOTE — PROGRESS NOTE ADULT - PROBLEM SELECTOR PLAN 5
-Regular diet   -SCDs while in bed   -Heparin subQ on HD#3.
-Up to date on prenatals   -9/20 MFM sonogram: AA: inferior transverse, posterior placenta, EFW: 137g 91%ile, BB: superior, anterior, EFW:123g 66%ile; pt will need outpatient follow up in approximately 1 week after discharge for cervical length and fetal anatomy scan.
-Up to date on prenatals   -9/20 MFM sonogram: AA: inferior transverse, posterior placenta, EFW: 137g 91%ile, BB: superior, anterior, EFW:123g 66%ile; pt will need outpatient follow up in approximately 1 week after discharge for cervical length and fetal anatomy scan.

## 2023-10-10 NOTE — PROGRESS NOTE ADULT - SUBJECTIVE AND OBJECTIVE BOX
ASHISH YANES  [facility] 3EST 3101 01    23y  at 17.6 wks GA by first trimester sonogram. She was  hospitalized with urosepsis and bacteremia; currently on Zosyn Rx.    SUBJECTIVE:  Pt seen and examined at bedside.   No complaints this am, reports that she is feeling better.    Denies back pain, fevers, sweats, chills.  Denies vaginal bleeding, leakage of fluid, cramping and contractions.     Vital Signs Last 24 Hrs  T(C): 36.3 (10 Oct 2023 05:00), Max: 36.9 (09 Oct 2023 17:25)  T(F): 97.3 (10 Oct 2023 05:00), Max: 98.4 (09 Oct 2023 17:25)  HR: 69 (10 Oct 2023 05:00) (69 - 91)  BP: 97/61 (10 Oct 2023 05:00) (91/56 - 99/62)  BP(mean): --  RR: 18 (10 Oct 2023 05:00) (18 - 18)  SpO2: 98% (10 Oct 2023 05:00) (94% - 98%)    Parameters below as of 10 Oct 2023 05:00  Patient On (Oxygen Delivery Method): room air      Physical Exam:  General: AAOx3, NAD  Lungs: breathing comfortably on RA  Abdomen: soft, non-tender, gravid uterus  MSK: No CVA tenderness   Ext: No cyanosis, edema or calf tenderness    FH:  Baby A: 143 bpm  Baby B: 152 bpm ASHISH YANES  [facility] 3EST 3101 01    23y  at 17.6 wks GA by first trimester sonogram. She was hospitalized with urosepsis and bacteremia; currently on Zosyn Rx.    SUBJECTIVE:  Pt seen and examined at bedside.   No complaints this am, reports that she is feeling better.    Denies back pain, fevers, sweats, chills.  Denies vaginal bleeding, leakage of fluid, cramping and contractions.     Vital Signs Last 24 Hrs  T(C): 36.3 (10 Oct 2023 05:00), Max: 36.9 (09 Oct 2023 17:25)  T(F): 97.3 (10 Oct 2023 05:00), Max: 98.4 (09 Oct 2023 17:25)  HR: 69 (10 Oct 2023 05:00) (69 - 91)  BP: 97/61 (10 Oct 2023 05:00) (91/56 - 99/62)  BP(mean): --  RR: 18 (10 Oct 2023 05:00) (18 - 18)  SpO2: 98% (10 Oct 2023 05:00) (94% - 98%)    Parameters below as of 10 Oct 2023 05:00  Patient On (Oxygen Delivery Method): room air    Physical Exam:  General: AAOx3, NAD  Lungs: breathing comfortably on RA  Abdomen: soft, non-tender, gravid uterus  MSK: No CVA tenderness   Ext: No cyanosis, edema or calf tenderness    FH:  Baby A: 143 bpm  Baby B: 152 bpm

## 2023-10-10 NOTE — PROGRESS NOTE ADULT - TIME BILLING
Twin pregnancy complicated by urosepsis and bacteremia.
Twin pregnancy complicated by urosepsis and bacteremia.
Twin pregnancy complicated by urosepsis and other comorbidities.

## 2023-10-10 NOTE — DISCHARGE NOTE PROVIDER - NSDCFUADDINST_GEN_ALL_CORE_FT
Please take your antibiotics as prescribed  Follow up with [facility] and MFM within 1 week of discharge  Please return if symptoms return

## 2023-10-10 NOTE — DISCHARGE NOTE PROVIDER - CARE PROVIDER_API CALL
Paula Nino  Obstetrics and Gynecology  [facility] [place] [address]  Phone: [phone]  Fax: [phone]  Follow Up Time:

## 2023-10-10 NOTE — PROGRESS NOTE ADULT - PROBLEM SELECTOR PLAN 1
-DCDA twin gestation at 17w6d admitted for management of urosepsis and bacteremia. downgraded from MICU, feeling better today, asymptomatic today. FHR x 2 assessed at bedside. Continue FH q day.  Appreciate Nephrology, and ID recommendations.  -Urine and Blood cultures+ for ecoli.   -RVP negative, COVID negative   -No uterine tenderness,  No CVA tenderness   -Tylenol 975mg q6h PRN for fever, currently afebrile   -s/p MRI- no renal stones.   -ID following: repeat BCx neg, can transition to PO keflex 500 QID x 2 weeks per newest ID recs -DCDA twin gestation at 17w6d developed urosepsis and bacteremia. Downgraded from MICU, feeling better today, asymptomatic today. FHR x 2 assessed at bedside. Continue FH q day.  Appreciate Nephrology, and ID recommendations.  -Urine and Blood cultures+ for E. coli.   -RVP negative, COVID negative   -No uterine tenderness,  No CVA tenderness   -Tylenol 975mg q6h PRN for fever, currently afebrile   -s/p MRI- no renal stones.   -ID following: repeat BCs were neg, can transition to PO keflex 500 QID x 2 weeks per newest ID recommendations

## 2023-10-10 NOTE — PROGRESS NOTE ADULT - PROBLEM SELECTOR PROBLEM 5
Healthcare maintenance
Hyponatremia
17 weeks gestation of pregnancy
Hyponatremia
17 weeks gestation of pregnancy

## 2023-10-10 NOTE — PROGRESS NOTE ADULT - ASSESSMENT
23y  at 17.6 wks GA by first trimester sonogram. Admitted for management of urosepsis. s/p Zosyn antibiotic therapy. 23y  at 17.6 wks GA by first trimester sonogram. She has a twin pregnancy. Hospitalized for management of urosepsis and bacterimia.

## 2023-10-10 NOTE — PROGRESS NOTE ADULT - ATTENDING COMMENTS
MFM - IUP at 17 weeks admitted with urosepsis and now bacteremia.  Upgraded to ICU secondary to hypotension requiring pressor support.  Examined at bedside and reporting significant suprapubic pain after Meadows placement. Also mild back pain.  No headache. No vaginal bleeding or OB complaint.  Saturating well on room air.  Fetal status reassuring.  Patient advised of results of evaluation and need for continued ICU care at present.  Care reviewed with ICU resident.  Will try Pyridium for bladder analgesia.  Patient received Tylenol at time of Meadows placement.  If no relief, consider Dilaudid.  Team request CT to evaluate for nephrolithiasis given bilateral hydronephrosis.  No concerns to imaging from OB standpoint.  Will continue to follow.   Ivet Daniels MD  Maternal Fetal Medicine
MFM - DCDA twin gestation at 17+ weeks admitted for management of urosepsis and bacteremia. Remains in MICU for pressor support.  Patient reports some improvement in pain but continues to have significant discomfort from day.  Requesting discontinue.  No OB complaint.  Results of MRI reviewed with patient.  Continue Zosyn and supportive therapy.  FHR x 2 assessed at bedside. Continue FH q shift.  Appreciate MICU, Nephrology, and ID recommendations.   Ivet Daniels MD  Maternal Fetal Medicine
Twin pregnancy complicated by urosepsis and bacteremia. Continue IV antibiotic therapy
23-year-old  female with no medical history admitted with abdominal pain nausea vomiting fever to OB/GYN service she is 16 weeks pregnant with twin pregnancy with hospital course complicated by severe hypotension with systolic in 70s for which she is being admitted to medical ICU for sepsis secondary to E. coli bacteremia most likely secondary to complicated UTI With metabolic acidosis      Continue with Zosyn awaiting blood and urine culture finalization  No urological surgical intervention required  Good urine output, stopped IV fluid  Weaning down on Levophed with possible stopping with MAP goal [id number], trend LFTs RFT's and lactate   MFM following for fetal monitoring  We will DC Meadows at patient request with still close urine output monitoring
Twin pregnancy complicated by urosepsis and bacteremia. Switch to oral antibiotics. Both mother and fetuses are in good condition. Consider discharging home with outpatient follow up within 1 week.
Late entry: Patient seen and examined on the day of service on October 4 with ICU team    23-year-old  female with no medical history admitted with abdominal pain nausea vomiting fever to OB/GYN service she is 16 weeks pregnant with twin pregnancy with hospital course complicated by severe hypotension with systolic in 70s for which she is being admitted to medical ICU for sepsis secondary to E. coli bacteremia most likely secondary to complicated UTI With metabolic acidosis     remains on Levophed  Ultrasound of kidneys suggestive of hydro bilaterally with jetstream positive  Discussed with MFM, urology, and radiology; MRI abdomen pelvis ordered less risk of radiation to fetus which revealed gravid uterus pressing on the ureter causing mild obstruction not requiring any surgical intervention  MAP goal more than 55-60  Indwelling urinary catheter placed with close urinary output monitoring  Pain management with Tylenol  Multivitamin  Antiemetics  IV fluid continue  Continue with Zosyn awaiting blood and urine culture finalization
Twin pregnancy complicated by urosepsis and other comorbidities. Continue IV antibiotics as per ID recommendations and supportive care.

## 2023-10-10 NOTE — DISCHARGE NOTE PROVIDER - HOSPITAL COURSE
admitted for management of urosepsis s/p antibiotic treatment. Patient transferred to post partum unit.  At the time of discharge patient was tolerating regular diet PO, ambulating, voiding, and demonstrating bowel function. Pain well controlled with pain medications PRN.

## 2023-10-10 NOTE — DISCHARGE NOTE NURSING/CASE MANAGEMENT/SOCIAL WORK - PATIENT PORTAL LINK FT
You can access the FollowMyHealth Patient Portal offered by [facility] by registering at the following website: [email or web address]. By joining [employer]’s FollowMyHealth portal, you will also be able to view your health information using other applications (apps) compatible with our system.

## 2023-10-10 NOTE — PROGRESS NOTE ADULT - PROBLEM SELECTOR PLAN 2
- Blood cultures positive for E. Coli  - Repeat blood cultures done; results pending  - s/p duration of IV antibiotic treatment as per ID recommendation - Blood cultures positive for E. Coli  - Repeat blood cultures done; results pending  - duration of IV antibiotic treatment as per ID recommendation  - can transition to PO keflex 500 QID x 2 weeks per newest ID recommendations

## 2023-10-10 NOTE — PROGRESS NOTE ADULT - PROVIDER SPECIALTY LIST ADULT
Critical Care
Critical Care
Infectious Disease
MFMONO
Urology
Infectious Disease
MFMONO
MFMONO
Critical Care
MFMONO

## 2023-10-13 LAB
CULTURE RESULTS: SIGNIFICANT CHANGE UP
CULTURE RESULTS: SIGNIFICANT CHANGE UP
SPECIMEN SOURCE: SIGNIFICANT CHANGE UP
SPECIMEN SOURCE: SIGNIFICANT CHANGE UP

## 2023-11-09 ENCOUNTER — APPOINTMENT (OUTPATIENT)
Dept: ANTEPARTUM | Facility: CLINIC | Age: 23
End: 2023-11-09
Payer: MEDICAID

## 2023-11-09 ENCOUNTER — ASOB RESULT (OUTPATIENT)
Age: 23
End: 2023-11-09

## 2023-11-09 ENCOUNTER — APPOINTMENT (OUTPATIENT)
Dept: MATERNAL FETAL MEDICINE | Facility: CLINIC | Age: 23
End: 2023-11-09
Payer: MEDICAID

## 2023-11-09 VITALS
RESPIRATION RATE: 16 BRPM | BODY MASS INDEX: 29.02 KG/M2 | HEART RATE: 78 BPM | WEIGHT: 170 LBS | DIASTOLIC BLOOD PRESSURE: 62 MMHG | SYSTOLIC BLOOD PRESSURE: 96 MMHG | HEIGHT: 64 IN

## 2023-11-09 DIAGNOSIS — Z86.19 PERSONAL HISTORY OF OTHER INFECTIOUS AND PARASITIC DISEASES: ICD-10-CM

## 2023-11-09 PROCEDURE — 76812 OB US DETAILED ADDL FETUS: CPT

## 2023-11-09 PROCEDURE — 76817 TRANSVAGINAL US OBSTETRIC: CPT | Mod: 59

## 2023-11-09 PROCEDURE — 99204 OFFICE O/P NEW MOD 45 MIN: CPT | Mod: TH

## 2023-11-09 PROCEDURE — 76811 OB US DETAILED SNGL FETUS: CPT

## 2023-11-09 RX ORDER — PRENATAL VIT NO.126/IRON/FOLIC 28MG-0.8MG
28-0.8 TABLET ORAL
Refills: 0 | Status: ACTIVE | COMMUNITY
Start: 2023-11-09

## 2023-11-09 RX ORDER — ASPIRIN 81 MG/1
81 TABLET, CHEWABLE ORAL
Refills: 0 | Status: ACTIVE | COMMUNITY
Start: 2023-11-09

## 2023-11-21 ENCOUNTER — APPOINTMENT (OUTPATIENT)
Dept: ANTEPARTUM | Facility: CLINIC | Age: 23
End: 2023-11-21
Payer: MEDICAID

## 2023-11-21 ENCOUNTER — ASOB RESULT (OUTPATIENT)
Age: 23
End: 2023-11-21

## 2023-11-21 PROCEDURE — 76816 OB US FOLLOW-UP PER FETUS: CPT

## 2023-12-12 ENCOUNTER — APPOINTMENT (OUTPATIENT)
Dept: ANTEPARTUM | Facility: CLINIC | Age: 23
End: 2023-12-12
Payer: MEDICAID

## 2023-12-12 ENCOUNTER — ASOB RESULT (OUTPATIENT)
Age: 23
End: 2023-12-12

## 2023-12-12 ENCOUNTER — APPOINTMENT (OUTPATIENT)
Dept: MATERNAL FETAL MEDICINE | Facility: CLINIC | Age: 23
End: 2023-12-12

## 2023-12-12 PROCEDURE — 76816 OB US FOLLOW-UP PER FETUS: CPT | Mod: 59

## 2023-12-12 PROCEDURE — 76816 OB US FOLLOW-UP PER FETUS: CPT

## 2023-12-13 NOTE — DISCHARGE NOTE PROVIDER - YES NO FOR MLM POSITIVE OR NEGATIVE COVID RESULT
[facility] PEDIATRIC EMR DEPT  EMERGENCY DEPARTMENT ENCOUNTER      Pt Name: Carrie Spencer  MRN: [id number]  [address] [facility] 2012  Date of evaluation: 12/12/2023  Provider: Claudeen Pastures, MD    [address]       Chief Complaint   Patient presents with    Fever    Cough    Headache         HISTORY OF PRESENT ILLNESS   (Location/Symptom, Timing/Onset, Context/Setting, Quality, Duration, Modifying Factors, Severity)  Note limiting factors. Patient is a 13year-old who presents with 5 days of fever as well as cough and headache with fever. Patient was diagnosed with flu a and then was seen here few days ago and had a normal chest x-ray. Mom concerned because patient is having right-sided back pain especially with coughing. No vomiting. Patient took steroids after seeing the pediatrician last week. Mom states patient had a fever this morning the patient does not currently have a fever. No significant past medical history. No daily medication. Patient was also checked for strep throat and that was negative. The history is provided by the mother and the patient. Review of External Medical Records:     Nursing Notes were reviewed. REVIEW OF SYSTEMS    (2-9 systems for level 4, 10 or more for level 5)     Review of Systems    Except as noted above the remainder of the review of systems was reviewed and negative. PAST MEDICAL HISTORY   History reviewed. No pertinent past medical history. SURGICAL HISTORY     History reviewed. No pertinent surgical history.       CURRENT MEDICATIONS       Previous Medications    ALBUTEROL (PROVENTIL) (2.5 MG/3ML) 0.083% NEBULIZER SOLUTION    Take 3 mLs by nebulization every 6 hours as needed for Wheezing    ALBUTEROL SULFATE HFA (PROVENTIL;VENTOLIN;PROAIR) 108 (90 BASE) MCG/ACT INHALER    INHALE 2 (TWO) PUFFS EVERY 4-12 HOURS AS NEEDED    AMOXICILLIN (AMOXIL) 875 MG TABLET    Take 1 tablet by mouth 2 times daily for 10 days    PREDNISONE (DELTASONE) 10
,

## 2024-01-01 ENCOUNTER — APPOINTMENT (OUTPATIENT)
Dept: ANTEPARTUM | Facility: HOSPITAL | Age: 24
End: 2024-01-01
Payer: MEDICAID

## 2024-01-01 ENCOUNTER — INPATIENT (INPATIENT)
Facility: HOSPITAL | Age: 24
LOS: 1 days | Discharge: HOME | DRG: 832 | End: 2024-01-03
Attending: OBSTETRICS & GYNECOLOGY | Admitting: OBSTETRICS & GYNECOLOGY
Payer: COMMERCIAL

## 2024-01-01 VITALS — HEART RATE: 97 BPM | SYSTOLIC BLOOD PRESSURE: 90 MMHG | DIASTOLIC BLOOD PRESSURE: 53 MMHG | TEMPERATURE: 98 F

## 2024-01-01 DIAGNOSIS — D50.9 IRON DEFICIENCY ANEMIA, UNSPECIFIED: ICD-10-CM

## 2024-01-01 DIAGNOSIS — O26.899 OTHER SPECIFIED PREGNANCY RELATED CONDITIONS, UNSPECIFIED TRIMESTER: ICD-10-CM

## 2024-01-01 DIAGNOSIS — Z29.9 ENCOUNTER FOR PROPHYLACTIC MEASURES, UNSPECIFIED: ICD-10-CM

## 2024-01-01 DIAGNOSIS — O26.893 OTHER SPECIFIED PREGNANCY RELATED CONDITIONS, THIRD TRIMESTER: ICD-10-CM

## 2024-01-01 DIAGNOSIS — N39.0 URINARY TRACT INFECTION, SITE NOT SPECIFIED: ICD-10-CM

## 2024-01-01 DIAGNOSIS — O30.043 TWIN PREGNANCY, DICHORIONIC/DIAMNIOTIC, THIRD TRIMESTER: ICD-10-CM

## 2024-01-01 DIAGNOSIS — Z3A.29 29 WEEKS GESTATION OF PREGNANCY: ICD-10-CM

## 2024-01-01 LAB
ALBUMIN SERPL ELPH-MCNC: 3.1 G/DL — LOW (ref 3.3–5.2)
ALBUMIN SERPL ELPH-MCNC: 3.1 G/DL — LOW (ref 3.3–5.2)
ALP SERPL-CCNC: 105 U/L — SIGNIFICANT CHANGE UP (ref 40–120)
ALP SERPL-CCNC: 105 U/L — SIGNIFICANT CHANGE UP (ref 40–120)
ALT FLD-CCNC: 7 U/L — SIGNIFICANT CHANGE UP
ALT FLD-CCNC: 7 U/L — SIGNIFICANT CHANGE UP
ANION GAP SERPL CALC-SCNC: 11 MMOL/L — SIGNIFICANT CHANGE UP (ref 5–17)
ANION GAP SERPL CALC-SCNC: 11 MMOL/L — SIGNIFICANT CHANGE UP (ref 5–17)
APPEARANCE UR: ABNORMAL
APPEARANCE UR: ABNORMAL
APTT BLD: 28.1 SEC — SIGNIFICANT CHANGE UP (ref 24.5–35.6)
APTT BLD: 28.1 SEC — SIGNIFICANT CHANGE UP (ref 24.5–35.6)
AST SERPL-CCNC: 13 U/L — SIGNIFICANT CHANGE UP
AST SERPL-CCNC: 13 U/L — SIGNIFICANT CHANGE UP
BACTERIA # UR AUTO: ABNORMAL /HPF
BACTERIA # UR AUTO: ABNORMAL /HPF
BASOPHILS # BLD AUTO: 0.01 K/UL — SIGNIFICANT CHANGE UP (ref 0–0.2)
BASOPHILS # BLD AUTO: 0.01 K/UL — SIGNIFICANT CHANGE UP (ref 0–0.2)
BASOPHILS NFR BLD AUTO: 0.2 % — SIGNIFICANT CHANGE UP (ref 0–2)
BASOPHILS NFR BLD AUTO: 0.2 % — SIGNIFICANT CHANGE UP (ref 0–2)
BILIRUB SERPL-MCNC: <0.2 MG/DL — LOW (ref 0.4–2)
BILIRUB SERPL-MCNC: <0.2 MG/DL — LOW (ref 0.4–2)
BILIRUB UR-MCNC: ABNORMAL
BILIRUB UR-MCNC: ABNORMAL
BLD GP AB SCN SERPL QL: SIGNIFICANT CHANGE UP
BLD GP AB SCN SERPL QL: SIGNIFICANT CHANGE UP
BUN SERPL-MCNC: 6.1 MG/DL — LOW (ref 8–20)
BUN SERPL-MCNC: 6.1 MG/DL — LOW (ref 8–20)
CALCIUM SERPL-MCNC: 8.2 MG/DL — LOW (ref 8.4–10.5)
CALCIUM SERPL-MCNC: 8.2 MG/DL — LOW (ref 8.4–10.5)
CAST: 46 /LPF — HIGH (ref 0–4)
CAST: 46 /LPF — HIGH (ref 0–4)
CHLORIDE SERPL-SCNC: 104 MMOL/L — SIGNIFICANT CHANGE UP (ref 96–108)
CHLORIDE SERPL-SCNC: 104 MMOL/L — SIGNIFICANT CHANGE UP (ref 96–108)
CO2 SERPL-SCNC: 18 MMOL/L — LOW (ref 22–29)
CO2 SERPL-SCNC: 18 MMOL/L — LOW (ref 22–29)
COLOR SPEC: ABNORMAL
COLOR SPEC: ABNORMAL
CREAT SERPL-MCNC: 0.49 MG/DL — LOW (ref 0.5–1.3)
CREAT SERPL-MCNC: 0.49 MG/DL — LOW (ref 0.5–1.3)
DIFF PNL FLD: NEGATIVE — SIGNIFICANT CHANGE UP
DIFF PNL FLD: NEGATIVE — SIGNIFICANT CHANGE UP
EGFR: 134 ML/MIN/1.73M2 — SIGNIFICANT CHANGE UP
EGFR: 134 ML/MIN/1.73M2 — SIGNIFICANT CHANGE UP
EOSINOPHIL # BLD AUTO: 0.09 K/UL — SIGNIFICANT CHANGE UP (ref 0–0.5)
EOSINOPHIL # BLD AUTO: 0.09 K/UL — SIGNIFICANT CHANGE UP (ref 0–0.5)
EOSINOPHIL NFR BLD AUTO: 1.4 % — SIGNIFICANT CHANGE UP (ref 0–6)
EOSINOPHIL NFR BLD AUTO: 1.4 % — SIGNIFICANT CHANGE UP (ref 0–6)
FERRITIN SERPL-MCNC: 8 NG/ML — LOW (ref 15–150)
FERRITIN SERPL-MCNC: 8 NG/ML — LOW (ref 15–150)
GLUCOSE SERPL-MCNC: 128 MG/DL — HIGH (ref 70–99)
GLUCOSE SERPL-MCNC: 128 MG/DL — HIGH (ref 70–99)
GLUCOSE UR QL: 100 MG/DL
GLUCOSE UR QL: 100 MG/DL
HCT VFR BLD CALC: 25.3 % — LOW (ref 34.5–45)
HCT VFR BLD CALC: 25.3 % — LOW (ref 34.5–45)
HGB BLD-MCNC: 7.8 G/DL — LOW (ref 11.5–15.5)
HGB BLD-MCNC: 7.8 G/DL — LOW (ref 11.5–15.5)
IMM GRANULOCYTES NFR BLD AUTO: 0.6 % — SIGNIFICANT CHANGE UP (ref 0–0.9)
IMM GRANULOCYTES NFR BLD AUTO: 0.6 % — SIGNIFICANT CHANGE UP (ref 0–0.9)
INR BLD: 1.05 RATIO — SIGNIFICANT CHANGE UP (ref 0.85–1.18)
INR BLD: 1.05 RATIO — SIGNIFICANT CHANGE UP (ref 0.85–1.18)
IRON SATN MFR SERPL: 27 UG/DL — LOW (ref 37–145)
IRON SATN MFR SERPL: 27 UG/DL — LOW (ref 37–145)
IRON SATN MFR SERPL: 4 % — LOW (ref 14–50)
IRON SATN MFR SERPL: 4 % — LOW (ref 14–50)
KETONES UR-MCNC: 15 MG/DL
KETONES UR-MCNC: 15 MG/DL
LEUKOCYTE ESTERASE UR-ACNC: ABNORMAL
LEUKOCYTE ESTERASE UR-ACNC: ABNORMAL
LYMPHOCYTES # BLD AUTO: 1.52 K/UL — SIGNIFICANT CHANGE UP (ref 1–3.3)
LYMPHOCYTES # BLD AUTO: 1.52 K/UL — SIGNIFICANT CHANGE UP (ref 1–3.3)
LYMPHOCYTES # BLD AUTO: 24.2 % — SIGNIFICANT CHANGE UP (ref 13–44)
LYMPHOCYTES # BLD AUTO: 24.2 % — SIGNIFICANT CHANGE UP (ref 13–44)
MCHC RBC-ENTMCNC: 23.8 PG — LOW (ref 27–34)
MCHC RBC-ENTMCNC: 23.8 PG — LOW (ref 27–34)
MCHC RBC-ENTMCNC: 30.8 GM/DL — LOW (ref 32–36)
MCHC RBC-ENTMCNC: 30.8 GM/DL — LOW (ref 32–36)
MCV RBC AUTO: 77.1 FL — LOW (ref 80–100)
MCV RBC AUTO: 77.1 FL — LOW (ref 80–100)
MONOCYTES # BLD AUTO: 0.36 K/UL — SIGNIFICANT CHANGE UP (ref 0–0.9)
MONOCYTES # BLD AUTO: 0.36 K/UL — SIGNIFICANT CHANGE UP (ref 0–0.9)
MONOCYTES NFR BLD AUTO: 5.7 % — SIGNIFICANT CHANGE UP (ref 2–14)
MONOCYTES NFR BLD AUTO: 5.7 % — SIGNIFICANT CHANGE UP (ref 2–14)
MUCOUS THREADS # UR AUTO: PRESENT
MUCOUS THREADS # UR AUTO: PRESENT
NEUTROPHILS # BLD AUTO: 4.26 K/UL — SIGNIFICANT CHANGE UP (ref 1.8–7.4)
NEUTROPHILS # BLD AUTO: 4.26 K/UL — SIGNIFICANT CHANGE UP (ref 1.8–7.4)
NEUTROPHILS NFR BLD AUTO: 67.9 % — SIGNIFICANT CHANGE UP (ref 43–77)
NEUTROPHILS NFR BLD AUTO: 67.9 % — SIGNIFICANT CHANGE UP (ref 43–77)
NITRITE UR-MCNC: NEGATIVE — SIGNIFICANT CHANGE UP
NITRITE UR-MCNC: NEGATIVE — SIGNIFICANT CHANGE UP
PH UR: 6 — SIGNIFICANT CHANGE UP (ref 5–8)
PH UR: 6 — SIGNIFICANT CHANGE UP (ref 5–8)
PLATELET # BLD AUTO: 298 K/UL — SIGNIFICANT CHANGE UP (ref 150–400)
PLATELET # BLD AUTO: 298 K/UL — SIGNIFICANT CHANGE UP (ref 150–400)
POTASSIUM SERPL-MCNC: 3.7 MMOL/L — SIGNIFICANT CHANGE UP (ref 3.5–5.3)
POTASSIUM SERPL-MCNC: 3.7 MMOL/L — SIGNIFICANT CHANGE UP (ref 3.5–5.3)
POTASSIUM SERPL-SCNC: 3.7 MMOL/L — SIGNIFICANT CHANGE UP (ref 3.5–5.3)
POTASSIUM SERPL-SCNC: 3.7 MMOL/L — SIGNIFICANT CHANGE UP (ref 3.5–5.3)
PROT SERPL-MCNC: 6.3 G/DL — LOW (ref 6.6–8.7)
PROT SERPL-MCNC: 6.3 G/DL — LOW (ref 6.6–8.7)
PROT UR-MCNC: 100 MG/DL
PROT UR-MCNC: 100 MG/DL
PROTHROM AB SERPL-ACNC: 11.6 SEC — SIGNIFICANT CHANGE UP (ref 9.5–13)
PROTHROM AB SERPL-ACNC: 11.6 SEC — SIGNIFICANT CHANGE UP (ref 9.5–13)
RAPID RVP RESULT: SIGNIFICANT CHANGE UP
RAPID RVP RESULT: SIGNIFICANT CHANGE UP
RBC # BLD: 3.28 M/UL — LOW (ref 3.8–5.2)
RBC # BLD: 3.28 M/UL — LOW (ref 3.8–5.2)
RBC # FLD: 15.7 % — HIGH (ref 10.3–14.5)
RBC # FLD: 15.7 % — HIGH (ref 10.3–14.5)
RBC CASTS # UR COMP ASSIST: 4 /HPF — SIGNIFICANT CHANGE UP (ref 0–4)
RBC CASTS # UR COMP ASSIST: 4 /HPF — SIGNIFICANT CHANGE UP (ref 0–4)
SARS-COV-2 RNA SPEC QL NAA+PROBE: SIGNIFICANT CHANGE UP
SARS-COV-2 RNA SPEC QL NAA+PROBE: SIGNIFICANT CHANGE UP
SODIUM SERPL-SCNC: 133 MMOL/L — LOW (ref 135–145)
SODIUM SERPL-SCNC: 133 MMOL/L — LOW (ref 135–145)
SP GR SPEC: >1.03 — HIGH (ref 1–1.03)
SP GR SPEC: >1.03 — HIGH (ref 1–1.03)
SQUAMOUS # UR AUTO: 15 /HPF — HIGH (ref 0–5)
SQUAMOUS # UR AUTO: 15 /HPF — HIGH (ref 0–5)
TIBC SERPL-MCNC: 759 UG/DL — HIGH (ref 220–430)
TIBC SERPL-MCNC: 759 UG/DL — HIGH (ref 220–430)
TRANSFERRIN SERPL-MCNC: 531 MG/DL — HIGH (ref 192–382)
TRANSFERRIN SERPL-MCNC: 531 MG/DL — HIGH (ref 192–382)
UROBILINOGEN FLD QL: 1 MG/DL — SIGNIFICANT CHANGE UP (ref 0.2–1)
UROBILINOGEN FLD QL: 1 MG/DL — SIGNIFICANT CHANGE UP (ref 0.2–1)
WBC # BLD: 6.28 K/UL — SIGNIFICANT CHANGE UP (ref 3.8–10.5)
WBC # BLD: 6.28 K/UL — SIGNIFICANT CHANGE UP (ref 3.8–10.5)
WBC # FLD AUTO: 6.28 K/UL — SIGNIFICANT CHANGE UP (ref 3.8–10.5)
WBC # FLD AUTO: 6.28 K/UL — SIGNIFICANT CHANGE UP (ref 3.8–10.5)
WBC UR QL: 25 /HPF — HIGH (ref 0–5)
WBC UR QL: 25 /HPF — HIGH (ref 0–5)

## 2024-01-01 PROCEDURE — 76815 OB US LIMITED FETUS(S): CPT | Mod: 26

## 2024-01-01 PROCEDURE — ZZZZZ: CPT

## 2024-01-01 RX ORDER — SODIUM CHLORIDE 9 MG/ML
1000 INJECTION, SOLUTION INTRAVENOUS
Refills: 0 | Status: DISCONTINUED | OUTPATIENT
Start: 2024-01-01 | End: 2024-01-01

## 2024-01-01 RX ORDER — IRON SUCROSE 20 MG/ML
300 INJECTION, SOLUTION INTRAVENOUS ONCE
Refills: 0 | Status: COMPLETED | OUTPATIENT
Start: 2024-01-01 | End: 2024-01-01

## 2024-01-01 RX ORDER — SODIUM CHLORIDE 9 MG/ML
1000 INJECTION, SOLUTION INTRAVENOUS
Refills: 0 | Status: DISCONTINUED | OUTPATIENT
Start: 2024-01-01 | End: 2024-01-02

## 2024-01-01 RX ORDER — SODIUM CHLORIDE 9 MG/ML
1000 INJECTION, SOLUTION INTRAVENOUS ONCE
Refills: 0 | Status: COMPLETED | OUTPATIENT
Start: 2024-01-01 | End: 2024-01-01

## 2024-01-01 RX ORDER — CEFTRIAXONE 500 MG/1
1000 INJECTION, POWDER, FOR SOLUTION INTRAMUSCULAR; INTRAVENOUS EVERY 24 HOURS
Refills: 0 | Status: DISCONTINUED | OUTPATIENT
Start: 2024-01-01 | End: 2024-01-02

## 2024-01-01 RX ORDER — ACETAMINOPHEN 500 MG
1000 TABLET ORAL ONCE
Refills: 0 | Status: COMPLETED | OUTPATIENT
Start: 2024-01-01 | End: 2024-01-01

## 2024-01-01 RX ORDER — CEFTRIAXONE 500 MG/1
INJECTION, POWDER, FOR SOLUTION INTRAMUSCULAR; INTRAVENOUS
Refills: 0 | Status: DISCONTINUED | OUTPATIENT
Start: 2024-01-01 | End: 2024-01-01

## 2024-01-01 RX ADMIN — SODIUM CHLORIDE 125 MILLILITER(S): 9 INJECTION, SOLUTION INTRAVENOUS at 22:26

## 2024-01-01 RX ADMIN — SODIUM CHLORIDE 125 MILLILITER(S): 9 INJECTION, SOLUTION INTRAVENOUS at 13:45

## 2024-01-01 RX ADMIN — SODIUM CHLORIDE 1000 MILLILITER(S): 9 INJECTION, SOLUTION INTRAVENOUS at 12:45

## 2024-01-01 RX ADMIN — Medication 1000 MILLIGRAM(S): at 13:32

## 2024-01-01 RX ADMIN — Medication 400 MILLIGRAM(S): at 13:02

## 2024-01-01 RX ADMIN — IRON SUCROSE 176.67 MILLIGRAM(S): 20 INJECTION, SOLUTION INTRAVENOUS at 16:22

## 2024-01-01 RX ADMIN — CEFTRIAXONE 1000 MILLIGRAM(S): 500 INJECTION, POWDER, FOR SOLUTION INTRAMUSCULAR; INTRAVENOUS at 16:00

## 2024-01-01 NOTE — OB RN PATIENT PROFILE - FALL HARM RISK - UNIVERSAL INTERVENTIONS
Bed in lowest position, wheels locked, appropriate side rails in place/Call bell, personal items and telephone in reach/Instruct patient to call for assistance before getting out of bed or chair/Non-slip footwear when patient is out of bed/[place] to call system/Physically safe environment - no spills, clutter or unnecessary equipment/Purposeful Proactive Rounding/Room/bathroom lighting operational, light cord in reach Bed in lowest position, wheels locked, appropriate side rails in place/Call bell, personal items and telephone in reach/Instruct patient to call for assistance before getting out of bed or chair/Non-slip footwear when patient is out of bed/[place] to call system/Physically safe environment - no spills, clutter or unnecessary equipment/Purposeful Proactive Rounding/Room/bathroom lighting operational, light cord in reach

## 2024-01-01 NOTE — CONSULT NOTE ADULT - SUBJECTIVE AND OBJECTIVE BOX
ASHISH YANES  A 23year old  (Last Menstrual Period 2023 EDC 3/12/2024) at 29 weeks 5 days gestational age by first trimester ultrasound admitted to the antepartum service for a complicated UTI. HD#1      HPI:  Patient is a 23 y.,o.  at 29 weeks 5 days gestation with di-di twins who presents to L&D for concern for abdominal pain and malaise.   She reports onset of intermittent LLQ pain started yesterday and has been increasing in strength. She reports the pain wraps to her lower back. She reports associated dizziness and feeling unwell. She reports increase in urinary frequency over the past week. She reports feeling fatigued and weak for the past month. +FM, -LOF, - VB.   She reports feeling cold; she denies fevers, chills, nausea, vomiting, diarrhea, constipation, and sick contacts.    This pregnancy is complicated by:  - recurrent UTIs; patient noted to have e. Coli x3 (2 in the outpatient setting and 1 requiring hospitalization w/ MICU admission for pressors).   - di-di twin gestation  - anemia    PAST 24H:  Patient admitted; given tylenol, IVF, started on Rocephin.      SUBJECTIVE:  Patient reports mild relief with tylenol. +FM, - LOF, - VB.     REVIEW OF SYSTEMS:    CONSTITUTIONAL: + weakness. No fevers or chills  EYES/ENT: No visual changes;  No vertigo or throat pain   NECK: No pain or stiffness  RESPIRATORY: No cough, wheezing, hemoptysis; No shortness of breath  CARDIOVASCULAR: No chest pain or palpitations  GASTROINTESTINAL: + abdominal. No epigastric pain. No nausea, vomiting, or hematemesis; No diarrhea or constipation. No melena or hematochezia.  GENITOURINARY: No dysuria, frequency or hematuria  NEUROLOGICAL: No numbness or weakness  SKIN: No itching, burning, rashes, or lesions   All other review of systems is negative unless indicated above.    PAST MEDICAL & SURGICAL HISTORY:  Recurrent UTI  Urosepsis    No significant past surgical history        Allergies:  No Known Allergies      FAMILY HISTORY:      Social History: Denies ETOH, smoking and drugs. Lives with partner at home.     Past OB/GYN Hx:  - SAB, >10 years ago, [facility]  -  at 40 weeks, female, 8lbs, 2014, [place]  -  [facility] 40 weeks[facility]  -denies abnormal paps, STDs, ovarian cysts, fibroids. Menarche age 12, menses q28d for 5Vital Signs:      Vital Signs Last 24 Hrs  T(C): 36.5 (2024 13:44), Max: 36.5 (2024 12:30)  T(F): 97.7 (2024 13:44), Max: 97.7 (2024 12:30)  HR: 85 (2024 13:49) (85 - 97)  BP: 96/61 (2024 13:49) (90/53 - 96/61)  RR: 16 (2024 13:44) (16 - 16)      Physical Exam:  General: Adult female, ill-appearing  Head/Neck: No neck masses, no lymphadenopathy  CVS: RRR, +S1/S2, no murmurs  Lungs: CTAB, no wheezing, rhonchi or rales  Abdomen: soft, gravid uterus, tenderness in LLQ on papation  Pelvic: Deferred  Back: No CVA tenderness  Ext: No cyanosis, edema or calf tenderness  Skin: No rashes or lesions on exposed skin  Neuro: Normal DTRs, grossly intact  FHT A: 135 bpm, moderate variability, + accels, - decels   FHT B: 145 bpm, moderate variability, + accels, - decels   [place]: none  Bedside US: Twin A MVP 4.41, inferior vertex, posterior placenta, EIF noted  Twin B MVP 6.1, superior breech, anterior placenta    Labs:                          7.8    6.28  )-----------( 298      ( 2024 13:00 )             25.3         133<L>  |  104  |  6.1<L>  ----------------------------<  128<H>  3.7   |  18.0<L>  |  0.49<L>    Ca    8.2<L>      2024 13:00    TPro  6.3<L>  /  Alb  3.1<L>  /  TBili  <0.2<L>  /  DBili  x   /  AST  13  /  ALT  7   /  AlkPhos  105      PT/INR - ( 2024 13:00 )   PT: 11.6 sec;   INR: 1.05 ratio         PTT - ( 2024 13:00 )  PTT:28.1 sec            MEDICATIONS  (STANDING):  cefTRIAXone Injectable. 1000 milliGRAM(s) IV Push every 24 hours  iron sucrose IVPB 300 milliGRAM(s) IV Intermittent once  lactated ringers Bolus 1000 milliLiter(s) IV Bolus once  lactated ringers. 1000 milliLiter(s) (125 mL/Hr) IV Continuous <Continuous>  prenatal multivitamin 1 Tablet(s) Oral daily    MEDICATIONS  (PRN):   ASHISH YANES  A 23year old  (Last Menstrual Period 2023 EDC 3/12/2024) at 29 weeks 5 days gestational age by first trimester ultrasound admitted to the antepartum service for a complicated UTI. HD#1      HPI:  Patient is a 23 y.,o.  at 29 weeks 5 days gestation with di-di twins who presents to L&D for concern for abdominal pain and malaise.   She reports onset of intermittent LLQ pain started yesterday and has been increasing in strength. She reports the pain wraps to her lower back. She reports associated dizziness and feeling unwell. She reports increase in urinary frequency over the past week. She reports feeling fatigued and weak for the past month. +FM, -LOF, - VB.   She reports feeling cold; she denies fevers, chills, nausea, vomiting, diarrhea, constipation, and sick contacts.    This pregnancy is complicated by:  - recurrent UTIs; patient noted to have e. Coli x3 (2 in the outpatient setting and 1 requiring hospitalization w/ MICU admission for pressors).   - di-di twin gestation  - anemia    PAST 24H:  Patient admitted; given tylenol, IVF, started on Rocephin.      SUBJECTIVE:  Patient reports mild relief with tylenol. +FM, - LOF, - VB.     REVIEW OF SYSTEMS:    CONSTITUTIONAL: + weakness. No fevers or chills  EYES/ENT: No visual changes;  No vertigo or throat pain   NECK: No pain or stiffness  RESPIRATORY: No cough, wheezing, hemoptysis; No shortness of breath  CARDIOVASCULAR: No chest pain or palpitations  GASTROINTESTINAL: + abdominal. No epigastric pain. No nausea, vomiting, or hematemesis; No diarrhea or constipation. No melena or hematochezia.  GENITOURINARY: No dysuria, frequency or hematuria  NEUROLOGICAL: No numbness or weakness  SKIN: No itching, burning, rashes, or lesions   All other review of systems is negative unless indicated above.    PAST MEDICAL & SURGICAL HISTORY:  Recurrent UTI  Urosepsis    No significant past surgical history        Allergies:  No Known Allergies      FAMILY HISTORY:      Social History: Denies ETOH, smoking and drugs. Lives with partner at home.     Past OB/GYN Hx:  - SAB, >10 years ago, [facility]  -  at 40 weeks, female, 8lbs, 2014, [place]  -  [facility] 40 weeks[facility]  -denies abnormal paps, STDs, ovarian cysts, fibroids. Menarche age 12, menses q28d for 5Vital Signs:      Vital Signs Last 24 Hrs  T(C): 36.5 (2024 13:44), Max: 36.5 (2024 12:30)  T(F): 97.7 (2024 13:44), Max: 97.7 (2024 12:30)  HR: 85 (2024 13:49) (85 - 97)  BP: 96/61 (2024 13:49) (90/53 - 96/61)  RR: 16 (2024 13:44) (16 - 16)      Physical Exam:  General: Adult female, ill-appearing  Head/Neck: No neck masses, no lymphadenopathy  CVS: RRR, +S1/S2, no murmurs  Lungs: CTAB, no wheezing, rhonchi or rales  Abdomen: soft, gravid uterus, tenderness in LLQ on papation  Pelvic: Deferred  Back: No CVA tenderness  Ext: No cyanosis, edema or calf tenderness  Skin: No rashes or lesions on exposed skin  Neuro: Normal DTRs, grossly intact  FHT A: 135 bpm, moderate variability, + accels, - decels   FHT B: 145 bpm, moderate variability, + accels, - decels   [place]: none  Bedside US: Twin A MVP 4.41, inferior vertex, posterior placenta, EIF noted  Twin B MVP 6.1, superior breech, anterior placenta    Labs:                          7.8    6.28  )-----------( 298      ( 2024 13:00 )             25.3         133<L>  |  104  |  6.1<L>  ----------------------------<  128<H>  3.7   |  18.0<L>  |  0.49<L>    Ca    8.2<L>      2024 13:00    TPro  6.3<L>  /  Alb  3.1<L>  /  TBili  <0.2<L>  /  DBili  x   /  AST  13  /  ALT  7   /  AlkPhos  105      PT/INR - ( 2024 13:00 )   PT: 11.6 sec;   INR: 1.05 ratio         PTT - ( 2024 13:00 )  PTT:28.1 sec            MEDICATIONS  (STANDING):  cefTRIAXone Injectable. 1000 milliGRAM(s) IV Push every 24 hours  iron sucrose IVPB 300 milliGRAM(s) IV Intermittent once  lactated ringers Bolus 1000 milliLiter(s) IV Bolus once  lactated ringers. 1000 milliLiter(s) (125 mL/Hr) IV Continuous <Continuous>  prenatal multivitamin 1 Tablet(s) Oral daily    MEDICATIONS  (PRN):

## 2024-01-01 NOTE — OB PROVIDER H&P - ASSESSMENT
23 y.o.  at 29 weeks 5 days gestation with di-di twins admitted to the antepartum service for complicated cystitis. Cat 2 FHT for fetal tachycardia of Fetus B.     - consent  - admission labs  - IV hydration  - continuous toco and fetal heart monitoring  - GBS unknown  - pain management: IV tylenol  - MFM consult    Discussed with Dr. Menon

## 2024-01-01 NOTE — CONSULT NOTE ADULT - PROBLEM SELECTOR RECOMMENDATION 4
Patient noted to have microcytic anemia on presentation, newly microcytic from lab work from last hospitalization in October. Will perform iron studies.

## 2024-01-01 NOTE — OB PROVIDER H&P - HISTORY OF PRESENT ILLNESS
Patient is a 23 y.,o.  at 29 weeks 5 days gestation with di-di twins who presents to L&D for concern for abdominal pain and malaise.   She reports onset of intermittent LLQ pain started yesterday and has been increasing in strength. She reports the pain wraps to her lower back. She reports associated dizziness and feeling unwell. She reports increase in urinary frequency over the past week. She reports feeling fatigued and weak for the past month. +FM, -LOF, - VB.   She reports feeling cold; she denies fevers, chills, nausea, vomiting, diarrhea, constipation, and sick contacts.     This pregnancy is complicated by:  - recurrent UTIs; patient noted to have e. Coli x3 (2 in the outpatient setting and 1 requiring hospitalization w/ MICU admission for pressors).   - di-di twin gestation  - anemia    OBHx:  - [facility]AB, >10 [facility] ago, [facility]  -  at 40 weeks, female, 8lbs, 2014, [facility]  -  at 40 weeks, male, 9lbs, 2019, [facility]  GynHx: denies abnormal paps, STDs, ovarian cysts, fibroids. Menarche age 12, menses q28d for 5 days.   PMH: denies  PSH: denies  Meds: PNV  All: NKDA  SocialHx: denies alcohol, tobacco, and drug use. Lives with partner, unmarried.  Patient is a 23 y.,o.  at 29 weeks 5 days gestation with di-di twins who presents to L&D for concern for abdominal pain and malaise.   She reports onset of intermittent LLQ pain started yesterday and has been increasing in strength. She reports the pain wraps to her lower back. She reports associated dizziness and feeling unwell. She reports increase in urinary frequency over the past week. She reports feeling fatigued and weak for the past month. +FM, -LOF, - VB.   She reports feeling cold; she denies fevers, chills, nausea, vomiting, diarrhea, constipation, and sick contacts.     This pregnancy is complicated by:  - recurrent UTIs; patient noted to have e. Coli x3 (2 in the outpatient setting and 1 requiring hospitalization w/ MICU admission for pressors).   - di-di twin gestation  - anemia    [facility]:  [facility]  -  [facility] 40 weeks, [facility]  -  at 40 weeks, [facility], 9lbs[facility]  GynHx: denies abnormal paps, STDs, ovarian cysts, fibroids. Menarche age 12, menses q28d for 5 days.   PMH: denies  PSH: denies  Meds: PNV  All: NKDA  SocialHx: denies alcohol, tobacco, and drug use. Lives with partner, unmarried.

## 2024-01-01 NOTE — OB PROVIDER H&P - NSHPPHYSICALEXAM_GEN_ALL_CORE
Vital Signs Last 24 Hrs  T(C): 36.5 (01 Jan 2024 13:44), Max: 36.5 (01 Jan 2024 12:30)  T(F): 97.7 (01 Jan 2024 13:44), Max: 97.7 (01 Jan 2024 12:30)  HR: 85 (01 Jan 2024 13:49) (85 - 97)  BP: 96/61 (01 Jan 2024 13:49) (90/53 - 96/61)  RR: 16 (01 Jan 2024 13:44) (16 - 16)    Gen: appears unwell  Card: RRR  Pulm: CTAB  Abd: gravid, tenderness in LLQ on palpation  Back: non-tender, no CVA tenderness  Ext: no edema, non tender  FHT A: 160 bpm, moderate variability, + accels, - decels   FHT B: 155 bpm, moderate variability, + accels, - decels   [place]: none Vital Signs Last 24 Hrs  T(C): 36.5 (01 Jan 2024 13:44), Max: 36.5 (01 Jan 2024 12:30)  T(F): 97.7 (01 Jan 2024 13:44), Max: 97.7 (01 Jan 2024 12:30)  HR: 85 (01 Jan 2024 13:49) (85 - 97)  BP: 96/61 (01 Jan 2024 13:49) (90/53 - 96/61)  RR: 16 (01 Jan 2024 13:44) (16 - 16)    Gen: appears unwell  Card: RRR  Pulm: CTAB  Abd: gravid, tenderness in LLQ on palpation  Back: non-tender, no CVA tenderness  Ext: no edema, non tender  FHT A: 160 bpm, moderate variability, + accels, - decels   FHT B: 155 bpm, moderate variability, + accels, - decels   [place]: none

## 2024-01-01 NOTE — OB RN TRIAGE NOTE - FALL HARM RISK - UNIVERSAL INTERVENTIONS
Bed in lowest position, wheels locked, appropriate side rails in place/Call bell, personal items and telephone in reach/Instruct patient to call for assistance before getting out of bed or chair/Non-slip footwear when patient is out of bed/[place] to call system/Physically safe environment - no spills, clutter or unnecessary equipment/Purposeful Proactive Rounding/Room/bathroom lighting operational, light cord in reach Bed in lowest position, wheels locked, appropriate side rails in place/Call bell, personal items and telephone in reach/Instruct patient to call for assistance before getting out of bed or chair/Non-slip footwear when patient is out of bed/[place] to call system/Physically safe environment - no spills, clutter or unnecessary equipment/Purposeful Proactive Rounding/Room/bathroom lighting operational, light cord in reach

## 2024-01-01 NOTE — CONSULT NOTE ADULT - PROBLEM SELECTOR RECOMMENDATION 9
Patient noted to have a history of recurrent UTIs; 3 UTIs in this pregnancy with last one requiring a MICU admission for pressor support; patient discharged on antibiotics however did not receive suppressive therapy outpatient. Patient represents with symptoms of cystitis, low suspicion for pyelonephritis at this time but high suspicion of this developing. Patient is ill-appearing, however afebrile. Plan to start rocephin as patient has had 3 UTIs that are susceptible; will transition to suppressive therapy during admission. Patient given 1L IVP, maintenance at 125cc/hr. Tylenol ordered for pain control.

## 2024-01-01 NOTE — OB PROVIDER H&P - NSRISKFACTORS_OBGYN_ALL_OB
6/25/2018    Patito Mead  1981  [id number]    Discussion and Plan    Etiologies of the CT scan findings are reviewed with patient and wife  This may be attributable either to an impacted calculus fragment versus stricture within the mid ureter  Given the persistence of symptoms, I have advised cystoscopy, right retrograde pyelogram and right ureteroscopy with possible holmium laser incision of a stricture of present  The risks including bleeding, infection, ureteral injury, need for secondary procedures explained  Consent obtained at this time  1  Hydronephrosis of right kidney  - Case request operating room: URETEROSCOPY, retrograde, homium laser stent; Standing  - Case request operating room: URETEROSCOPY, retrograde, homium laser stent    2  Calculus of ureter      Assessment      Patient Active Problem List   Diagnosis    Calculus of ureter    Hydronephrosis of right kidney       History of Present Illness    Diamond Call is a 43 y o  male seen today in regards to a history of fairly extensive history of renal lithiasis on the right side  He had a stent removed and his previous ultrasound showed severe right hydronephrosis  Subsequently developed flank pain  He was thought to perhaps have a distal stone versus a ureteral stricture and had been advised to consider repeat ureteroscopy  Patient presents today for further evaluation now having passed a stone approximately 2 weeks ago  He notes only intermittent right flank discomfort  No fever or chills  Denies any urinary complaints other than nocturia x2  No gross hematuria  There is a strong family history of nephrolithiasis  Prior stones were calcium oxalate  A CT scan was obtained which in fact confirms moderate hydronephrosis to the level of the mid ureter where there is tapering and a punctate calcification seen  Distal ureter otherwise appears normal   He continues to have persistent mild right flank pain      Urinary Symptom Assessment        Past Medical History  Past Medical History:   Diagnosis Date    Chronic kidney disease     KIDNEY STONES    Kidney stone        Past Social History  Past Surgical History:   Procedure Laterality Date    CYSTOSCOPY W/ LASER LITHOTRIPSY Right 8/24/2017    Procedure: CYSTOSCOPY URETEROSCOPY WITH LITHOTRIPSY HOLMIUM LASER, RETROGRADE PYELOGRAM AND INSERTION STENT URETERAL;  Surgeon: Sly Lambert MD;  Location: [facility];  Service: Urology    CYSTOSCOPY W/ LASER LITHOTRIPSY Right 9/14/2017    Procedure: CYSTOSCOPY; RETROGRADE PYELOGRAM; URETEROSCOPY WITH HOLMIUM LASER; stone extraction ,EXCHANGE OF RIGHT URETERAL STENT;  Surgeon: Sly Lambert MD;  Location: [facility];  Service: Urology    EXTRACORPOREAL SHOCK WAVE LITHOTRIPSY Right 8/10/2017    Procedure: Miles Rai SHOCKWAVE (ESWL) WITH CYSTOSCOPY AND INSERTION STENT URETERAL;  Surgeon: Sly Lambert MD;  Location: [facility];  Service: Urology    CA CYSTOURETHROSCOPY,URETER CATHETER Right 11/9/2017    Procedure: CYSTOSCOPY RETROGRADE PYELOGRAM WITH URETEROSCOPY WITH LASER AND RIGHT STENT EXCHANGE;  Surgeon: Sly Lambert MD;  [facility]: [facility];  Service: Urology    CA CYSTOURETHROSCOPY,URETER CATHETER N/A 1/4/2018    Procedure: CYSTOSCOPY, BILATERAL RETROGRADE PYELOGRAM WITH RIGHT URETEROSCOPY WITH HYDRODILATION OF STRICTURE , RIGHT STENT INSERTION X 2;  Surgeon: Sly Lambert MD;  Location: [facility];  Service: Urology       Past Family History  Family History   Problem Relation Age of Onset    Kidney disease Mother     Diabetes Mother     Hypertension Mother     Urolithiasis Mother     No Known Problems Father        Past Social history  Social History     Social History    Marital status: /Civil Union     Spouse name: N/A    Number of children: N/A    Years of education: N/A     Occupational History    Warehouse      Social History Main Topics    Smoking status: Light Tobacco Smoker Types: Cigars    Smokeless tobacco: Never Used      Comment: CIGAR - 1-2 per month    Alcohol use 4 2 oz/week     7 Cans of beer per week      Comment: occ   Drug use: No    Sexual activity: Not on file     Other Topics Concern    Not on file     Social History Narrative    No narrative on file       Current Medications  Current Outpatient Prescriptions   Medication Sig Dispense Refill    IBUPROFEN ADVANCED FORMULA PO Take by mouth      oxyCODONE-acetaminophen (PERCOCET) 5-325 mg per tablet Take 1 tablet by mouth every 4 (four) hours as needed for moderate pain Max Daily Amount: 6 tablets 15 tablet 0    tamsulosin (FLOMAX) 0 4 mg Take 1 capsule (0 4 mg total) by mouth daily with dinner 15 capsule 0     No current facility-administered medications for this visit  Allergies  No Known Allergies    Past Medical History, Social History, Family History, medications and allergies were reviewed  Review of Systems  Review of Systems   Constitutional: Negative  HENT: Negative  Eyes: Negative  Respiratory: Negative  Cardiovascular: Negative  Gastrointestinal: Negative  Endocrine: Negative  Genitourinary: Positive for flank pain  Negative for decreased urine volume, difficulty urinating, frequency, hematuria and urgency  Skin: Negative  Neurological: Negative  Hematological: Negative  Psychiatric/Behavioral: Negative  Vitals  Vitals:    06/25/18 1007   BP: 118/80   BP Location: Right arm   Patient Position: Sitting   Cuff Size: Adult   Pulse: 74   Weight: 95 1 kg (209 lb 9 6 oz)   Height: 5' 9" (1 753 m)         Physical Exam    Physical Exam   Constitutional: He is oriented to person, place, and time  He appears well-developed and well-nourished  HENT:   Head: Normocephalic and atraumatic  Eyes: Pupils are equal, round, and reactive to light  Neck: Normal range of motion  Cardiovascular: Normal rate, regular rhythm and normal heart sounds      Pulmonary/Chest: Effort normal and breath sounds normal  No accessory muscle usage  No respiratory distress  Abdominal: Soft  Normal appearance and bowel sounds are normal  There is no tenderness  Musculoskeletal: Normal range of motion  Neurological: He is alert and oriented to person, place, and time  Skin: Skin is warm, dry and intact  Psychiatric: He has a normal mood and affect  His speech is normal  Cognition and memory are normal    Nursing note and vitals reviewed  Results    Below listed labs, pathology results, and radiology images were personally reviewed:    No results found for: PSA  Lab Results   Component Value Date    GLUCOSE 84 10/27/2017    CALCIUM 9 0 05/05/2018     05/05/2018    K 4 1 05/05/2018    CO2 27 05/05/2018     05/05/2018    BUN 16 05/05/2018    CREATININE 1 11 05/05/2018     Lab Results   Component Value Date    WBC 7 57 10/27/2017    HGB 16 2 10/27/2017    HCT 46 5 10/27/2017    MCV 89 10/27/2017     10/27/2017       No results found for this or any previous visit (from the past 1 hour(s)) ]    CT ABDOMEN AND PELVIS WITH AND WITHOUT IV CONTRAST     INDICATION:   N20 1: Calculus of ureter  N13 30: Unspecified hydronephrosis  Right flank pain  History of lithotripsy      COMPARISON:  None      TECHNIQUE: CT of the kidneys was performed without intravenous contrast   Dynamic postcontrast CT evaluation of the abdomen and pelvis was performed in both nephrographic and delayed phases after the administration of intravenous contrast   Axial,   sagittal, and coronal 2D reformatted images were created from the source data and submitted for interpretation       Radiation dose length product (DLP) for this visit:  3342 94 mGy-cm     This examination, like all CT scans performed in the [facility], was performed utilizing techniques to minimize radiation dose exposure, including the use of   iterative reconstruction and automated exposure control      IV Contrast: 120 mL of iohexol (OMNIPAQUE)  Enteric Contrast:  Enteric contrast was not administered      FINDINGS:     ABDOMEN     RIGHT KIDNEY AND URETER:  No solid renal mass  No detectable urothelial mass  Moderate hydronephrosis with dilatation and blunting of the calyces  Ureter measuring 7 mm in diameter  There is significant interval reduction in the volume of right renal calculi  The only residual focus in the kidney is seen dependent within the   renal pelvis and measures 2 x 2 mm  An additional punctate calcific focus is also noted in the right mid ureter at the level of L5  Delayed excretion views show incomplete opacification of the distal half of the right ureter but without suspicious dilatation or thickening through this region  The degree of hydronephrosis on review of multiple recent prior ultrasounds appears to be   variable and is presumably chronic, a sequela of prior obstruction  No obstructing abnormality identified at this time      LEFT KIDNEY AND URETER:  No solid renal mass  No detectable urothelial mass  No hydronephrosis or hydroureter  9 mm left upper pole cyst   Nonobstructing mid upper 3 mm calculus, stable  Punctate nonobstructing calculus in the lower pole anteriorly  Also stable  No new calculi        URINARY BLADDER:  No bladder wall mass  No calculi         LOWER CHEST:  No clinically significant abnormality identified in the visualized lower chest      LIVER/BILIARY TREE:  Unremarkable      GALLBLADDER:  No calcified gallstones  No pericholecystic inflammatory change      SPLEEN:  Unremarkable      PANCREAS:  Unremarkable      ADRENAL GLANDS:  Unremarkable      STOMACH AND BOWEL:  Unremarkable      ABDOMINOPELVIC CAVITY:  No ascites  No free intraperitoneal air   No lymphadenopathy      VESSELS:  Unremarkable for patient's age      PELVIS     REPRODUCTIVE ORGANS:  Unremarkable for patient's age      APPENDIX: No findings to suggest appendicitis      ABDOMINAL WALL/INGUINAL REGIONS:  Unremarkable      OSSEOUS STRUCTURES:  No acute fracture or destructive osseous lesion      IMPRESSION:     Persistent moderate right-sided hydronephrosis as detailed above  No acute obstructing abnormality identified  Not significant with altered compared to multiple prior ultrasounds where it appears to be varying in degrees and may be dilated as a result   of prior obstruction      Tiny residual calculi seen within the right collecting system as above  No new stones  Stable nonobstructing left nephrolithiasis          Workstation performed: [id number]      Imaging     CT abdomen pelvis w wo contrast (Order #[id number]) on 6/15/2018 - Imaging Information Yes

## 2024-01-01 NOTE — CONSULT NOTE ADULT - ASSESSMENT
23year old  (Last Menstrual Period 2023 EDC 3/12/2024) at 29 weeks 5 days gestational age by first trimester ultrasound admitted to the antepartum service for a complicated UTI. HD#1

## 2024-01-01 NOTE — OB RN PATIENT PROFILE - FUNCTIONAL SCREEN CURRENT LEVEL: SWALLOWING (IF SCORE 2 OR MORE FOR ANY ITEM, CONSULT REHAB SERVICES), MLM)
Initial SW/CM Assessment/Plan of Care Note     Baseline Assessment  48 year old admitted 10/26/2023 as Inpatient with a diagnosis of acute coronary syndrome.   Prior to admission patient was living with Spouse, Children and residing at House .  Patient’s Primary Care Provider is Haylie Cisneros MD.     Progress Note  CM reviewed chart. CM met with patient and wife to explain role and discuss dc plans.Patient lives with wife in a house.Admitted for acute coronary syndrome.S/P cardiac cath/stent.Plan dc home.CM to follow for dc needs.    Plan  Patient/Family Discharge Goal: Home   Is patient/family goal achievable: Yes    SW/CM - Recommendations for Discharge: Home   PT - Recommendations for Discharge:      Last Filed Values     None        OT - Recommendations for Discharge:      Last Filed Values     None        SLP - Recommendations for Discharge:    Last Filed Values     None          Barriers to Discharge  Identified Barriers to Discharge/Transition Planning: medical clearance  Anticipate patient will not need post-hospital services. Necessary services are available.  Anticipate patient can return to the environment from which patient entered the hospital.   Anticipate patient can provide self-care at discharge.    Refer to SW/CM Flowsheet for objective data.     Medical History  No past medical history on file.    Prior to Admission Status  Functional Status  Ambulation: Independent/Self  Bathing: Independent/Self  Dressing: Independent/Self  Toileting: Independent/Self  Meal Preparation: Independent/Self    Agency/Support  Type of Services Prior to Hospitalization: None   Support Systems: Spouse   Home Devices/Equipment: None   Mobility Assist Devices: None  Sensory Support Devices: None      Current Status  PT Ambulation Tips:    PT Transfer Tips:     OT Bathing Tips:    OT Dressing Tips:    OT Toileting Tips:    OT Feeding Tips:    SLP Swallow/Feeding Tips:    SLP Comm/Cog Tips:    Current Mental Status:  Alert, Oriented to Person, Oriented to Place, Oriented to Reason for Hospitalization, Oriented to Time  Stressors:      Insurance  Primary: [facility]IDIAN  Secondary: N/A    Disposition Recommendations:  MIKE/HOWARD recommendation for discharge: Home          0 = swallows foods/liquids without difficulty

## 2024-01-01 NOTE — OB RN TRIAGE NOTE - NS_FETALMOVEMENT_OBGYN_ALL_OB
HEMATOLOGY/ONCOLOGY  FOLLOW UP VISIT       Patient: Swati Hoskins Date: 2021   : 1977 Attending: Kat Crenshaw PA-C   44 year old female PCP: Jo Ann Mora MD        History of Present Illness   Chief Complaint:   Chief Complaint   Patient presents with   • Breast Cancer        Swati Hoskins   Presents for follow up prior to receiving her cycle #2 of adjuvant chemotherapy treatment with dose dense doxorubicin/cyclophosphamide.  She is accompanied by her son who assists with interpretation.  She notes she is doing very well. She saw cardiology yesterday and will see Dr. Montague in follow up next week. She states her metallic taste has resolved. She denies fevers, chills, dyspnea, cough, nausea, emesis, diarrhea, constipation, peripheral neuropathy, mucositis, or pain. She is undergoing lymphedema therapy.  She reports no complaints today.    Review of Systems   Constitutional: Negative for appetite change and fever.   Respiratory: Negative for shortness of breath.    Gastrointestinal: Negative for constipation, diarrhea, nausea and vomiting.   Genitourinary: Negative for difficulty urinating.    Musculoskeletal: Negative for arthralgias.   Psychiatric/Behavioral: Negative for depression. The patient is not nervous/anxious.     Further complete 10 point review of system is negative aside from that mentioned in the history of present illness above.    Oncologic History    Cancer Staging  Malignant neoplasm of upper-outer quadrant of left breast in female, estrogen receptor positive (CMS/HCC)  Staging form: Breast, AJCC 8th Edition  - Clinical stage from 2021: cT2, cN1, cM0, ER+, KY+, HER2- - Signed by Hailey Horta MD on 2021  - Pathologic: pT2, pN2, cM0, ER+, KY+, HER2- - Signed by Hailey Horta MD on 2021 bilateral screening mammogram: Indeterminate 2.6 cm mass at 2 o'clock position left breast.  Indeterminate left axillary lymph nodes.  Indeterminate  focal asymmetry upper outer right breast, middle depth.  2021 unilateral right digital diagnostic mammogram: No mammographic evidence of malignancy.  2021 ultrasound bilateral breasts: 2.1 cm mass 2:00 left breast.  Indeterminate left axillary lymph node.  2021 biopsy left breast and left axilla.  Pathology, left breast 2:00: Invasive lobular carcinoma, estrogen receptor 100% strong, progesterone receptor 90% strong, HER-2/mega IHC 0-negative.  Pathology, left axilla: Metastatic carcinoma.  2021 left mastectomy and sentinel lymph node biopsy.  Pathology, left modified radical mastectomy: Invasive lobular carcinoma, multiple foci, the largest approximately 4.1 cm in greatest dimension.  Surgical margins negative for tumor.  Pathology, sentinel lymph node #1 left axilla: Lymph node with metastatic lobular carcinoma.  Pathology, left axilla positive lymph node with Claudine clip: Metastatic lobular carcinoma to 3 of 3 lymph nodes.  Positive for extracapsular extension.    Past History     Past Medical History:   Diagnosis Date   • High cholesterol    • Invasive lobular carcinoma of breast in female (CMS/HCC) 2021    Left   • Menstrual disorder     Heavy     Past Surgical History:   Procedure Laterality Date   • Breast biopsy Left 2021   •  section, classic     • Mastectomy Left      Family History   Problem Relation Age of Onset   • Cancer Maternal Aunt    Lung cancer    Social History     Tobacco Use   • Smoking status: Never Smoker   • Smokeless tobacco: Never Used   Vaping Use   • Vaping Use: never used   Substance Use Topics   • Alcohol use: Never   • Drug use: Never     Resides In: [address] [place] [address] [address]  [facility] [address]  Martial Status:  /Civil Union [2]  Number of Children:  2  Occupation:  JANITORIAL   Advanced Directives:   Code Status: Prior  Current Medications    ALPRAZOLAM (XANAX) 0.25 MG TABLET    Take 0.25 mg by mouth 2 times daily as needed.    DEXAMETHASONE  (DECADRON) 4 MG TABLET    Take 2 tablets by mouth daily. Start the day after chemotherapy for 3 days.    HYDROCODONE-ACETAMINOPHEN (NORCO) 5-325 MG PER TABLET    Take 1 tablet by mouth every 6 hours as needed for Pain (moderate to severe pain only).    OLANZAPINE (ZYPREXA) 5 MG TABLET    Take 1 tablet by mouth nightly. Start the day of chemotherapy x 4 days for prevention of nausea/vomiting.    ONDANSETRON (ZOFRAN ODT) 8 MG DISINTEGRATING TABLET    Place 1 tablet onto the tongue every 8 hours as needed for Nausea.    PEGFILGRASTIM (NEULASTA ONPRO) 6 MG/0.6ML ON-BODY INJECTOR    Inject 0.6 mLs into the skin every 14 days. Place device at the completion of chemotherapy, device will self-inject 27 hours once activated.     ALLERGIES:  No Known Allergies   Physical Exam   ECOG Performance Status:   ECOG [08/12/21 1100]   ECOG Performance Status 0     Oncology Encounter Vitals [08/12/21 1107]   ONC OP Encounter Vitals Group      /77      Heart Rate 92      Resp 16      Temp 97.3 °F (36.3 °C)      Temp src Oral      SpO2 96 %      Weight 152 lb 7.2 oz (69.2 kg)      Height       Pain Score  0      Pain Location       Pain Education?       BSA (Calculated - m2) - [employer]       BMI (Calculated)      Physical Exam   General Appearance: Alert, cooperative, no apparent distress.  HEENT: Sclera anicteric.  Face covering present. No oral lesions.  Neck: No JVD.    Lymph Nodes: No palpable cervical, supraclavicular, and axillary nodes.   Breast: Left mastectomy site healing well.  Steri strips still present in the left axillae. No erythema or drainage.  Lungs: Respirations unlabored. Clear to auscultation bilaterally,   Heart: Regular rate and rhythm,  no murmur.  No pedal edema.  Abdomen: Soft, non-tender, nondistended.    Extremities:  No calf tenderness.    Neurologic: Alert, oriented.  No gross motor or sensory deficits.  Psych:  Mood and affect appropriate.     Recent PHQ 2/9 Score    PHQ 2:  Date Adult PHQ 2  Score Adult PHQ 2 Interpretation   8/12/2021 0 No further screening needed       PHQ 9:  Date Adult PHQ 9 Score Adult PHQ 9 Interpretation   8/3/2021 3 Minimal Depression       Lab Results     Lab Services on 08/12/2021   Component Date Value Ref Range Status   • Sodium 08/12/2021 140  135 - 145 mmol/L Final   • Potassium 08/12/2021 4.3  3.4 - 5.1 mmol/L Final   • Chloride 08/12/2021 105  98 - 107 mmol/L Final   • Carbon Dioxide 08/12/2021 28  21 - 32 mmol/L Final   • Anion Gap 08/12/2021 11  10 - 20 mmol/L Final   • Glucose 08/12/2021 92  65 - 99 mg/dL Final   • BUN 08/12/2021 11  6 - 20 mg/dL Final   • Creatinine 08/12/2021 0.60  0.51 - 0.95 mg/dL Final   • Glomerular Filtration Rate 08/12/2021 >90  >90 mL/min/1.73m2 Final   • BUN/ Creatinine Ratio 08/12/2021 18  7 - 25 Final   • Calcium 08/12/2021 8.5  8.4 - 10.2 mg/dL Final   • Bilirubin, Total 08/12/2021 0.1* 0.2 - 1.0 mg/dL Final   • GOT/AST 08/12/2021 13  <=37 Units/L Final   • GPT/ALT 08/12/2021 25  <64 Units/L Final   • Alkaline Phosphatase 08/12/2021 103  45 - 117 Units/L Final   • Albumin 08/12/2021 3.5* 3.6 - 5.1 g/dL Final   • Protein, Total 08/12/2021 6.9  6.4 - 8.2 g/dL Final   • Globulin 08/12/2021 3.4  2.0 - 4.0 g/dL Final   • A/G Ratio 08/12/2021 1.0  1.0 - 2.4 Final   • WBC 08/12/2021 8.2  4.2 - 11.0 K/mcL Final   • RBC 08/12/2021 3.80* 4.00 - 5.20 mil/mcL Final   • HGB 08/12/2021 10.8* 12.0 - 15.5 g/dL Final   • HCT 08/12/2021 33.1* 36.0 - 46.5 % Final   • MCV 08/12/2021 87.1  78.0 - 100.0 fl Final   • MCH 08/12/2021 28.4  26.0 - 34.0 pg Final   • MCHC 08/12/2021 32.6  32.0 - 36.5 g/dL Final   • RDW-CV 08/12/2021 14.0  11.0 - 15.0 % Final   • RDW-SD 08/12/2021 43.9  39.0 - 50.0 fL Final   • PLT 08/12/2021 253  140 - 450 K/mcL Final   • NRBC 08/12/2021 0  <=0 /100 WBC Final   • Neutrophil, Percent 08/12/2021 60  % Final   • Lymphocytes, Percent 08/12/2021 28  % Final   • Mono, Percent 08/12/2021 5  % Final   • Eosinophils, Percent 08/12/2021 1  %  Final   • Basophils, Percent 08/12/2021 1  % Final   • Bands, Percent 08/12/2021 5  0 - 10 % Final   • Absolute Neutrophil 08/12/2021 5.3  1.8 - 7.7 K/mcL Final   • Absolute Lymphocytes 08/12/2021 2.3  1.0 - 4.8 K/mcL Final   • Absolute Monocytes 08/12/2021 0.4  0.3 - 0.9 K/mcL Final   • Absolute Eosinophils 08/12/2021 0.1  0.0 - 0.5 K/mcL Final   • Absolute Basophils 08/12/2021 0.1  0.0 - 0.3 K/mcL Final   • Microcytosis 08/12/2021 Few   Final   • Polychromasia 08/12/2021 Few   Final   • Toxic Granulation 08/12/2021 Present   Final   Hospital Outpatient Visit on 07/29/2021   Component Date Value Ref Range Status   • Sodium 07/29/2021 141  135 - 145 mmol/L Final   • Potassium 07/29/2021 3.9  3.4 - 5.1 mmol/L Final   • Chloride 07/29/2021 104  98 - 107 mmol/L Final   • Carbon Dioxide 07/29/2021 29  21 - 32 mmol/L Final   • Anion Gap 07/29/2021 12  10 - 20 mmol/L Final   • Glucose 07/29/2021 112* 65 - 99 mg/dL Final   • BUN 07/29/2021 15  6 - 20 mg/dL Final   • Creatinine 07/29/2021 0.55  0.51 - 0.95 mg/dL Final   • Glomerular Filtration Rate 07/29/2021 >90  >90 mL/min/1.73m2 Final   • BUN/ Creatinine Ratio 07/29/2021 27* 7 - 25 Final   • Calcium 07/29/2021 8.3* 8.4 - 10.2 mg/dL Final   • Bilirubin, Total 07/29/2021 0.2  0.2 - 1.0 mg/dL Final   • GOT/AST 07/29/2021 15  <=37 Units/L Final   • GPT/ALT 07/29/2021 22  <64 Units/L Final   • Alkaline Phosphatase 07/29/2021 85  45 - 117 Units/L Final   • Albumin 07/29/2021 3.2* 3.6 - 5.1 g/dL Final   • Protein, Total 07/29/2021 7.0  6.4 - 8.2 g/dL Final   • Globulin 07/29/2021 3.8  2.0 - 4.0 g/dL Final   • A/G Ratio 07/29/2021 0.8* 1.0 - 2.4 Final   Office Visit on 07/29/2021   Component Date Value Ref Range Status   • VIA MED ONC PATHWAYS TAG 07/29/2021 [id number]   Final   Hospital Outpatient Visit on 07/27/2021   Component Date Value Ref Range Status   • WBC 07/27/2021 7.4  4.2 - 11.0 K/mcL Final   • RBC 07/27/2021 4.03  4.00 - 5.20 mil/mcL Final   • HGB 07/27/2021 11.4* 12.0  - 15.5 g/dL Final   • HCT 07/27/2021 35.6* 36.0 - 46.5 % Final   • MCV 07/27/2021 88.3  78.0 - 100.0 fl Final   • MCH 07/27/2021 28.3  26.0 - 34.0 pg Final   • MCHC 07/27/2021 32.0  32.0 - 36.5 g/dL Final   • RDW-CV 07/27/2021 13.3  11.0 - 15.0 % Final   • RDW-SD 07/27/2021 43.5  39.0 - 50.0 fL Final   • PLT 07/27/2021 305  140 - 450 K/mcL Final   • NRBC 07/27/2021 0  <=0 /100 WBC Final   • Neutrophil, Percent 07/27/2021 64  % Final   • Lymphocytes, Percent 07/27/2021 27  % Final   • Mono, Percent 07/27/2021 8  % Final   • Eosinophils, Percent 07/27/2021 1  % Final   • Basophils, Percent 07/27/2021 0  % Final   • Immature Granulocytes 07/27/2021 0  % Final   • Absolute Neutrophils 07/27/2021 4.7  1.8 - 7.7 K/mcL Final   • Absolute Lymphocytes 07/27/2021 2.0  1.0 - 4.8 K/mcL Final   • Absolute Monocytes 07/27/2021 0.6  0.3 - 0.9 K/mcL Final   • Absolute Eosinophils  07/27/2021 0.1  0.0 - 0.5 K/mcL Final   • Absolute Basophils 07/27/2021 0.0  0.0 - 0.3 K/mcL Final   • Absolute Immmature Granulocytes 07/27/2021 0.0  0.0 - 0.2 K/mcL Final   Admission on 07/21/2021, Discharged on 07/22/2021   Component Date Value Ref Range Status   • URINE PREGNANCY,QUAL 07/21/2021 Negative  Negative Final   • Internal Procedural Controls Accep* 07/21/2021 Yes   Final   • Case Report 07/21/2021    Final                    Value:Surgical Pathology                                Case: [id number]                                  Authorizing Provider:  Rayo Montague MD          Collected:           07/21/2021 1934              Ordering Location:     [facility] MAIN OR   Received:            07/22/2021 0746              Pathologist:           Dayana Jackson MD                                                               Specimen:    Breast, Left, left breast - complete axillary lymph node dissection level 1, 2, 3         • Pathologic Diagnosis 07/21/2021    Final                    Value:This result contains rich text formatting which  cannot be displayed here.   • Clinical Information 07/21/2021    Final                    Value:This result contains rich text formatting which cannot be displayed here.   • Gross Description 07/21/2021    Final                    Value:This result contains rich text formatting which cannot be displayed here.   • Microscopic Description 07/21/2021    Final                    Value:This result contains rich text formatting which cannot be displayed here.   • Disclaimer 07/21/2021    Final                    Value:This result contains rich text formatting which cannot be displayed here.   Office Visit on 07/08/2021   Component Date Value Ref Range Status   • VIA MED ONC PATHWAYS TAG 07/08/2021 [id number]   Final   • Prothrombin Time 07/08/2021 10.3  9.7 - 11.8 sec Final   • INR 07/08/2021 1.0    Final   • PTT 07/08/2021 28  22 - 30 sec Final   Admission on 06/30/2021, Discharged on 07/01/2021   Component Date Value Ref Range Status   • URINE PREGNANCY,QUAL 06/30/2021 Negative  Negative Final   • Internal Procedural Controls Accep* 06/30/2021 Yes   Final   • Case Report 06/30/2021    Final                    Value:Surgical Pathology                                Case: [id number]                                  Authorizing Provider:  Rayo Montague MD          Collected:           06/30/2021 1405              Ordering Location:     [facility] OR   Received:            06/30/2021 1454              Pathologist:           Ryan Camacho MD                                                        Specimens:   A) - Breast, Left, left modfied breast mastectomy with attached segment of pectoralis               major muscle                                                                                        B) - Breast, Left, [place] Lympoh Node #1 Left Axilla for fresh                                    C) - Breast, Left, Positive Lymph Node with URSZULA clip left axilla                         • Pathologic  Diagnosis 06/30/2021    Final                    Value:This result contains rich text formatting which cannot be displayed here.   • Comment 06/30/2021    Final                    Value:This result contains rich text formatting which cannot be displayed here.   • Synoptic Checklist 06/30/2021    Final                    Value:INVASIVE CARCINOMA OF THE BREAST: Resection  (INVASIVE CARCINOMA OF THE BREAST: COMPLETE EXCISION - All Specimens)                                                        8th Edition - Protocol posted: 2/26/2020                                                        SPECIMEN                               Procedure:    Total mastectomy                                Specimen Laterality:    Left                                                         TUMOR                               Tumor Site:    Clock position                                :    2 o'clock                                :    3 o'clock                              Histologic Type:    Invasive lobular carcinoma                              Glandular (Acinar) / Tubular Differentiation:    Score 3                              Nuclear Pleomorphism:    Score 1                              Mitotic Rate:    Score 1                              Overall Grade:    Grade 1 (scores of 3, 4 or 5)                              Tumor Size:    Greatest dimension of largest invasive focus (Millimeters): 4.1 mm                               Additional Dimension (Millimeters):    3.8 mm                               Additional Dimension (Millimeters):    2.1 mm                             Tumor Focality:    Multiple foci of invasive carcinoma                                Number of Foci:    8                                  Sizes of Individual Foci (Millimeters):    2.0 to 5.6 mm                             Ductal Carcinoma In Situ (DCIS):    Not identified                              Tumor Extent:                                 Lymphovascular  Invasion:    Not identified                              Dermal Lymphovascular Invasion:    Not identified                              Microcalcifications:    Present in non-neoplastic tissue                              Treatment Effect in the Breast:    No known presurgical therapy                                                         MARGINS                             Invasive Carcinoma Margins:    Uninvolved by invasive carcinoma                                Distance from Closest Margin (Millimeters):    0.7 mm                               Closest Margin(s):    Posterior                                                         LYMPH NODES                             Regional Lymph Nodes:    Involved by tumor cells                                Number of Lymph Nodes with Macrometastases (> 2 mm):    4                                Number of Lymph Nodes with Micrometastases (> 0.2 mm to 2 mm and / or > 200 cells):    0                                Size of Largest Metastatic Deposit (Millimeters):    5.7 mm                               Extranodal Extension:    Present                                Total Number of Lymph Nodes Examined:    4                                Number of [place] Nodes Examined:    1                                                         PATHOLOGIC STAGE CLASSIFICATION (pTNM, AJCC 8th Edition)                                                            TNM Descriptors:    m (multiple foci of invasive carcinoma)                              Primary Tumor (pT):    pT2                              Regional Lymph Nodes Modifier:    (sn): [place] node(s) evaluated.                              Regional Lymph Nodes (pN):    pN2a                                                         ADDITIONAL FINDINGS                             Additional Findings:    Ductal ectasia, stromal fibrosis, apocrine metaplasia, usual ductal hyperplasia, atypical ductal hyperplasia (A14)      • Clinical  Information 06/30/2021    Final                    Value:This result contains rich text formatting which cannot be displayed here.   • Gross Description 06/30/2021    Final                    Value:This result contains rich text formatting which cannot be displayed here.   • Disclaimer 06/30/2021    Final                    Value:This result contains rich text formatting which cannot be displayed here.   Lab Services on 06/27/2021   Component Date Value Ref Range Status   • SARS-CoV-2 by PCR 06/27/2021 Not Detected  Not Detected / Detected / Inhibitor Present Final   • Isolation Guidelines 06/27/2021    Final                    Value:This result contains rich text formatting which cannot be displayed here.   • Procedural Notes 06/27/2021    Final                    Value:This result contains rich text formatting which cannot be displayed here.   Hospital Outpatient Visit on 06/10/2021   Component Date Value Ref Range Status   • GLUCOSE, BEDSIDE - POINT OF CARE 06/10/2021 101* 70 - 99 mg/dL Final   Hospital Outpatient Visit on 05/07/2021   Component Date Value Ref Range Status   • Case Report 05/07/2021    Final                    Value:Surgical Pathology                                Case: [id number]                                  Authorizing Provider:  Rupa Patel MD      Collected:           05/07/2021 0830              Ordering Location:     [facility]    Received:            05/07/2021 1144                                     IMAGING MAMMOGRAPHY                                                          Pathologist:           Jamar Johns MD                                                         Specimens:   A) - Breast, Left, Left Breast 2 O'C                                                                B) - Breast, Left, Left Axilla                                                            • Pathologic Diagnosis 05/07/2021    Final                    Value:This result contains  rich text formatting which cannot be displayed here.   • Comment 05/07/2021    Final                    Value:This result contains rich text formatting which cannot be displayed here.   • Clinical Information 05/07/2021    Final                    Value:This result contains rich text formatting which cannot be displayed here.   • Gross Description 05/07/2021    Final                    Value:This result contains rich text formatting which cannot be displayed here.   • Disclaimer 05/07/2021    Final                    Value:This result contains rich text formatting which cannot be displayed here.   There may be more visits with results that are not included.       No results found for this visit on 08/12/21.       Pathology Results   No components found for: BIOPSY     Radiology Results        Cardiology Results   No results found for this or any previous visit.          Impression/Plan   Swati Hoskins is a 44 year old female , premenopausal, with recently diagnosed left breast lobular carcinoma.    -- Left breast cancer.  mpT2, pT2, cM0.  Status post left modified radical mastectomy and left sentinel lymph node biopsy.  Recently underwent completion left axillary lymph node dissection of levels 1, 2 and 3 with all 6 dissected lymph nodes negative for carcinoma.  Adjuvant treatment with chemotherapy (dose dense doxorubicin/cyclophosphamide followed by dose dense paclitaxel) followed by postmastectomy radiotherapy followed by adjuvant endocrine therapy has been recommended.  Perr new ASCO guideline released in early July, if she has a pathogenic BRCA1/2 variant she would be a candidate for adjuvant treatment with olaparib. She understands the curative intent of treatment.  She tolerated her first treatment well. She agrees to proceed with cycle #2 today. Follow up with Dr. Horta in 2 weeks. Will discuss genetic testing/counseling at that appointment.   -- Risk for myelosuppression.  On pro will  Be administered  following each treatment. She tolerated cycle #1 well.   --Risk for cardiac toxicity secondary to anthracycline chemotherapy and anticipated left sided radiotherapy.  Pretreatment echocardiogram on 7/23/2021 revealed a normal LVEF. She saw Dr. Cedeno on 8/10/2021. Will follow up in September.  --Cancer risk. Although she has no family history of breast cancer, will recommend testing for pathogenic BRCA1/2 variant.  If she has a pathogenic BRCA1/2 variant she would be a candidate for adjuvant treatment with olaparib.  Will discuss genetic testing at her next visit.   --Coping.  Denies depression and anxiety.  --Lymphedema. Currently in lymphedema therapy. Provided a prescription for compression garments today.   -- Limited English proficiency.  Polish language interpreteration was provided by her son at her request today.    Kat Crenshaw PA-C  [facility]  [facility]   Present, unchanged

## 2024-01-01 NOTE — OB PROVIDER H&P - NSLOWPPHRISK_OBGYN_A_OB
No previous uterine incision/Less than or equal to 4 previous vaginal births/No known bleeding disorder

## 2024-01-01 NOTE — CONSULT NOTE ADULT - PROBLEM SELECTOR RECOMMENDATION 3
Official bedside US was performed. Twin A MVP 4.41, inferior vertex, posterior placenta, EIF noted. Twin B MVP 6.1, superior breech, anterior placenta. US on 12/12/13 showed Twin A 996g, Twin B 966g, 3% discordance.   Twin gestations put the patient at higher risk of developed preeclampsia, gestational diabetes, and PTL.   Patient has not performed her GCT yet; UA noted to have protein and glucose. Advised GCT once patient is clinically well.

## 2024-01-01 NOTE — OB PROVIDER H&P - NSHPLABSRESULTS_GEN_ALL_CORE
7.8    6.28  )-----------( 298      ( 01 Jan 2024 13:00 )             25.3       01-01    133<L>  |  104  |  6.1<L>  ----------------------------<  128<H>  3.7   |  18.0<L>  |  0.49<L>    Ca    8.2<L>      01 Jan 2024 13:00    TPro  6.3<L>  /  Alb  3.1<L>  /  TBili  <0.2<L>  /  DBili  x   /  AST  13  /  ALT  7   /  AlkPhos  105  01-01

## 2024-01-02 ENCOUNTER — TRANSCRIPTION ENCOUNTER (OUTPATIENT)
Age: 24
End: 2024-01-02

## 2024-01-02 LAB
ALBUMIN SERPL ELPH-MCNC: 2.9 G/DL — LOW (ref 3.3–5.2)
ALBUMIN SERPL ELPH-MCNC: 2.9 G/DL — LOW (ref 3.3–5.2)
ALP SERPL-CCNC: 97 U/L — SIGNIFICANT CHANGE UP (ref 40–120)
ALP SERPL-CCNC: 97 U/L — SIGNIFICANT CHANGE UP (ref 40–120)
ALT FLD-CCNC: 5 U/L — SIGNIFICANT CHANGE UP
ALT FLD-CCNC: 5 U/L — SIGNIFICANT CHANGE UP
ANION GAP SERPL CALC-SCNC: 11 MMOL/L — SIGNIFICANT CHANGE UP (ref 5–17)
ANION GAP SERPL CALC-SCNC: 11 MMOL/L — SIGNIFICANT CHANGE UP (ref 5–17)
AST SERPL-CCNC: 11 U/L — SIGNIFICANT CHANGE UP
AST SERPL-CCNC: 11 U/L — SIGNIFICANT CHANGE UP
BASOPHILS # BLD AUTO: 0.01 K/UL — SIGNIFICANT CHANGE UP (ref 0–0.2)
BASOPHILS # BLD AUTO: 0.01 K/UL — SIGNIFICANT CHANGE UP (ref 0–0.2)
BASOPHILS NFR BLD AUTO: 0.1 % — SIGNIFICANT CHANGE UP (ref 0–2)
BASOPHILS NFR BLD AUTO: 0.1 % — SIGNIFICANT CHANGE UP (ref 0–2)
BILIRUB SERPL-MCNC: <0.2 MG/DL — LOW (ref 0.4–2)
BILIRUB SERPL-MCNC: <0.2 MG/DL — LOW (ref 0.4–2)
BUN SERPL-MCNC: 3.2 MG/DL — LOW (ref 8–20)
BUN SERPL-MCNC: 3.2 MG/DL — LOW (ref 8–20)
CALCIUM SERPL-MCNC: 8.4 MG/DL — SIGNIFICANT CHANGE UP (ref 8.4–10.5)
CALCIUM SERPL-MCNC: 8.4 MG/DL — SIGNIFICANT CHANGE UP (ref 8.4–10.5)
CHLORIDE SERPL-SCNC: 107 MMOL/L — SIGNIFICANT CHANGE UP (ref 96–108)
CHLORIDE SERPL-SCNC: 107 MMOL/L — SIGNIFICANT CHANGE UP (ref 96–108)
CO2 SERPL-SCNC: 21 MMOL/L — LOW (ref 22–29)
CO2 SERPL-SCNC: 21 MMOL/L — LOW (ref 22–29)
CREAT SERPL-MCNC: 0.38 MG/DL — LOW (ref 0.5–1.3)
CREAT SERPL-MCNC: 0.38 MG/DL — LOW (ref 0.5–1.3)
CULTURE RESULTS: SIGNIFICANT CHANGE UP
EGFR: 143 ML/MIN/1.73M2 — SIGNIFICANT CHANGE UP
EGFR: 143 ML/MIN/1.73M2 — SIGNIFICANT CHANGE UP
EOSINOPHIL # BLD AUTO: 0.12 K/UL — SIGNIFICANT CHANGE UP (ref 0–0.5)
EOSINOPHIL # BLD AUTO: 0.12 K/UL — SIGNIFICANT CHANGE UP (ref 0–0.5)
EOSINOPHIL NFR BLD AUTO: 1.6 % — SIGNIFICANT CHANGE UP (ref 0–6)
EOSINOPHIL NFR BLD AUTO: 1.6 % — SIGNIFICANT CHANGE UP (ref 0–6)
GLUCOSE SERPL-MCNC: 78 MG/DL — SIGNIFICANT CHANGE UP (ref 70–99)
GLUCOSE SERPL-MCNC: 78 MG/DL — SIGNIFICANT CHANGE UP (ref 70–99)
HCT VFR BLD CALC: 24 % — LOW (ref 34.5–45)
HCT VFR BLD CALC: 24 % — LOW (ref 34.5–45)
HGB BLD-MCNC: 7.4 G/DL — LOW (ref 11.5–15.5)
HGB BLD-MCNC: 7.4 G/DL — LOW (ref 11.5–15.5)
IMM GRANULOCYTES NFR BLD AUTO: 0.5 % — SIGNIFICANT CHANGE UP (ref 0–0.9)
IMM GRANULOCYTES NFR BLD AUTO: 0.5 % — SIGNIFICANT CHANGE UP (ref 0–0.9)
LYMPHOCYTES # BLD AUTO: 2.44 K/UL — SIGNIFICANT CHANGE UP (ref 1–3.3)
LYMPHOCYTES # BLD AUTO: 2.44 K/UL — SIGNIFICANT CHANGE UP (ref 1–3.3)
LYMPHOCYTES # BLD AUTO: 33.3 % — SIGNIFICANT CHANGE UP (ref 13–44)
LYMPHOCYTES # BLD AUTO: 33.3 % — SIGNIFICANT CHANGE UP (ref 13–44)
MCHC RBC-ENTMCNC: 24.7 PG — LOW (ref 27–34)
MCHC RBC-ENTMCNC: 24.7 PG — LOW (ref 27–34)
MCHC RBC-ENTMCNC: 30.8 GM/DL — LOW (ref 32–36)
MCHC RBC-ENTMCNC: 30.8 GM/DL — LOW (ref 32–36)
MCV RBC AUTO: 80.3 FL — SIGNIFICANT CHANGE UP (ref 80–100)
MCV RBC AUTO: 80.3 FL — SIGNIFICANT CHANGE UP (ref 80–100)
MONOCYTES # BLD AUTO: 0.49 K/UL — SIGNIFICANT CHANGE UP (ref 0–0.9)
MONOCYTES # BLD AUTO: 0.49 K/UL — SIGNIFICANT CHANGE UP (ref 0–0.9)
MONOCYTES NFR BLD AUTO: 6.7 % — SIGNIFICANT CHANGE UP (ref 2–14)
MONOCYTES NFR BLD AUTO: 6.7 % — SIGNIFICANT CHANGE UP (ref 2–14)
NEUTROPHILS # BLD AUTO: 4.22 K/UL — SIGNIFICANT CHANGE UP (ref 1.8–7.4)
NEUTROPHILS # BLD AUTO: 4.22 K/UL — SIGNIFICANT CHANGE UP (ref 1.8–7.4)
NEUTROPHILS NFR BLD AUTO: 57.8 % — SIGNIFICANT CHANGE UP (ref 43–77)
NEUTROPHILS NFR BLD AUTO: 57.8 % — SIGNIFICANT CHANGE UP (ref 43–77)
PLATELET # BLD AUTO: 256 K/UL — SIGNIFICANT CHANGE UP (ref 150–400)
PLATELET # BLD AUTO: 256 K/UL — SIGNIFICANT CHANGE UP (ref 150–400)
POTASSIUM SERPL-MCNC: 3.5 MMOL/L — SIGNIFICANT CHANGE UP (ref 3.5–5.3)
POTASSIUM SERPL-MCNC: 3.5 MMOL/L — SIGNIFICANT CHANGE UP (ref 3.5–5.3)
POTASSIUM SERPL-SCNC: 3.5 MMOL/L — SIGNIFICANT CHANGE UP (ref 3.5–5.3)
POTASSIUM SERPL-SCNC: 3.5 MMOL/L — SIGNIFICANT CHANGE UP (ref 3.5–5.3)
PROT SERPL-MCNC: 5.5 G/DL — LOW (ref 6.6–8.7)
PROT SERPL-MCNC: 5.5 G/DL — LOW (ref 6.6–8.7)
RBC # BLD: 2.99 M/UL — LOW (ref 3.8–5.2)
RBC # BLD: 2.99 M/UL — LOW (ref 3.8–5.2)
RBC # FLD: 15.7 % — HIGH (ref 10.3–14.5)
RBC # FLD: 15.7 % — HIGH (ref 10.3–14.5)
SODIUM SERPL-SCNC: 139 MMOL/L — SIGNIFICANT CHANGE UP (ref 135–145)
SODIUM SERPL-SCNC: 139 MMOL/L — SIGNIFICANT CHANGE UP (ref 135–145)
SPECIMEN SOURCE: SIGNIFICANT CHANGE UP
WBC # BLD: 7.32 K/UL — SIGNIFICANT CHANGE UP (ref 3.8–10.5)
WBC # BLD: 7.32 K/UL — SIGNIFICANT CHANGE UP (ref 3.8–10.5)
WBC # FLD AUTO: 7.32 K/UL — SIGNIFICANT CHANGE UP (ref 3.8–10.5)
WBC # FLD AUTO: 7.32 K/UL — SIGNIFICANT CHANGE UP (ref 3.8–10.5)

## 2024-01-02 PROCEDURE — 99233 SBSQ HOSP IP/OBS HIGH 50: CPT | Mod: GC

## 2024-01-02 RX ORDER — NITROFURANTOIN MACROCRYSTAL 50 MG
1 CAPSULE ORAL
Qty: 30 | Refills: 2
Start: 2024-01-02 | End: 2024-03-31

## 2024-01-02 RX ORDER — ACETAMINOPHEN 500 MG
975 TABLET ORAL ONCE
Refills: 0 | Status: COMPLETED | OUTPATIENT
Start: 2024-01-02 | End: 2024-01-02

## 2024-01-02 RX ORDER — DIPHENHYDRAMINE HCL 50 MG
25 CAPSULE ORAL EVERY 4 HOURS
Refills: 0 | Status: DISCONTINUED | OUTPATIENT
Start: 2024-01-02 | End: 2024-01-03

## 2024-01-02 RX ADMIN — Medication 1 TABLET(S): at 11:14

## 2024-01-02 RX ADMIN — Medication 975 MILLIGRAM(S): at 09:07

## 2024-01-02 RX ADMIN — SODIUM CHLORIDE 125 MILLILITER(S): 9 INJECTION, SOLUTION INTRAVENOUS at 06:14

## 2024-01-02 RX ADMIN — Medication 25 MILLIGRAM(S): at 09:06

## 2024-01-02 NOTE — PROGRESS NOTE ADULT - ASSESSMENT
23year old  (Last Menstrual Period 2023 EDC 3/12/2024) at 29 weeks 6 days gestational age by first trimester ultrasound admitted to the antepartum service for a complicated UTI. HD#2 23year old  (Last Menstrual Period 2023 EDC 3/12/2024) at 29 weeks 6 days gestational age by first trimester ultrasound admitted to the antepartum service for a complicated UTI. She has symptomatic severe anemia during pregnancy. HD#2

## 2024-01-02 NOTE — DISCHARGE NOTE ANTEPARTUM - PATIENT PORTAL LINK FT
You can access the FollowMyHealth Patient Portal offered by [facility] by registering at the following website: [email or web address]. By joining [employer]’s FollowMyHealth portal, you will also be able to view your health information using other applications (apps) compatible with our system. You can access the FollowMyHealth Patient Portal offered by [facility] by registering at the following website: [email or web address]. By joining [employer]’s FollowMyHealth portal, you will also be able to view your health information using other applications (apps) compatible with our system. You can access the FollowMyHealth Patient Portal offered by [facility] by registering at the following website: [email or web address]. By joining [employer]’s FollowMyHealth portal, you will also be able to view your health information using other applications (apps) compatible with our system.

## 2024-01-02 NOTE — DISCHARGE NOTE ANTEPARTUM - CARE PROVIDERS DIRECT ADDRESSES
,[email or web address],DirectAddress_Unknown ,[email or web address],DirectAddress_Unknown ,[email or web address],DirectAddress_Unknown

## 2024-01-02 NOTE — DISCHARGE NOTE ANTEPARTUM - CARE PROVIDER_API CALL
Arnoldo Rogers)  Maternal/Fetal Medicine  [address] [place] [address]  Phone: [phone]  Fax: [phone]  Scheduled Appointment: 01/10/2024 10:00 AM    Tyshawn Ng  Obstetrics and Gynecology  [facility] [place] [address]  Phone: [phone]  Fax: [phone]  Scheduled Appointment: 01/17/2024   Arnoldo Rogers)  Maternal/Fetal Medicine  [address] [place] [address]  Phone: [phone]  Fax: [phone]  Scheduled Appointment: 01/10/2024 10:00 AM    Tyshawn Ng  Obstetrics and Gynecology  [facility] [place] [address]  Phone: [phone]  Fax: [phone]  Scheduled Appointment: 01/17/2024   Arnoldo Rogers)  Maternal/Fetal Medicine  [address] [place] [address]  Phone: [phone]  Fax: [phone]  Scheduled Appointment: 01/10/2024 10:00 AM    Tyshawn Ng  Obstetrics and Gynecology  [facility] [place] [address]  Phone: [phone]  Fax: [phone]  Scheduled Appointment: 01/17/2024

## 2024-01-02 NOTE — DISCHARGE NOTE ANTEPARTUM - PLAN OF CARE
1) Continúe tomando Macrobid (un antibiotico) diariamente  2) Llame a bennett obstetra y ginecólogo si tiene fiebre (superior a 101 °F), dolor lumbar intenso o dolor intenso al orinar 1) Continúe tomando jhonathan vitaminas prenatales y ugo   2) Teresa simon pablo con bennett obstetra en un plazo de 2 semanas   3) Llame a bennett obstetra si tiene mareos, palpitaciones, dificultad para respirar y/o dolor en el pecho

## 2024-01-02 NOTE — DISCHARGE NOTE ANTEPARTUM - NS MD DC FALL RISK RISK
For information on Fall & Injury Prevention, visit: [email or web address]-[email or web address] OR  [email or web address]://[email or web address]/[email or web address]-avoid-injury OR  https://www.cdc.gov/steadi/patient.html For information on Fall & Injury Prevention, visit: [email or web address]://www.[facility]/news/fall-prevention-protects-and-maintains-health-and-mobility OR  https://www.[facility]/news/fall-prevention-tips-to-avoid-injury OR  https://www.cdc.gov/steadi/patient.html For information on Fall & Injury Prevention, visit: https://www.[facility]/news/fall-prevention-protects-and-maintains-health-and-mobility OR  https://www.[facility]/news/fall-prevention-tips-to-avoid-injury OR  https://www.cdc.gov/steadi/patient.html

## 2024-01-02 NOTE — DISCHARGE NOTE ANTEPARTUM - CARE PLAN
1 Principal Discharge DX:	Symptomatic anemia  Assessment and plan of treatment:	1) Continúe tomando jhonathan vitaminas prenatales y ugo   2) Teresa simon pablo con bennett obstetra en un plazo de 2 semanas   3) Llame a bennett obstetra si tiene mareos, palpitaciones, dificultad para respirar y/o dolor en el pecho  Secondary Diagnosis:	Recurrent UTI  Assessment and plan of treatment:	1) Continúe tomando Macrobid (un antibiotico) diariamente  2) Llame a bennett obstetra y ginecólogo si tiene fiebre (superior a 101 °F), dolor lumbar intenso o dolor intenso al orinar

## 2024-01-02 NOTE — PROGRESS NOTE ADULT - SUBJECTIVE AND OBJECTIVE BOX
ASHISH YANES  A 23year old  (Last Menstrual Period 2023 EDC 3/12/2024) at 29 weeks 6 days gestational age by first trimester ultrasound admitted to the antepartum service for a complicated UTI. HD#2    SUBJECTIVE:  No acute events overnight.  Patient reports abdominal pain has resolved overnight.  Denies fevers, chills, light-headedness dizziness.    This pregnancy is complicated by:  - recurrent UTIs; patient noted to have e. Coli x3 (2 in the outpatient setting and 1 requiring hospitalization w/ MICU admission for pressors).   - di-di twin gestation  - anemia    Vital Signs:  Vital Signs Last 24 Hrs  T(C): 36.7 (2024 03:58), Max: 36.7 (2024 18:35)  T(F): 98.1 (2024 03:58), Max: 98.1 (2024 18:35)  HR: 86 (2024 03:58) (85 - 98)  BP: 94/56 (2024 03:58) (90/53 - 97/62)    RR: 16 (2024 03:58) (16 - 16)  SpO2: 96% (2024 03:58) (95% - 98%)    Parameters below as of 2024 03:58  Patient On (Oxygen Delivery Method): room air      Height (cm): 160 (24 @ 15:28)  Weight (kg): 79.787 (24 @ 15:28)  BMI (kg/m2): 31.2 (24 @ 15:28)  BSA (m2): 1.83 (24 @ 15:28)    Physical Exam:  General: Adult female in NAD, AAOx3  Lungs: unlabored breathing  Abdomen: soft, non-tender, gravid uterus  Skin: No rashes or lesions on exposed skin    NST ( @ 1600):  Fetus A baseline FHR 135s, moderate variability, +accels, -decels  Fetus B baseline FHR 140s, moderate variability, +accels, -decels  [place]: No contractions    Labs:                        7.8    6.28  )-----------( 298      ( 2024 13:00 )             25.3                         7.4    7.32  )-----------( 256      ( 2024 04:37 )             24.0         139  |  107  |  3.2<L>  ----------------------------<  78  3.5   |  21.0<L>  |  0.38<L>    Ca    8.4      2024 04:37    TPro  5.5<L>  /  Alb  2.9<L>  /  TBili  <0.2<L>  /  DBili  x   /  AST  11  /  ALT  5   /  AlkPhos  97  02    PT/INR - ( 2024 13:00 )   PT: 11.6 sec;   INR: 1.05 ratio         PTT - ( 2024 13:00 )  PTT:28.1 sec        Radiology:    MEDICATIONS  (STANDING):  cefTRIAXone Injectable. 1000 milliGRAM(s) IV Push every 24 hours  lactated ringers. 1000 milliLiter(s) (125 mL/Hr) IV Continuous <Continuous>  prenatal multivitamin 1 Tablet(s) Oral daily    MEDICATIONS  (PRN):   ASHISH YANES  A 23year old  (Last Menstrual Period 2023 EDC 3/12/2024) at 29 weeks 6 days gestational age by first trimester ultrasound admitted to the antepartum service for a complicated UTI. HD#2    SUBJECTIVE:  No acute events overnight.  Patient reports abdominal pain has resolved overnight.  Denies fevers, chills, light-headedness dizziness.    This pregnancy is complicated by:  - recurrent UTIs; patient noted to have e. Coli x3 (2 in the outpatient setting and 1 requiring hospitalization w/ MICU admission for pressors).   - di-di twin gestation  - anemia    Vital Signs:  Vital Signs Last 24 Hrs  T(C): 36.7 (2024 03:58), Max: 36.7 (2024 18:35)  T(F): 98.1 (2024 03:58), Max: 98.1 (2024 18:35)  HR: 86 (2024 03:58) (85 - 98)  BP: 94/56 (2024 03:58) (90/53 - 97/62)    RR: 16 (2024 03:58) (16 - 16)  SpO2: 96% (2024 03:58) (95% - 98%)    Parameters below as of 2024 03:58  Patient On (Oxygen Delivery Method): room air      Height (cm): 160 (24 @ 15:28)  Weight (kg): 79.787 (24 @ 15:28)  BMI (kg/m2): 31.2 (24 @ 15:28)  BSA (m2): 1.83 (24 @ 15:28)    Physical Exam:  General: Adult female in NAD, AAOx3  Lungs: unlabored breathing  Abdomen: soft, non-tender, gravid uterus  Skin: No rashes or lesions on exposed skin    NST ( @ 1600):  Fetus A baseline FHR 135s, moderate variability, +accels, -decels  Fetus B baseline FHR 140s, moderate variability, +accels, -decels  [place]: No contractions    Labs:                        7.8    6.28  )-----------( 298      ( 2024 13:00 )             25.3                         7.4    7.32  )-----------( 256      ( 2024 04:37 )             24.0         139  |  107  |  3.2<L>  ----------------------------<  78  3.5   |  21.0<L>  |  0.38<L>    Ca    8.4      2024 04:37    TPro  5.5<L>  /  Alb  2.9<L>  /  TBili  <0.2<L>  /  DBili  x   /  AST  11  /  ALT  5   /  AlkPhos  97  02    PT/INR - ( 2024 13:00 )   PT: 11.6 sec;   INR: 1.05 ratio         PTT - ( 2024 13:00 )  PTT:28.1 sec        Radiology:    MEDICATIONS  (STANDING):  cefTRIAXone Injectable. 1000 milliGRAM(s) IV Push every 24 hours  lactated ringers. 1000 milliLiter(s) (125 mL/Hr) IV Continuous <Continuous>  prenatal multivitamin 1 Tablet(s) Oral daily    MEDICATIONS  (PRN):

## 2024-01-02 NOTE — PROGRESS NOTE ADULT - PROBLEM SELECTOR PLAN 4
Patient noted to have microcytic anemia on presentation, newly microcytic from lab work from last hospitalization in October. Iron studies performed. Will review this morning. - SCDs  - consider chemoppx on HD#3.

## 2024-01-02 NOTE — DISCHARGE NOTE ANTEPARTUM - MEDICATION SUMMARY - MEDICATIONS TO TAKE
I will START or STAY ON the medications listed below when I get home from the hospital:    Prenatal Multivitamins with Folic Acid 1 mg oral tablet  -- 1 tab(s) by mouth once a day  -- Indication: For 29 weeks gestation of pregnancy    Macrobid 100 mg oral capsule  -- 1 cap(s) by mouth once a day  -- Indication: For Complicated UTI (urinary tract infection)   I will START or STAY ON the medications listed below when I get home from the hospital:    Prenatal Multivitamins with Folic Acid 1 mg oral tablet  -- 1 tab(s) by mouth once a day  -- Indication: For 29 weeks gestation of pregnancy    ferrous sulfate 325 mg (65 mg elemental iron) oral delayed release tablet  -- 1 tab(s) by mouth once a day  -- Indication: For anemia    Macrobid 100 mg oral capsule  -- 1 cap(s) by mouth once a day  -- Indication: For Complicated UTI (urinary tract infection)

## 2024-01-02 NOTE — DISCHARGE NOTE ANTEPARTUM - MEDICATION SUMMARY - MEDICATIONS TO STOP TAKING
I will STOP taking the medications listed below when I get home from the hospital:    cephalexin 500 mg oral tablet  -- 1 tab(s) by mouth 4 times a day

## 2024-01-02 NOTE — CHART NOTE - NSCHARTNOTEFT_GEN_A_CORE
Patient seen and evaluated at bedside during MFM rounds with Dr. Rogers. She is denies back pain. She endorses lightheadedness and dizziness during ambulation. VSS. Hgb 7.4 this AM. R/B/A to a blood transfusion were reviewed. Patient verbalized understanding. All questions answered. Consent signed and 2u pRBCs ordered. Patient to be pre-medicated with Tylenol and Benadryl prior to blood transfusion. Will re-asses dispo plan after blood transfusion.     Dr. Rogers at bedside
twin a  Baseline 145 moderate variability   positive accelerations   no decelarations    twin b  Baseline 145 moderate variability   positive accelerations   no decelarations    Both tracings reactive for gestational age.     Josep LOMBARDOM Fellow

## 2024-01-02 NOTE — DISCHARGE NOTE ANTEPARTUM - PROVIDER TOKENS
PROVIDER:[[id number]],SCHEDULEDAPPT:[01/10/2024],SCHEDULEDAPPTTIME:[10:00 AM]],PROVIDER:[TOKEN:[[id number]],SCHEDULEDAPPT:[01/17/2024]] PROVIDER:[TOKEN:[[id number]],SCHEDULEDAPPT:[01/10/2024],SCHEDULEDAPPTTIME:[10:00 AM]],PROVIDER:[TOKEN:[[id number]],SCHEDULEDAPPT:[01/17/2024]] PROVIDER:[[id number]],SCHEDULEDAPPT:[01/10/2024],SCHEDULEDAPPTTIME:[10:00 AM]],PROVIDER:[TOKEN:[[id number]],SCHEDULEDAPPT:[01/17/2024]]

## 2024-01-02 NOTE — PROGRESS NOTE ADULT - ATTENDING COMMENTS
Twin pregnancy with complicated UTI and symptomatic anemia. Continue antibiotic Rx. Transfuse 2 units PRBCs. No

## 2024-01-02 NOTE — DISCHARGE NOTE ANTEPARTUM - NSDCCONDITION_GEN_ALL_CORE
You can access the FollowMyHealth Patient Portal offered by [facility] by registering at the following website: [email or web address]. By joining [employer]’s FollowMyHealth portal, you will also be able to view your health information using other applications (apps) compatible with our system. Stable

## 2024-01-02 NOTE — DISCHARGE NOTE ANTEPARTUM - HOSPITAL COURSE
Patient was admitted for back pain and symptomatic anemia. Patient received IV antibiotics and 2u packed RBCs. Hospital course was uncomplicated. She has no difficulty with ambulation, voiding, or PO intake. Lab values and vital signs are within normal limits prior to discharge.

## 2024-01-02 NOTE — PROGRESS NOTE ADULT - PROBLEM SELECTOR PLAN 1
Patient noted to have a history of recurrent UTIs; 3 UTIs in this pregnancy with last one requiring a MICU admission for pressor support; patient discharged on antibiotics however did not receive suppressive therapy outpatient. Patient represents with symptoms of cystitis, low suspicion for pyelonephritis at this time but high suspicion of this developing. Patient is afebrile. Continue with rocephin. Last 3 UTIs are susceptible; will transition to suppressive therapy during admission. Continue with maintenance IVF at 125cc/hr. Tylenol ordered for pain control. Patient noted to have a history of recurrent UTIs; 3 UTIs in this pregnancy with last one requiring a MICU admission for pressor support; patient discharged on antibiotics however did not receive suppressive therapy outpatient. Patient represents with symptoms of cystitis, low suspicion for pyelonephritis at this time but high suspicion of this developing. Patient is afebrile. Continue with IV Rocephin. Last 3 UTIs are susceptible; will transition to suppressive therapy during admission. Continue with maintenance IVF at 125cc/hr. Tylenol ordered for pain control

## 2024-01-02 NOTE — PROGRESS NOTE ADULT - PROBLEM SELECTOR PLAN 2
- PNV  -NST BID Patient noted to have microcytic anemia. Iron studies performed. She has fatigue, general malaise and dizziness. I recommend giving 2 units of PRBCs for symptomatic anemia during pregnancy

## 2024-01-03 VITALS
DIASTOLIC BLOOD PRESSURE: 57 MMHG | HEART RATE: 93 BPM | TEMPERATURE: 98 F | RESPIRATION RATE: 14 BRPM | SYSTOLIC BLOOD PRESSURE: 99 MMHG | OXYGEN SATURATION: 98 %

## 2024-01-03 DIAGNOSIS — O99.013 ANEMIA COMPLICATING PREGNANCY, THIRD TRIMESTER: ICD-10-CM

## 2024-01-03 LAB
BASOPHILS # BLD AUTO: 0.02 K/UL — SIGNIFICANT CHANGE UP (ref 0–0.2)
BASOPHILS # BLD AUTO: 0.02 K/UL — SIGNIFICANT CHANGE UP (ref 0–0.2)
BASOPHILS NFR BLD AUTO: 0.3 % — SIGNIFICANT CHANGE UP (ref 0–2)
BASOPHILS NFR BLD AUTO: 0.3 % — SIGNIFICANT CHANGE UP (ref 0–2)
EOSINOPHIL # BLD AUTO: 0.21 K/UL — SIGNIFICANT CHANGE UP (ref 0–0.5)
EOSINOPHIL # BLD AUTO: 0.21 K/UL — SIGNIFICANT CHANGE UP (ref 0–0.5)
EOSINOPHIL NFR BLD AUTO: 2.8 % — SIGNIFICANT CHANGE UP (ref 0–6)
EOSINOPHIL NFR BLD AUTO: 2.8 % — SIGNIFICANT CHANGE UP (ref 0–6)
HCT VFR BLD CALC: 29.5 % — LOW (ref 34.5–45)
HCT VFR BLD CALC: 29.5 % — LOW (ref 34.5–45)
HGB BLD-MCNC: 9.3 G/DL — LOW (ref 11.5–15.5)
HGB BLD-MCNC: 9.3 G/DL — LOW (ref 11.5–15.5)
IMM GRANULOCYTES NFR BLD AUTO: 1.2 % — HIGH (ref 0–0.9)
IMM GRANULOCYTES NFR BLD AUTO: 1.2 % — HIGH (ref 0–0.9)
LYMPHOCYTES # BLD AUTO: 2.42 K/UL — SIGNIFICANT CHANGE UP (ref 1–3.3)
LYMPHOCYTES # BLD AUTO: 2.42 K/UL — SIGNIFICANT CHANGE UP (ref 1–3.3)
LYMPHOCYTES # BLD AUTO: 32.4 % — SIGNIFICANT CHANGE UP (ref 13–44)
LYMPHOCYTES # BLD AUTO: 32.4 % — SIGNIFICANT CHANGE UP (ref 13–44)
MCHC RBC-ENTMCNC: 25.8 PG — LOW (ref 27–34)
MCHC RBC-ENTMCNC: 25.8 PG — LOW (ref 27–34)
MCHC RBC-ENTMCNC: 31.5 GM/DL — LOW (ref 32–36)
MCHC RBC-ENTMCNC: 31.5 GM/DL — LOW (ref 32–36)
MCV RBC AUTO: 81.7 FL — SIGNIFICANT CHANGE UP (ref 80–100)
MCV RBC AUTO: 81.7 FL — SIGNIFICANT CHANGE UP (ref 80–100)
MONOCYTES # BLD AUTO: 0.44 K/UL — SIGNIFICANT CHANGE UP (ref 0–0.9)
MONOCYTES # BLD AUTO: 0.44 K/UL — SIGNIFICANT CHANGE UP (ref 0–0.9)
MONOCYTES NFR BLD AUTO: 5.9 % — SIGNIFICANT CHANGE UP (ref 2–14)
MONOCYTES NFR BLD AUTO: 5.9 % — SIGNIFICANT CHANGE UP (ref 2–14)
NEUTROPHILS # BLD AUTO: 4.29 K/UL — SIGNIFICANT CHANGE UP (ref 1.8–7.4)
NEUTROPHILS # BLD AUTO: 4.29 K/UL — SIGNIFICANT CHANGE UP (ref 1.8–7.4)
NEUTROPHILS NFR BLD AUTO: 57.4 % — SIGNIFICANT CHANGE UP (ref 43–77)
NEUTROPHILS NFR BLD AUTO: 57.4 % — SIGNIFICANT CHANGE UP (ref 43–77)
NRBC # BLD: 1 /100 WBCS — HIGH (ref 0–0)
NRBC # BLD: 1 /100 WBCS — HIGH (ref 0–0)
PLATELET # BLD AUTO: 265 K/UL — SIGNIFICANT CHANGE UP (ref 150–400)
PLATELET # BLD AUTO: 265 K/UL — SIGNIFICANT CHANGE UP (ref 150–400)
RBC # BLD: 3.61 M/UL — LOW (ref 3.8–5.2)
RBC # BLD: 3.61 M/UL — LOW (ref 3.8–5.2)
RBC # FLD: 15.8 % — HIGH (ref 10.3–14.5)
RBC # FLD: 15.8 % — HIGH (ref 10.3–14.5)
WBC # BLD: 7.47 K/UL — SIGNIFICANT CHANGE UP (ref 3.8–10.5)
WBC # BLD: 7.47 K/UL — SIGNIFICANT CHANGE UP (ref 3.8–10.5)
WBC # FLD AUTO: 7.47 K/UL — SIGNIFICANT CHANGE UP (ref 3.8–10.5)
WBC # FLD AUTO: 7.47 K/UL — SIGNIFICANT CHANGE UP (ref 3.8–10.5)

## 2024-01-03 PROCEDURE — 87086 URINE CULTURE/COLONY COUNT: CPT

## 2024-01-03 PROCEDURE — 85730 THROMBOPLASTIN TIME PARTIAL: CPT

## 2024-01-03 PROCEDURE — 85610 PROTHROMBIN TIME: CPT

## 2024-01-03 PROCEDURE — 86923 COMPATIBILITY TEST ELECTRIC: CPT

## 2024-01-03 PROCEDURE — 86900 BLOOD TYPING SEROLOGIC ABO: CPT

## 2024-01-03 PROCEDURE — 80053 COMPREHEN METABOLIC PANEL: CPT

## 2024-01-03 PROCEDURE — 87040 BLOOD CULTURE FOR BACTERIA: CPT

## 2024-01-03 PROCEDURE — 83550 IRON BINDING TEST: CPT

## 2024-01-03 PROCEDURE — 36415 COLL VENOUS BLD VENIPUNCTURE: CPT

## 2024-01-03 PROCEDURE — 86901 BLOOD TYPING SEROLOGIC RH(D): CPT

## 2024-01-03 PROCEDURE — 99232 SBSQ HOSP IP/OBS MODERATE 35: CPT | Mod: GC

## 2024-01-03 PROCEDURE — 85025 COMPLETE CBC W/AUTO DIFF WBC: CPT

## 2024-01-03 PROCEDURE — 86850 RBC ANTIBODY SCREEN: CPT

## 2024-01-03 PROCEDURE — 81001 URINALYSIS AUTO W/SCOPE: CPT

## 2024-01-03 PROCEDURE — 84466 ASSAY OF TRANSFERRIN: CPT

## 2024-01-03 PROCEDURE — 0225U NFCT DS DNA&RNA 21 SARSCOV2: CPT

## 2024-01-03 PROCEDURE — 90715 TDAP VACCINE 7 YRS/> IM: CPT

## 2024-01-03 PROCEDURE — P9016: CPT

## 2024-01-03 PROCEDURE — 83540 ASSAY OF IRON: CPT

## 2024-01-03 PROCEDURE — 36430 TRANSFUSION BLD/BLD COMPNT: CPT

## 2024-01-03 PROCEDURE — 82728 ASSAY OF FERRITIN: CPT

## 2024-01-03 RX ORDER — FERROUS SULFATE 325(65) MG
1 TABLET ORAL
Qty: 30 | Refills: 0
Start: 2024-01-03 | End: 2024-02-01

## 2024-01-03 RX ORDER — NITROFURANTOIN MACROCRYSTAL 50 MG
100 CAPSULE ORAL
Refills: 0 | Status: DISCONTINUED | OUTPATIENT
Start: 2024-01-03 | End: 2024-01-03

## 2024-01-03 RX ORDER — TETANUS TOXOID, REDUCED DIPHTHERIA TOXOID AND ACELLULAR PERTUSSIS VACCINE, ADSORBED 5; 2.5; 8; 8; 2.5 [IU]/.5ML; [IU]/.5ML; UG/.5ML; UG/.5ML; UG/.5ML
0.5 SUSPENSION INTRAMUSCULAR ONCE
Refills: 0 | Status: COMPLETED | OUTPATIENT
Start: 2024-01-03 | End: 2024-01-03

## 2024-01-03 RX ADMIN — TETANUS TOXOID, REDUCED DIPHTHERIA TOXOID AND ACELLULAR PERTUSSIS VACCINE, ADSORBED 0.5 MILLILITER(S): 5; 2.5; 8; 8; 2.5 SUSPENSION INTRAMUSCULAR at 13:54

## 2024-01-03 RX ADMIN — Medication 1 TABLET(S): at 13:42

## 2024-01-03 NOTE — PROGRESS NOTE ADULT - ASSESSMENT
23year old  (Last Menstrual Period 2023 EDC 3/12/2024) at 30 weeks 0 days gestational age by first trimester ultrasound admitted to the antepartum service for a complicated UTI. HD#3

## 2024-01-03 NOTE — PROGRESS NOTE ADULT - PROBLEM SELECTOR PLAN 1
Patient noted to have a history of recurrent UTIs; 3 UTIs in this pregnancy with last one requiring a MICU admission for pressor support; patient discharged on antibiotics however did not receive suppressive therapy outpatient. Patient represents with symptoms of cystitis, low suspicion for pyelonephritis at this time but high suspicion of this developing. Patient is afebrile. Urine Culture negative. Continue with rocephin. Last 3 UTIs are susceptible; will transition to suppressive therapy during admission. Tylenol ordered for pain control.

## 2024-01-03 NOTE — PROGRESS NOTE ADULT - SUBJECTIVE AND OBJECTIVE BOX
ASHISH YANES   A 23year old  (Last Menstrual Period 2023 EDC 3/12/2024) at 30 weeks 0 days gestational age by first trimester ultrasound admitted to the antepartum service for a complicated UTI. HD#3    SUBJECTIVE:       This pregnancy is complicated by:  - recurrent UTIs; patient noted to have e. Coli x3 (2 in the outpatient setting and 1 requiring hospitalization w/ MICU admission for pressors).   - di-di twin gestation  - anemia    Vital Signs:  Vital Signs Last 24 Hrs  T(C): 36.7 (2024 04:11), Max: 37 (2024 09:50)  T(F): 98.1 (2024 04:11), Max: 98.6 (2024 09:50)  HR: 81 (2024 04:11) (81 - 94)  BP: 93/56 (2024 04:11) (90/51 - 111/66)  RR: 18 (2024 04:11) (16 - 18)  SpO2: 98% (2024 04:11) (96% - 100%)    Parameters below as of 2024 23:38  Patient On (Oxygen Delivery Method): room air      Physical Exam:  General: Adult female in NAD, AAOx3  Lungs: unlabored breathing  Abdomen: soft, non-tender, gravid uterus  Skin: No rashes or lesions on exposed skin    NST ( @ 1400)  Fetus A: baseline FHR 145s, moderate variability, +accels, -decels  Fetus B: baseline FHR 145s, moderate variability, +accels, -decels  [place]: no contractions    Labs:               7.8    6.28  )-----------( 298      ( 2024 13:00 )             25.3                         7.4    7.32  )-----------( 256      ( 2024 04:37 )             24.0                         9.3    7.47  )-----------( 265      ( 2024 04:31 )             29.5     01-    139  |  107  |  3.2<L>  ----------------------------<  78  3.5   |  21.0<L>  |  0.38<L>    Ca    8.4      2024 04:37    TPro  5.5<L>  /  Alb  2.9<L>  /  TBili  <0.2<L>  /  DBili  x   /  AST  11  /  ALT  5   /  AlkPhos  97      PT/INR - ( 2024 13:00 )   PT: 11.6 sec;   INR: 1.05 ratio         PTT - ( 2024 13:00 )  PTT:28.1 sec      Culture - Blood (collected 24 @ 15:35)  Source: .Blood Blood-Peripheral  Preliminary Report (24 @ 02:03):    No growth at 24 hours    Culture - Blood (collected 24 @ 15:32)  Source: .Blood Blood-Peripheral  Preliminary Report (24 @ 02:03):    No growth at 24 hours    Culture - Urine (collected 24 @ 15:10)  Source: Clean Catch Clean Catch (Midstream)  Final Report (24 @ 19:04):    <10,000 CFU/mL Normal Urogenital Meredith    Culture - Urine (collected 24 @ 13:00)  Source: Clean Catch Clean Catch (Midstream)  Final Report (24 @ 19:04):    <10,000 CFU/mL Normal Urogenital Meredith      MEDICATIONS  (STANDING):  prenatal multivitamin 1 Tablet(s) Oral daily    MEDICATIONS  (PRN):  diphenhydrAMINE 25 milliGRAM(s) Oral every 4 hours PRN Rash and/or Itching   ASHISH YANES   A 23year old  (Last Menstrual Period 2023 EDC 3/12/2024) at 30 weeks 0 days gestational age by first trimester ultrasound admitted to the antepartum service for a complicated UTI. HD#3    SUBJECTIVE:       This pregnancy is complicated by:  - recurrent UTIs; patient noted to have e. Coli x3 (2 in the outpatient setting and 1 requiring hospitalization w/ MICU admission for pressors).   - di-di twin gestation  - anemia    Vital Signs:  Vital Signs Last 24 Hrs  T(C): 36.7 (2024 04:11), Max: 37 (2024 09:50)  T(F): 98.1 (2024 04:11), Max: 98.6 (2024 09:50)  HR: 81 (2024 04:11) (81 - 94)  BP: 93/56 (2024 04:11) (90/51 - 111/66)  RR: 18 (2024 04:11) (16 - 18)  SpO2: 98% (2024 04:11) (96% - 100%)    Parameters below as of 2024 23:38  Patient On (Oxygen Delivery Method): room air      Physical Exam:  General: Adult female in NAD, AAOx3  Lungs: unlabored breathing  Abdomen: soft, non-tender, gravid uterus  Skin: No rashes or lesions on exposed skin    NST ( @ 1400)  Fetus A: baseline FHR 145s, moderate variability, +accels, -decels  Fetus B: baseline FHR 145s, moderate variability, +accels, -decels  [place]: no contractions    Labs:               7.8    6.28  )-----------( 298      ( 2024 13:00 )             25.3                         7.4    7.32  )-----------( 256      ( 2024 04:37 )             24.0                         9.3    7.47  )-----------( 265      ( 2024 04:31 )             29.5     01-    139  |  107  |  3.2<L>  ----------------------------<  78  3.5   |  21.0<L>  |  0.38<L>    Ca    8.4      2024 04:37    TPro  5.5<L>  /  Alb  2.9<L>  /  TBili  <0.2<L>  /  DBili  x   /  AST  11  /  ALT  5   /  AlkPhos  97      PT/INR - ( 2024 13:00 )   PT: 11.6 sec;   INR: 1.05 ratio         PTT - ( 2024 13:00 )  PTT:28.1 sec      Culture - Blood (collected 24 @ 15:35)  Source: .Blood Blood-Peripheral  Preliminary Report (24 @ 02:03):    No growth at 24 hours    Culture - Blood (collected 24 @ 15:32)  Source: .Blood Blood-Peripheral  Preliminary Report (24 @ 02:03):    No growth at 24 hours    Culture - Urine (collected 24 @ 15:10)  Source: Clean Catch Clean Catch (Midstream)  Final Report (24 @ 19:04):    <10,000 CFU/mL Normal Urogenital Meredith    Culture - Urine (collected 24 @ 13:00)  Source: Clean Catch Clean Catch (Midstream)  Final Report (24 @ 19:04):    <10,000 CFU/mL Normal Urogenital Meredith      MEDICATIONS  (STANDING):  prenatal multivitamin 1 Tablet(s) Oral daily    MEDICATIONS  (PRN):  diphenhydrAMINE 25 milliGRAM(s) Oral every 4 hours PRN Rash and/or Itching   ASHISH YANES   A 23year old  (Last Menstrual Period 2023 EDC 3/12/2024) at 30 weeks 0 days gestational age by first trimester ultrasound admitted to the antepartum service for a complicated UTI. HD#3    SUBJECTIVE:   No acute events overnight.  Patient currently asymptomatic.  Reports improvement in fatigue after blood transfusions.  Denies fevers, chills, lightheadedness, abdominal pain, urinary symptoms  Denies vaginal bleeding, contractions, and leakage of fluids.  Reports good fetal movements on both fetus.      This pregnancy is complicated by:  - recurrent UTIs; patient noted to have e. Coli x3 (2 in the outpatient setting and 1 requiring hospitalization w/ MICU admission for pressors).   - di-di twin gestation  - anemia    Vital Signs:  Vital Signs Last 24 Hrs  T(C): 36.7 (2024 04:11), Max: 37 (2024 09:50)  T(F): 98.1 (2024 04:11), Max: 98.6 (2024 09:50)  HR: 81 (2024 04:11) (81 - 94)  BP: 93/56 (2024 04:11) (90/51 - 111/66)  RR: 18 (2024 04:11) (16 - 18)  SpO2: 98% (2024 04:11) (96% - 100%)    Parameters below as of 2024 23:38  Patient On (Oxygen Delivery Method): room air      Physical Exam:  General: Adult female in NAD, AAOx3  Lungs: unlabored breathing  Abdomen: soft, non-tender, gravid uterus  Skin: No rashes or lesions on exposed skin    NST ( @ 1400)  Fetus A: baseline FHR 145s, moderate variability, +accels, -decels  Fetus B: baseline FHR 145s, moderate variability, +accels, -decels  [place]: no contractions    Labs:               7.8    6.28  )-----------( 298      ( 2024 13:00 )             25.3                         7.4    7.32  )-----------( 256      ( 2024 04:37 )             24.0                         9.3    7.47  )-----------( 265      ( 2024 04:31 )             29.5         139  |  107  |  3.2<L>  ----------------------------<  78  3.5   |  21.0<L>  |  0.38<L>    Ca    8.4      2024 04:37    TPro  5.5<L>  /  Alb  2.9<L>  /  TBili  <0.2<L>  /  DBili  x   /  AST  11  /  ALT  5   /  AlkPhos  97      PT/INR - ( 2024 13:00 )   PT: 11.6 sec;   INR: 1.05 ratio         PTT - ( 2024 13:00 )  PTT:28.1 sec      Culture - Blood (collected 24 @ 15:35)  Source: .Blood Blood-Peripheral  Preliminary Report (24 @ 02:03):    No growth at 24 hours    Culture - Blood (collected 24 @ 15:32)  Source: .Blood Blood-Peripheral  Preliminary Report (24 @ 02:03):    No growth at 24 hours    Culture - Urine (collected 24 @ 15:10)  Source: Clean Catch Clean Catch (Midstream)  Final Report (24 @ 19:04):    <10,000 CFU/mL Normal Urogenital Meredith    Culture - Urine (collected 24 @ 13:00)  Source: Clean Catch Clean Catch (Midstream)  Final Report (24 @ 19:04):    <10,000 CFU/mL Normal Urogenital Meredith      MEDICATIONS  (STANDING):  prenatal multivitamin 1 Tablet(s) Oral daily    MEDICATIONS  (PRN):  diphenhydrAMINE 25 milliGRAM(s) Oral every 4 hours PRN Rash and/or Itching   ASHISH YANES   A 23year old  (Last Menstrual Period 2023 EDC 3/12/2024) at 30 weeks 0 days gestational age by first trimester ultrasound admitted to the antepartum service for a complicated UTI. HD#3    SUBJECTIVE:   No acute events overnight.  Patient currently asymptomatic.  Reports improvement in fatigue after blood transfusions.  Denies fevers, chills, lightheadedness, abdominal pain, urinary symptoms  Denies vaginal bleeding, contractions, and leakage of fluids.  Reports good fetal movements on both fetus.      This pregnancy is complicated by:  - recurrent UTIs; patient noted to have e. Coli x3 (2 in the outpatient setting and 1 requiring hospitalization w/ MICU admission for pressors).   - di-di twin gestation  - anemia    Vital Signs:  Vital Signs Last 24 Hrs  T(C): 36.7 (2024 04:11), Max: 37 (2024 09:50)  T(F): 98.1 (2024 04:11), Max: 98.6 (2024 09:50)  HR: 81 (2024 04:11) (81 - 94)  BP: 93/56 (2024 04:11) (90/51 - 111/66)  RR: 18 (2024 04:11) (16 - 18)  SpO2: 98% (2024 04:11) (96% - 100%)    Parameters below as of 2024 23:38  Patient On (Oxygen Delivery Method): room air      Physical Exam:  General: Adult female in NAD, AAOx3  Lungs: unlabored breathing  Abdomen: soft, non-tender, gravid uterus  Skin: No rashes or lesions on exposed skin    NST ( @ 1400)  Fetus A: baseline FHR 145s, moderate variability, +accels, -decels  Fetus B: baseline FHR 145s, moderate variability, +accels, -decels  [place]: no contractions    Labs:               7.8    6.28  )-----------( 298      ( 2024 13:00 )             25.3                         7.4    7.32  )-----------( 256      ( 2024 04:37 )             24.0                         9.3    7.47  )-----------( 265      ( 2024 04:31 )             29.5         139  |  107  |  3.2<L>  ----------------------------<  78  3.5   |  21.0<L>  |  0.38<L>    Ca    8.4      2024 04:37    TPro  5.5<L>  /  Alb  2.9<L>  /  TBili  <0.2<L>  /  DBili  x   /  AST  11  /  ALT  5   /  AlkPhos  97      PT/INR - ( 2024 13:00 )   PT: 11.6 sec;   INR: 1.05 ratio         PTT - ( 2024 13:00 )  PTT:28.1 sec      Culture - Blood (collected 24 @ 15:35)  Source: .Blood Blood-Peripheral  Preliminary Report (24 @ 02:03):    No growth at 24 hours    Culture - Blood (collected 24 @ 15:32)  Source: .Blood Blood-Peripheral  Preliminary Report (24 @ 02:03):    No growth at 24 hours    Culture - Urine (collected 24 @ 15:10)  Source: Clean Catch Clean Catch (Midstream)  Final Report (24 @ 19:04):    <10,000 CFU/mL Normal Urogenital Meredith    Culture - Urine (collected 24 @ 13:00)  Source: Clean Catch Clean Catch (Midstream)  Final Report (24 @ 19:04):    <10,000 CFU/mL Normal Urogenital Meredith      MEDICATIONS  (STANDING):  prenatal multivitamin 1 Tablet(s) Oral daily    MEDICATIONS  (PRN):  diphenhydrAMINE 25 milliGRAM(s) Oral every 4 hours PRN Rash and/or Itching

## 2024-01-03 NOTE — PROGRESS NOTE ADULT - PROBLEM SELECTOR PLAN 4
Patient noted to have microcytic anemia on presentation, newly microcytic from lab work from last hospitalization in October. Iron studies performed. Patient received 2U RBCs with appropriate rise in hemoglobin. 7.4 > 9.3. Continue with prenatal vitamins.

## 2024-01-03 NOTE — PROGRESS NOTE ADULT - PROBLEM SELECTOR PLAN 3
Official bedside US was performed. Twin A MVP 4.41, inferior vertex, posterior placenta, EIF noted. Twin B MVP 6.1, superior breech, anterior placenta. US on 12/12/13 showed Twin A 996g, Twin B 966g, 3% discordance.   Twin gestations put the patient at higher risk of developed preeclampsia, gestational diabetes, and PTL.   Patient has not performed her GCT yet; UA noted to have protein and glucose. Advised GCT once patient is clinically well.
Official bedside US was performed on 1/1. Twin A MVP 4.41, inferior vertex, posterior placenta, EIF noted. Twin B MVP 6.1, superior breech, anterior placenta. US on 12/12/13 showed Twin A 996g, Twin B 966g, 3% discordance.   Twin gestations put the patient at higher risk of developed preeclampsia, gestational diabetes, and PTL.   Patient has not performed her GCT yet; UA noted to have protein and glucose. Advised GCT once patient is clinically well.

## 2024-01-03 NOTE — PROGRESS NOTE ADULT - PROBLEM SELECTOR PROBLEM 3
Dichorionic diamniotic twin pregnancy in third trimester
Dichorionic diamniotic twin pregnancy in third trimester

## 2024-01-03 NOTE — PROGRESS NOTE ADULT - ATTENDING COMMENTS
[facility] - IUP at 30 weeks admitted with dichorionic-diamniotic twin gestation complicated by symptomatic anemia and suspected UTI. No overnight events.  Reports feeling more energy after 2 unit PRBC transfusion yesterday. No OB complaint. No urinary complaint. Ambulating and voiding without complication. Urine and blood culture negative. Appropriate rise in H/H. Stable for outpatient management. Review of prenatal records reveals she has not had GCT or Tdap. Accepts Tdap today. Does not want to wait for GCT administration. Will need GCT and RSV vaccine at [facility]. Has follow up appt at [facility] on 1/10/24.  Reviewed the importance of nightly Macrobid therapy for UTI prophylaxis.  labor precautions reviewed and patient voiced understanding of counseling and recommendations.   Ivet Daniels MD  Maternal Fetal Medicine [facility] - IUP at 30 weeks admitted with dichorionic-diamniotic twin gestation complicated by symptomatic anemia and suspected UTI. No overnight events.  Reports feeling more energy after 2 unit PRBC transfusion yesterday. No OB complaint. No urinary complaint. Ambulating and voiding without complication. Urine and blood culture negative. Appropriate rise in H/H. Stable for outpatient management. Review of prenatal records reveals she has not had GCT or Tdap. Accepts Tdap today. Does not want to wait for GCT administration. Will need GCT and RSV vaccine at [facility]. Has follow up appt at [facility] on 1/10/24.  Reviewed the importance of nightly Macrobid therapy for UTI prophylaxis.  labor precautions reviewed and patient voiced understanding of counseling and recommendations.   Ivet Daniels MD  Maternal Fetal Medicine

## 2024-01-07 LAB
CULTURE RESULTS: SIGNIFICANT CHANGE UP
SPECIMEN SOURCE: SIGNIFICANT CHANGE UP

## 2024-01-10 ENCOUNTER — ASOB RESULT (OUTPATIENT)
Age: 24
End: 2024-01-10

## 2024-01-10 ENCOUNTER — APPOINTMENT (OUTPATIENT)
Dept: ANTEPARTUM | Facility: CLINIC | Age: 24
End: 2024-01-10
Payer: MEDICAID

## 2024-01-10 ENCOUNTER — APPOINTMENT (OUTPATIENT)
Dept: MATERNAL FETAL MEDICINE | Facility: CLINIC | Age: 24
End: 2024-01-10
Payer: MEDICAID

## 2024-01-10 VITALS
DIASTOLIC BLOOD PRESSURE: 60 MMHG | BODY MASS INDEX: 29.88 KG/M2 | HEART RATE: 80 BPM | HEIGHT: 64 IN | OXYGEN SATURATION: 99 % | RESPIRATION RATE: 18 BRPM | WEIGHT: 175 LBS | SYSTOLIC BLOOD PRESSURE: 96 MMHG

## 2024-01-10 VITALS
BODY MASS INDEX: 29.88 KG/M2 | HEIGHT: 64 IN | HEART RATE: 80 BPM | WEIGHT: 175 LBS | RESPIRATION RATE: 18 BRPM | SYSTOLIC BLOOD PRESSURE: 96 MMHG | OXYGEN SATURATION: 99 % | DIASTOLIC BLOOD PRESSURE: 60 MMHG

## 2024-01-10 DIAGNOSIS — O99.213 OBESITY COMPLICATING PREGNANCY, THIRD TRIMESTER: ICD-10-CM

## 2024-01-10 DIAGNOSIS — O99.891 OTHER SPECIFIED DISEASES AND CONDITIONS COMPLICATING PREGNANCY: ICD-10-CM

## 2024-01-10 DIAGNOSIS — O23.43 UNSPECIFIED INFECTION OF URINARY TRACT IN PREGNANCY, THIRD TRIMESTER: ICD-10-CM

## 2024-01-10 DIAGNOSIS — Z3A.31 31 WEEKS GESTATION OF PREGNANCY: ICD-10-CM

## 2024-01-10 DIAGNOSIS — O99.013 ANEMIA COMPLICATING PREGNANCY, THIRD TRIMESTER: ICD-10-CM

## 2024-01-10 DIAGNOSIS — O30.049 TWIN PREGNANCY, DICHORIONIC/DIAMNIOTIC, UNSPECIFIED TRIMESTER: ICD-10-CM

## 2024-01-10 DIAGNOSIS — O23.00 INFECTIONS OF KIDNEY IN PREGNANCY, UNSPECIFIED TRIMESTER: ICD-10-CM

## 2024-01-10 PROBLEM — N39.0 URINARY TRACT INFECTION, SITE NOT SPECIFIED: Chronic | Status: ACTIVE | Noted: 2024-01-01

## 2024-01-10 PROCEDURE — 76818 FETAL BIOPHYS PROFILE W/NST: CPT

## 2024-01-10 PROCEDURE — 76818 FETAL BIOPHYS PROFILE W/NST: CPT | Mod: 59

## 2024-01-10 PROCEDURE — 99215 OFFICE O/P EST HI 40 MIN: CPT | Mod: TH

## 2024-01-10 PROCEDURE — 76816 OB US FOLLOW-UP PER FETUS: CPT

## 2024-01-10 NOTE — ACTIVE PROBLEMS
[Diabetes Mellitus] : no diabetes mellitus [Hypertension] : no hypertension [Heart Disease] : no heart disease [Autoimmune Disease] : no autoimmune disease [Neurologic Disorder] : no neurologic disorder, no epilepsy [Psychiatric Disorders] : no psychiatric disorders [Depression] : no depression, no post partum depression [Hepatic Disorder] : no hepatitis, no liver disease [Thrombophlebitis] : no varicosities, no phlebitis [Thyroid Disorder] : no thyroid dysfunction [Trauma] : no trauma/violence [Blood Transfusion (___ Ml)] : no history of blood transfusion

## 2024-01-10 NOTE — DISCUSSION/SUMMARY
[FreeTextEntry1] : She is 31 weeks and 0 days gestation by early ultrasound dates.  She was hospitalized during the second trimester of pregnancy at [facility] because of urosepsis.  She has been diagnosed as having a complicated UTI. She is currently taking Macrobid 100 mg daily to prevent future UTIs.  She is overweight and obesity has been associated with a number of maternal complications such as gestational diabetes, pre-eclampsia, thrombophlebitis, labor abnormalities, post-term pregnancies,  delivery, and operative complications. Obesity has been associated with adverse fetal outcomes such as late stillbirth and  deliveries.  Obese women also have a two to three-fold increased incidence in congenital anomalies. There were no major fetal malformations seen during the fetal anatomy ultrasound examination.  She has not had the gestational diabetes screen test done.  I advised her to have a 1-hour Glucola challenge test done as soon as possible.  She told me that she is scheduled to have the 1-hour Glucola challenge test done on 2024.  She has been diagnosed with a dichorionic diamniotic twin pregnancy.  I told her that twins have an increased  morbidity and mortality mostly as a result of prematurity and congenital abnormalities.  I also told her that twin pregnancies are at risk for discordant fetal growth, and intrauterine growth restriction.  There are also maternal complications such as anemia for which I recommend daily iron supplementation and 1 mg of folic acid.  She is currently taking iron and folic acid supplementation.  I told her that twin pregnancies are also at increased risk for developing preeclampsia, gestational diabetes, urinary tract infections, and postpartum hemorrhage.  She told me that she has been taking a daily baby aspirin to decrease her risk of developing preeclampsia during pregnancy.  I told her that dichorionic/diamniotic pregnancies that are uncomplicated should delivered by 38 0/7 to 38 6/7 weeks of gestation (ACOG Committee Opinion No. 764, 2019).  I recommended that she reduce her activities, have frequent rest periods and no sexual intercourse. I recommended having weekly fetal surveillance with NST/BPP starting at 34 weeks of gestation.  She was diagnosed with severe anemia while she was hospitalized at [facility] on 2024.  She was transfused 2 units of blood. She was discharged home on iron supplementation.  I advised her to continue taking her prenatal vitamin and the prescribed iron supplementation.  I told her that spinach, lentils, dried fruit, tofu, red meat, poultry, and fish are dietary sources of iron. I told her that anemia during pregnancy increases the risk of having a  delivery or a low-birth-weight baby.

## 2024-01-10 NOTE — OB HISTORY
[Definite:  ___ (Date)] : the last menstrual period was [unfilled] [Normal Amount/Duration] : was of a normal amount and duration [Regular Cycle Intervals] : periods have been regular [Menstrual Cramps] : menstrual cramps [Pregnancy History] : boy [LMP: ___] : LMP: [unfilled] [BOBO: ___] : BOBO: [unfilled] [Spontaneous] : Spontaneous conception [___] : Living: [unfilled] [EGA: ___ wks] : EGA: [unfilled] wks [Spotting Between  Menses] : no spotting between menses [FreeTextEntry1] : First prenatal visit was on 8/3/2023.  She had a maternal-fetal medicine consultation on 11/9/2023 because she has a twin pregnancy and a history of urosepsis.  She was hospitalized January 1, 2024 at [facility] due to acute abdominal pain.  Which resolved during her hospitalization.

## 2024-01-10 NOTE — FAMILY HISTORY
[Age 35+ During Pregnancy] : not 35 or over during pregnancy [Reported Family History Of Birth Defects] : no congenital heart defects [Bayron-Sachs Carrier] : no Bayron-Sachs [Family History] : no mental retardation/autism [Reported Family History Of Genetic Disease] : no history of child defect in child of baby father

## 2024-01-10 NOTE — VITALS
[LMP (date): ___] : LMP was on [unfilled] [GA =___ Weeks] : which calculates to a GA of [unfilled] weeks [GA= ___ Days] : and [unfilled] day(s) [BOBO by LMP (date): ___] : The calculated BOBO by LMP is [unfilled] [By LMP] : this is the final BOBO

## 2024-01-18 PROBLEM — Z78.9 OTHER SPECIFIED HEALTH STATUS: Chronic | Status: INACTIVE | Noted: 2023-07-25 | Resolved: 2024-01-01

## 2024-01-30 ENCOUNTER — APPOINTMENT (OUTPATIENT)
Dept: ANTEPARTUM | Facility: CLINIC | Age: 24
End: 2024-01-30
Payer: MEDICAID

## 2024-01-30 ENCOUNTER — ASOB RESULT (OUTPATIENT)
Age: 24
End: 2024-01-30

## 2024-01-30 PROCEDURE — 76816 OB US FOLLOW-UP PER FETUS: CPT | Mod: 59

## 2024-01-30 PROCEDURE — 76818 FETAL BIOPHYS PROFILE W/NST: CPT | Mod: 59

## 2024-02-06 ENCOUNTER — ASOB RESULT (OUTPATIENT)
Age: 24
End: 2024-02-06

## 2024-02-06 ENCOUNTER — APPOINTMENT (OUTPATIENT)
Dept: ANTEPARTUM | Facility: CLINIC | Age: 24
End: 2024-02-06
Payer: MEDICAID

## 2024-02-06 PROCEDURE — 76818 FETAL BIOPHYS PROFILE W/NST: CPT | Mod: 59

## 2024-02-06 PROCEDURE — 76818 FETAL BIOPHYS PROFILE W/NST: CPT

## 2024-02-13 ENCOUNTER — APPOINTMENT (OUTPATIENT)
Dept: ANTEPARTUM | Facility: CLINIC | Age: 24
End: 2024-02-13

## 2024-02-16 ENCOUNTER — APPOINTMENT (OUTPATIENT)
Dept: ANTEPARTUM | Facility: CLINIC | Age: 24
End: 2024-02-16

## 2024-02-16 ENCOUNTER — ASOB RESULT (OUTPATIENT)
Age: 24
End: 2024-02-16

## 2024-02-20 ENCOUNTER — APPOINTMENT (OUTPATIENT)
Dept: ANTEPARTUM | Facility: CLINIC | Age: 24
End: 2024-02-20
Payer: MEDICAID

## 2024-02-20 PROCEDURE — 76818 FETAL BIOPHYS PROFILE W/NST: CPT | Mod: 59

## 2024-02-22 ENCOUNTER — APPOINTMENT (OUTPATIENT)
Dept: PEDIATRIC CARDIOLOGY | Facility: CLINIC | Age: 24
End: 2024-02-22
Payer: MEDICAID

## 2024-02-22 DIAGNOSIS — O35.BXX1 MATERNAL CARE FOR OTHER (SUSPECTED) FETAL ABNORMALITY AND DAMAGE, FETAL CARDIAC ANOMALIES, FETUS 1: ICD-10-CM

## 2024-02-22 DIAGNOSIS — O30.043 TWIN PREGNANCY, DICHORIONIC/DIAMNIOTIC, THIRD TRIMESTER: ICD-10-CM

## 2024-02-22 PROCEDURE — 76821 MIDDLE CEREBRAL ARTERY ECHO: CPT

## 2024-02-22 PROCEDURE — 93325 DOPPLER ECHO COLOR FLOW MAPG: CPT | Mod: 59

## 2024-02-22 PROCEDURE — 76820 UMBILICAL ARTERY ECHO: CPT

## 2024-02-22 PROCEDURE — 76820 UMBILICAL ARTERY ECHO: CPT | Mod: 59

## 2024-02-22 PROCEDURE — 76827 ECHO EXAM OF FETAL HEART: CPT | Mod: 59

## 2024-02-22 PROCEDURE — 76825 ECHO EXAM OF FETAL HEART: CPT

## 2024-02-22 PROCEDURE — 99204 OFFICE O/P NEW MOD 45 MIN: CPT | Mod: 25

## 2024-02-25 PROBLEM — O30.043 DICHORIONIC DIAMNIOTIC TWIN PREGNANCY IN THIRD TRIMESTER: Status: ACTIVE | Noted: 2024-01-10

## 2024-02-25 PROBLEM — O35.BXX1 FETAL CARDIAC ANOMALY COMPLICATING PREGNANCY, ANTEPARTUM, FETUS 1: Status: ACTIVE | Noted: 2024-02-20

## 2024-02-27 ENCOUNTER — APPOINTMENT (OUTPATIENT)
Dept: ANTEPARTUM | Facility: CLINIC | Age: 24
End: 2024-02-27

## 2024-02-27 RX ORDER — CHLORHEXIDINE GLUCONATE 213 G/1000ML
1 SOLUTION TOPICAL DAILY
Refills: 0 | Status: DISCONTINUED | OUTPATIENT
Start: 2024-02-28 | End: 2024-02-29

## 2024-02-27 RX ORDER — OXYTOCIN 10 UNIT/ML
333.33 VIAL (ML) INJECTION
Qty: 20 | Refills: 0 | Status: DISCONTINUED | OUTPATIENT
Start: 2024-02-28 | End: 2024-03-02

## 2024-02-27 RX ORDER — SODIUM CHLORIDE 9 MG/ML
1000 INJECTION, SOLUTION INTRAVENOUS
Refills: 0 | Status: DISCONTINUED | OUTPATIENT
Start: 2024-02-28 | End: 2024-02-29

## 2024-02-28 ENCOUNTER — INPATIENT (INPATIENT)
Facility: HOSPITAL | Age: 24
LOS: 2 days | Discharge: HOME | End: 2024-03-02
Attending: OBSTETRICS & GYNECOLOGY | Admitting: OBSTETRICS & GYNECOLOGY
Payer: COMMERCIAL

## 2024-02-28 VITALS — RESPIRATION RATE: 18 BRPM | TEMPERATURE: 98 F

## 2024-02-28 DIAGNOSIS — O30.049 TWIN PREGNANCY, DICHORIONIC/DIAMNIOTIC, UNSPECIFIED TRIMESTER: ICD-10-CM

## 2024-02-28 RX ORDER — CITRIC ACID/SODIUM CITRATE 300-500 MG
30 SOLUTION, ORAL ORAL ONCE
Refills: 0 | Status: COMPLETED | OUTPATIENT
Start: 2024-02-28 | End: 2024-02-28

## 2024-02-28 RX ADMIN — SODIUM CHLORIDE 125 MILLILITER(S): 9 INJECTION, SOLUTION INTRAVENOUS at 23:34

## 2024-02-29 ENCOUNTER — TRANSCRIPTION ENCOUNTER (OUTPATIENT)
Age: 24
End: 2024-02-29

## 2024-02-29 ENCOUNTER — RESULT REVIEW (OUTPATIENT)
Age: 24
End: 2024-02-29

## 2024-02-29 LAB
ANISOCYTOSIS BLD QL: SIGNIFICANT CHANGE UP
BASOPHILS # BLD AUTO: 0.01 K/UL — SIGNIFICANT CHANGE UP (ref 0–0.2)
BASOPHILS NFR BLD AUTO: 0.1 % — SIGNIFICANT CHANGE UP (ref 0–2)
BLD GP AB SCN SERPL QL: SIGNIFICANT CHANGE UP
ELLIPTOCYTES BLD QL SMEAR: SLIGHT — SIGNIFICANT CHANGE UP
EOSINOPHIL # BLD AUTO: 0.11 K/UL — SIGNIFICANT CHANGE UP (ref 0–0.5)
EOSINOPHIL NFR BLD AUTO: 1.4 % — SIGNIFICANT CHANGE UP (ref 0–6)
HCT VFR BLD CALC: 31.5 % — LOW (ref 34.5–45)
HGB BLD-MCNC: 10.3 G/DL — LOW (ref 11.5–15.5)
HIV 1 & 2 AB SERPL IA.RAPID: SIGNIFICANT CHANGE UP
HYPOCHROMIA BLD QL: SLIGHT — SIGNIFICANT CHANGE UP
IMM GRANULOCYTES NFR BLD AUTO: 0.5 % — SIGNIFICANT CHANGE UP (ref 0–0.9)
LYMPHOCYTES # BLD AUTO: 2.32 K/UL — SIGNIFICANT CHANGE UP (ref 1–3.3)
LYMPHOCYTES # BLD AUTO: 29.3 % — SIGNIFICANT CHANGE UP (ref 13–44)
MANUAL SMEAR VERIFICATION: SIGNIFICANT CHANGE UP
MCHC RBC-ENTMCNC: 25.9 PG — LOW (ref 27–34)
MCHC RBC-ENTMCNC: 32.7 GM/DL — SIGNIFICANT CHANGE UP (ref 32–36)
MCV RBC AUTO: 79.3 FL — LOW (ref 80–100)
MICROCYTES BLD QL: SIGNIFICANT CHANGE UP
MONOCYTES # BLD AUTO: 0.45 K/UL — SIGNIFICANT CHANGE UP (ref 0–0.9)
MONOCYTES NFR BLD AUTO: 5.7 % — SIGNIFICANT CHANGE UP (ref 2–14)
NEUTROPHILS # BLD AUTO: 5 K/UL — SIGNIFICANT CHANGE UP (ref 1.8–7.4)
NEUTROPHILS NFR BLD AUTO: 63 % — SIGNIFICANT CHANGE UP (ref 43–77)
OVALOCYTES BLD QL SMEAR: SLIGHT — SIGNIFICANT CHANGE UP
PLAT MORPH BLD: NORMAL — SIGNIFICANT CHANGE UP
PLATELET # BLD AUTO: 235 K/UL — SIGNIFICANT CHANGE UP (ref 150–400)
POIKILOCYTOSIS BLD QL AUTO: SLIGHT — SIGNIFICANT CHANGE UP
POLYCHROMASIA BLD QL SMEAR: SIGNIFICANT CHANGE UP
RBC # BLD: 3.97 M/UL — SIGNIFICANT CHANGE UP (ref 3.8–5.2)
RBC # FLD: 21.2 % — HIGH (ref 10.3–14.5)
RBC BLD AUTO: ABNORMAL
T PALLIDUM AB TITR SER: NEGATIVE — SIGNIFICANT CHANGE UP
WBC # BLD: 7.93 K/UL — SIGNIFICANT CHANGE UP (ref 3.8–10.5)
WBC # FLD AUTO: 7.93 K/UL — SIGNIFICANT CHANGE UP (ref 3.8–10.5)

## 2024-02-29 PROCEDURE — 59409 OBSTETRICAL CARE: CPT | Mod: U7,GC

## 2024-02-29 RX ORDER — KETOROLAC TROMETHAMINE 30 MG/ML
30 SYRINGE (ML) INJECTION ONCE
Refills: 0 | Status: DISCONTINUED | OUTPATIENT
Start: 2024-02-29 | End: 2024-02-29

## 2024-02-29 RX ORDER — IBUPROFEN 200 MG
600 TABLET ORAL EVERY 6 HOURS
Refills: 0 | Status: COMPLETED | OUTPATIENT
Start: 2024-02-29 | End: 2025-01-27

## 2024-02-29 RX ORDER — OXYCODONE HYDROCHLORIDE 5 MG/1
5 TABLET ORAL ONCE
Refills: 0 | Status: DISCONTINUED | OUTPATIENT
Start: 2024-02-29 | End: 2024-03-02

## 2024-02-29 RX ORDER — OXYTOCIN 10 UNIT/ML
41.67 VIAL (ML) INJECTION
Qty: 20 | Refills: 0 | Status: DISCONTINUED | OUTPATIENT
Start: 2024-02-29 | End: 2024-03-02

## 2024-02-29 RX ORDER — ACETAMINOPHEN 500 MG
975 TABLET ORAL
Refills: 0 | Status: DISCONTINUED | OUTPATIENT
Start: 2024-02-29 | End: 2024-03-02

## 2024-02-29 RX ORDER — OXYCODONE HYDROCHLORIDE 5 MG/1
5 TABLET ORAL
Refills: 0 | Status: DISCONTINUED | OUTPATIENT
Start: 2024-02-29 | End: 2024-03-02

## 2024-02-29 RX ORDER — DIPHENHYDRAMINE HCL 50 MG
25 CAPSULE ORAL EVERY 6 HOURS
Refills: 0 | Status: DISCONTINUED | OUTPATIENT
Start: 2024-02-29 | End: 2024-03-02

## 2024-02-29 RX ORDER — HYDROCORTISONE 1 %
1 OINTMENT (GRAM) TOPICAL EVERY 6 HOURS
Refills: 0 | Status: DISCONTINUED | OUTPATIENT
Start: 2024-02-29 | End: 2024-03-02

## 2024-02-29 RX ORDER — BENZOCAINE 10 %
1 GEL (GRAM) MUCOUS MEMBRANE EVERY 6 HOURS
Refills: 0 | Status: DISCONTINUED | OUTPATIENT
Start: 2024-02-29 | End: 2024-03-02

## 2024-02-29 RX ORDER — AER TRAVELER 0.5 G/1
1 SOLUTION RECTAL; TOPICAL EVERY 4 HOURS
Refills: 0 | Status: DISCONTINUED | OUTPATIENT
Start: 2024-02-29 | End: 2024-03-02

## 2024-02-29 RX ORDER — OXYTOCIN 10 UNIT/ML
2 VIAL (ML) INJECTION
Qty: 30 | Refills: 0 | Status: DISCONTINUED | OUTPATIENT
Start: 2024-02-29 | End: 2024-03-02

## 2024-02-29 RX ORDER — IBUPROFEN 200 MG
600 TABLET ORAL EVERY 6 HOURS
Refills: 0 | Status: DISCONTINUED | OUTPATIENT
Start: 2024-02-29 | End: 2024-03-02

## 2024-02-29 RX ORDER — MAGNESIUM HYDROXIDE 400 MG/1
30 TABLET, CHEWABLE ORAL
Refills: 0 | Status: DISCONTINUED | OUTPATIENT
Start: 2024-02-29 | End: 2024-03-02

## 2024-02-29 RX ORDER — SIMETHICONE 80 MG/1
80 TABLET, CHEWABLE ORAL EVERY 4 HOURS
Refills: 0 | Status: DISCONTINUED | OUTPATIENT
Start: 2024-02-29 | End: 2024-03-02

## 2024-02-29 RX ORDER — PRAMOXINE HYDROCHLORIDE 150 MG/15G
1 AEROSOL, FOAM RECTAL EVERY 4 HOURS
Refills: 0 | Status: DISCONTINUED | OUTPATIENT
Start: 2024-02-29 | End: 2024-03-02

## 2024-02-29 RX ORDER — LANOLIN
1 OINTMENT (GRAM) TOPICAL EVERY 6 HOURS
Refills: 0 | Status: DISCONTINUED | OUTPATIENT
Start: 2024-02-29 | End: 2024-03-02

## 2024-02-29 RX ORDER — SODIUM CHLORIDE 9 MG/ML
3 INJECTION INTRAMUSCULAR; INTRAVENOUS; SUBCUTANEOUS EVERY 8 HOURS
Refills: 0 | Status: DISCONTINUED | OUTPATIENT
Start: 2024-02-29 | End: 2024-03-02

## 2024-02-29 RX ORDER — DIBUCAINE 1 %
1 OINTMENT (GRAM) RECTAL EVERY 6 HOURS
Refills: 0 | Status: DISCONTINUED | OUTPATIENT
Start: 2024-02-29 | End: 2024-03-02

## 2024-02-29 RX ORDER — TETANUS TOXOID, REDUCED DIPHTHERIA TOXOID AND ACELLULAR PERTUSSIS VACCINE, ADSORBED 5; 2.5; 8; 8; 2.5 [IU]/.5ML; [IU]/.5ML; UG/.5ML; UG/.5ML; UG/.5ML
0.5 SUSPENSION INTRAMUSCULAR ONCE
Refills: 0 | Status: DISCONTINUED | OUTPATIENT
Start: 2024-02-29 | End: 2024-03-02

## 2024-02-29 RX ADMIN — Medication 125 MILLIUNIT(S)/MIN: at 17:10

## 2024-02-29 RX ADMIN — Medication 0.2 MILLIGRAM(S): at 17:28

## 2024-02-29 RX ADMIN — Medication 975 MILLIGRAM(S): at 21:20

## 2024-02-29 RX ADMIN — CHLORHEXIDINE GLUCONATE 1 APPLICATION(S): 213 SOLUTION TOPICAL at 12:01

## 2024-02-29 RX ADMIN — Medication 30 MILLIGRAM(S): at 19:08

## 2024-02-29 RX ADMIN — Medication 30 MILLIGRAM(S): at 19:03

## 2024-02-29 NOTE — DISCHARGE NOTE OB - CARE PROVIDER_API CALL
Tyshawn Ng  Obstetrics and Gynecology  [facility] [place] [address]  Phone: [phone]  Fax: [phone]  Follow Up Time:

## 2024-02-29 NOTE — DISCHARGE NOTE OB - NS MD DC FALL RISK RISK
For information on Fall & Injury Prevention, visit: [email or web address] OR  [email or web address] OR  https://www.cdc.gov/steadi/patient.html

## 2024-02-29 NOTE — OB PROVIDER IHI INDUCTION/AUGMENTATION NOTE - LABOR: CERVICAL DILATION
Endocrinology Clinic Progress Note    CC: Diabetes    HPI:  Av Bob is a 68 y.o. old patient who comes in today for routine follow up.     Type 2 diabetes: He is currently on Lantus 15 units at bedtime and Humalog 8-12 units with meals. He checks blood sugars 3-4 times a day. Fasting blood sugar in the morning is mostly in the range of 100 to 130. No hypoglycemia. Pre-meal blood sugar readings are mostly in 200s. He is currently off metformin. Creatinine in November was 1.58 and GFR 43, and repeat labs in December showed GFR 56 and creatinine 1.27, back to baseline. He has lithotripsy plans in the next several weeks, so I advised him to stay off metformin for now until after lithotripsy, and after confirmation of stable kidney function back to baseline.    Hypertension: Blood pressure is well controlled. He reports compliance with medications. He is on ACE inhibitor.    Hyperlipidemia: He is currently on fenofibrate and Lipitor. Cholesterol levels are goal.    ROS:  Constitutional: No unintentional weight loss  Endo: Denies excessive thirst or frequent urination    PMH:  Type 2 diabetes  Hypertension next line hyperlipidemia    EXAM:  Vital signs: /75   Pulse 62   Ht 1.829 m (6')   Wt 88.5 kg (195 lb)   SpO2 99%   BMI 26.45 kg/m²   General: No apparent distress, cooperative  Eyes: No scleral icterus, no discharge  Neck: Normal on external inspection  Resp: Normal effort  Psych: Alert and oriented, normal mood and affect    Assessment and Plan:    1. Type 2 diabetes mellitus with hyperglycemia, with long-term current use of insulin (CMS-[facility])  · Hemoglobin A1c today in the clinic is 7.6%  · Goal hemoglobin A1c less than 7.5%  · Continued Lantus 15 units at bedtime, when he runs out of current supply of Lantus he will go back to [facility] at the same dose  · Increased Humalog to 10 units 3 times a day with meals +2 additional units for every 50mg/dL BG above 150  · Repeat BMP twice, one week 
apart, to be done after lithotripsy; for now stay off metformin    2. Mixed hyperlipidemia  · Continue Lipitor and fenofibrate    3. Essential hypertension  · Blood pressure is well controlled    Return in about 3 months (around 4/15/2018).    Thank you for allowing me to participate in the care of this patient.    Trinidad Maguire M.D.    This note was created using voice recognition software (Dragon). The accuracy of the dictation is limited by the abilities of the software. I have reviewed the note prior to signing, however some errors in grammar and context are still possible. If you have any questions related to this note please do not hesitate to contact our office.     
Less than 1 cm
Less than 1 cm

## 2024-02-29 NOTE — OB PROVIDER LABOR PROGRESS NOTE - NS_SUBJECTIVE/OBJECTIVE_OBGYN_ALL_OB_FT
Patient seen and examined at bedside. She is doing well. No complaints at this time.
Patient seen and examined
Patient seen and examined at bedside due to feeling pressure
patient seen and examined for progress of labor  reports pressure

## 2024-02-29 NOTE — OB RN PATIENT PROFILE - DOES PATIENT HAVE ADVANCE DIRECTIVE
Head is normocephalic and atraumatic. No head tenderness or skull depression on palpation. No temporal cord-like sensation, increased warmth or tenderness. Pt is alert and oriented x 3, able to follow commands. No facial asymmetry. Pupils 5 mm equally round with PERRL, no nystagmus, EOM intact. Cranial nerves III-XII grossly intact. Coordination nose-to-finger intact. All limbs equally strong bilaterally 5/5. No pronator drift. No numbness or tingling sensation.  No spinal/paraspinal tenderness. Neck is non-tender, supple with full range of motion. No nuchal rigidity.
No

## 2024-02-29 NOTE — OB PROVIDER H&P - HISTORY OF PRESENT ILLNESS
23 year old  at 38w0d by U/S who presents to L&D for IOL for di-di twins. Denies contractions, vaginal bleeding, leakage of fluid. Endorses positive fetal movements. Denies acute complaints.     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 Feels safe at home     Sono/EFW :  Twin A mat. right, EFW 61%, 2428g, cephalic , post placenta, EIF, possible hypoplastic left heart. Twin B Breech, mat left, EFW 33%, 2215g, cephalic, ant placenta

## 2024-02-29 NOTE — DISCHARGE NOTE OB - MEDICATION SUMMARY - MEDICATIONS TO TAKE
I will START or STAY ON the medications listed below when I get home from the hospital:    ibuprofen 600 mg oral tablet  -- 1 tab(s) by mouth every 6 hours  -- Indication: For pain    Tylenol 325 mg oral tablet  -- 3 tab(s) by mouth every 6 hours  -- Indication: For pain    Prenatal Multivitamins with Folic Acid 1 mg oral tablet  -- 1 tab(s) by mouth once a day  -- Indication: For postpartum    ferrous sulfate 325 mg (65 mg elemental iron) oral tablet  -- 1 tab(s) by mouth once a day  -- Indication: For home med

## 2024-02-29 NOTE — OB PROVIDER LABOR PROGRESS NOTE - NS_OBIHIFHRDETAILS_OBGYN_ALL_OB_FT
Baseline Fetus A 130bpm, moderate variability, +accel, -decel  Fetus B baseline 140bpm, moderate variability, +accel, -decel
baseline 120s, moderate variability, +accels, -decels   baseline 130s, moderate variability, +accels, -decels
A: category 1  B: category 1
Baseline Fetus A 130bpm, moderate variability, +accel, -decel  Fetus B 135bpm, moderate variability, +accel, -decel

## 2024-02-29 NOTE — DISCHARGE NOTE OB - PATIENT PORTAL LINK FT
You can access the FollowMyHealth Patient Portal offered by [facility] by registering at the following website: [email or web address]. By joining [facility]’s FollowMyHealth portal, you will also be able to view your health information using other applications (apps) compatible with our system.

## 2024-02-29 NOTE — OB PROVIDER DELIVERY SUMMARY - NSPROVIDERDELIVERYNOTE_OBGYN_ALL_OB_FT
Patient found to be fully dilated and feeling urge to push. Transported to OR in preparation for pushing. Transferred to OR table, legs placed in stirrups, FHR on monitors, vaginal delivery tray open, and  delivery tray open. With maternal pushing efforts  at 1654 of a live female infant with Apgars 9/9. Delivered JAMA, loose nuchal cord x1 reduced. Infant's head delivered with maternal expulsive efforts. Shoulders delivered without difficulty followed by the rest of the body. Baby handed to patient. Nose and mouth were bulb suctioned. Cord clamped and cut after delay.     Baby B position confirmed to be cephalic OP by bedside sono. Patient reexamined and found to be 7-8cm, but soft and able to be stretched. Baby B AROM with rapid dilation of cervix to fully dilated. With maternal pushing efforts  at 1708 of a live male infant with Apgars */*. Delivered ROP, nuchal cord x2. Infant's head delivered with maternal expulsive efforts. Shoulder delivered without difficulty followed by rest of body. Nuchal cord x2 reduced. Baby handed to patient. Nose and mouth were bulb suctioned. Cord clamped and cut after delay.     Cervix, perineum and vagina were inspected and 1st degree laceration was noted and repaired with Vicryl. Both placentas delivered spontaneously, intact at 1710. Pitocin started. Excellent hemostasis achieved. EBL 250cc. Continued cervical dilation and lower uterine segment atony - given IM methergine, rectal cytotec with continued bimanual massage and fundal massage. Tone improved and bleeding minimal. Pt tolerated procedure well, in stable condition, recovering in LDR. Infant in LDR. Instrument/sponge count correct x 2 and confirmed by nurse. Patient found to be fully dilated and feeling urge to push. Transported to OR in preparation for pushing. Transferred to OR table, legs placed in stirrups, FHR on monitors, vaginal delivery tray open, and  delivery tray open. With maternal pushing efforts  at 1654 of a live female infant with Apgars 9/9. Delivered JAMA, loose nuchal cord x1 reduced. Infant's head delivered with maternal expulsive efforts. Shoulders delivered without difficulty followed by the rest of the body. Baby handed to patient. Nose and mouth were bulb suctioned. Cord clamped and cut after delay.     Baby B position confirmed to be cephalic OP by bedside sono. Patient reexamined and found to be 7-8cm, but soft and able to be stretched. Baby B AROM with rapid dilation of cervix to fully dilated. With maternal pushing efforts  at 1708 of a live male infant. Delivered ROP, nuchal cord x2. Infant's head delivered with maternal expulsive efforts. Shoulder delivered without difficulty followed by rest of body. Nuchal cord x2 reduced. Baby handed to patient. Nose and mouth were bulb suctioned. Cord clamped and cut after delay.     Cervix, perineum and vagina were inspected and 1st degree laceration was noted and repaired with Vicryl. Both placentas delivered spontaneously, intact at 1710. Pitocin started. Excellent hemostasis achieved. EBL 250cc. Continued cervical dilation and lower uterine segment atony - given IM methergine, rectal cytotec with continued bimanual massage and fundal massage. Tone improved and bleeding minimal. Pt tolerated procedure well, in stable condition, recovering in LDR. Infant in LDR. Instrument/sponge count correct x 2 and confirmed by nurse.    [facility] on-call attending:  resident note reviewed, uncomplicated vaginal delivery of twins, lower uterine segment atony improved with methergine x1, rectal cytotec, and massage.    Liane Diana MD

## 2024-02-29 NOTE — OB PROVIDER DELIVERY SUMMARY - NSSELHIDDEN_OBGYN_ALL_OB_FT
[NS_[email or web address]_ALL_OB_FT:[id number]],[NS_DeliveryAssist1_OBGYN_ALL_OB_FT:[id number]],[NS_DeliveryRN_OBGYN_ALL_OB_FT:[id number]],[NS_DeliveryAssist2_OBGYN_ALL_OB_FT:[id number]]

## 2024-02-29 NOTE — OB RN PATIENT PROFILE - FALL HARM RISK - UNIVERSAL INTERVENTIONS
Bed in lowest position, wheels locked, appropriate side rails in place/Call bell, personal items and telephone in reach/Instruct patient to call for assistance before getting out of bed or chair/Non-slip footwear when patient is out of bed/Egan to call system/Physically safe environment - no spills, clutter or unnecessary equipment/Purposeful Proactive Rounding/Room/bathroom lighting operational, light cord in reach

## 2024-02-29 NOTE — OB PROVIDER LABOR PROGRESS NOTE - ASSESSMENT
Exam as above  Patient desires epidural at this time  Plan to transition patient to pitocin once ctx space  
Pt admitted for induction of labor secondary to di/di twins   -VSS   -Cat 1 tracing   -Continue expectant management 
will reassess labor progress  anticipate vaginal delivery  anesthesia, nicu aware  will prepare room  
Exam as above  Membranes intact  Expt mgmt for now

## 2024-02-29 NOTE — OB NEONATOLOGY/PEDIATRICIAN DELIVERY SUMMARY - NSPEDSNEONOTESA_OBGYN_ALL_OB_FT
Met with patient  Explained role of care management  Patient lives in a one story home with spouse  3 [address]  She is indpendent adl's and ambulation, drives, works  DME - w/c, RW, BSC, nebulizer, cane  Past services - [facility], [facility]  She plans on returning home at discharge and does not anticipate any discharge needs  Will follow  Requested by DR Alvarez  to attend a  of a ....y/o G..P.. at  38.1 weeks GA secondary to twin delivery  She had + PNC, is blood type......, HIV neg, HBsAg neg, Treponema Antibody neg, Rubella Imm, GBS neg..., COVID19...  L&D:  ROM .....  Baby born .... with good cry, transferred to warmer, orally suctioned, dried, and examined. Infant showed to father and then transferred to mother for STS.  A/P:  FT Di-Di Twin  Transfer to NICU for further evaluation and management. Requested by DR Alvarez  to attend a  of a 24y/o  at  38.1 weeks GA secondary to twin delivery  She had + PNC, is blood type A pos, HIV neg, HBsAg neg, Treponema Antibody neg, Rubella Imm, GBS neg, hx of fetal echo with.  L&D:  AROM aprox 4 hrs PTD with clear fluids.  Baby born vertex with loose nuchal cord, good cry, DCC x 1min, transferred to warmer, dried, and examined. Infant showed to father and then transferred to mother for STS.  BW: 2970g  A/P:  FT Di-Di Twin A  Transfer to NICU for further evaluation and management as per Peds cardiology Requested by DR Alvarez  to attend a  of a 24y/o  at  38.1 weeks GA secondary to twin delivery  She had + PNC, is blood type A pos, HIV neg, HBsAg neg, Treponema Antibody neg, Rubella Imm, GBS neg, hx of fetal echo with.  L&D:  AROM aprox 4 hrs PTD with clear fluids.  Baby born vertex with loose nuchal cord, good cry, DCC x 1min, transferred to warmer, dried, and examined. Infant showed to father and then transferred to mother for STS.  BW: 2970g  A/P:  FT BG Di-Di Twin A  Transfer to NICU for further evaluation and management as per Peds cardiology Requested by DR Diana  to attend a  of a 22y/o  at  38.1 weeks GA secondary to twin delivery  She had + PNC, is blood type A pos, HIV neg, HBsAg neg, Treponema Antibody neg, Rubella Imm, GBS neg, hx of fetal echo with finding of mild Rt/Lt heart size discrepancy, with relatively small Lt ventricle.  The LV appears narrow compared to the Rt ventricle, but it is apex forming, atrial septal aneurysm (does bot appear obstructive), mildly dilated Rt atrium, Lt atrium is relatively small compared to Rt atrium  L&D:  AROM aprox 4 hrs PTD with clear fluids.  Baby born vertex with loose nuchal cord, good cry, DCC x 1min, transferred to warmer, dried, and examined. Infant showed to father and then transferred to mother for STS.  BW: 2970g  A/P:  FT BG Di-Di Twin A with fetal echo as above  Transfer to NICU for further evaluation and management as per Peds cardiology after bonding with parents

## 2024-02-29 NOTE — OB PROVIDER H&P - ASSESSMENT
A/P: 23y  @ 38w1d with di/di twin gestation admitted for IOL   -Admit to L&D  -Consent  -Admission labs  -NPO, except ice chips   -IV fluids  -Labor: Intact, cervix closed, plan for buccal cytotec for IOL  -Fetus: Cat I tracing. Continuous toco and fetal monitoring.   -GBS: Negative, no GBS ppx required   -Analgesia: prn  -DVT ppx: Ambulate and SCD's while in bed     Discussed with Dr. Menon

## 2024-02-29 NOTE — DISCHARGE NOTE OB - CARE PLAN
1 Principal Discharge DX:	Twin pregnancy delivered vaginally  Assessment and plan of treatment:	Please call your provider in 1 week. Take medications as directed, regular diet, activity as tolerated. Exclusive breast feeding for the first 6 months is recommended. Nothing per vagina for 6 weeks (incl. sex, douching, etc). If you have additional concerns, please inform your provider.

## 2024-02-29 NOTE — DISCHARGE NOTE OB - MEDICATION SUMMARY - MEDICATIONS TO STOP TAKING
I will STOP taking the medications listed below when I get home from the hospital:    Macrobid 100 mg oral capsule  -- 1 cap(s) by mouth once a day

## 2024-02-29 NOTE — DISCHARGE NOTE OB - NS AS DC FOLLOWUP INST YN
Congestion    Babies can often have congestion, or a \"stuffy nose\". This does not necessarily mean that they are sick.     Tips for  congestion:  - Place a humidifier in their room at night.  congestion can often worsen at night  - Suction their nose as needed with a bulb or Nose Melanie.   - If their congestion is interfering with feeds, try suction right before they feed  - If you have difficulty suctioning out their secretions, you can try over the counter saline drops in their nose before you suction.     Contact your doctor if you notice any of the following:  - New onset fevers or cough  - Congestion/runny nose that is not improving in 1-2 weeks  - If they are taking less feeds, making less wet diapers, crying without tears    Present to an ER if you notice:  - Fast breathing that is sustained for several minutes and does not improve with suctioning  - If your child looks like they are having a hard time breathing: using their belly to breathe, skin near their ribs or neck sucking in when they breathe  - Your child turns blue  - You are unable to wake your child      
Yes

## 2024-02-29 NOTE — OB RN DELIVERY SUMMARY - BABY A: VOID IN DELIVERY
Low Back Strain With Rehab  A strain is an injury in which a tendon or muscle is torn. The muscles and tendons of the lower back are vulnerable to strains. However, these muscles and tendons are very strong and require a great force to be injured. Strains are classified into three categories. Grade 1 strains cause pain, but the tendon is not lengthened. Grade 2 strains include a lengthened ligament, due to the ligament being stretched or partially ruptured. With grade 2 strains there is still function, although the function may be decreased. Grade 3 strains involve a complete tear of the tendon or muscle, and function is usually impaired.  SYMPTOMS   · Pain in the lower back.  · Pain that affects one side more than the other.  · Pain that gets worse with movement and may be felt in the hip, buttocks, or back of the thigh.  · Muscle spasms of the muscles in the back.  · Swelling along the muscles of the back.  · Loss of strength of the back muscles.  · Crackling sound (crepitation) when the muscles are touched.  CAUSES   Lower back strains occur when a force is placed on the muscles or tendons that is greater than they can handle. Common causes of injury include:  · Prolonged overuse of the muscle-tendon units in the lower back, usually from incorrect posture.  · A single violent injury or force applied to the back.  RISK INCREASES WITH:  · Sports that involve twisting forces on the spine or a lot of bending at the waist (football, rugby, weightlifting, bowling, golf, tennis, speed skating, racquetball, swimming, running, gymnastics, diving).  · Poor strength and flexibility.  · Failure to warm up properly before activity.  · Family history of lower back pain or disk disorders.  · Previous back injury or surgery (especially fusion).  · Poor posture with lifting, especially heavy objects.  · Prolonged sitting, especially with poor posture.  PREVENTION   · Learn and use proper posture when sitting or lifting (maintain  proper posture when sitting, lift using the knees and legs, not at the waist).  · Warm up and stretch properly before activity.  · Allow for adequate recovery between workouts.  · Maintain physical fitness:    Strength, flexibility, and endurance.    Cardiovascular fitness.  PROGNOSIS   If treated properly, lower back strains usually heal within 6 weeks.  RELATED COMPLICATIONS   · Recurring symptoms, resulting in a chronic problem.  · Chronic inflammation, scarring, and partial muscle-tendon tear.  · Delayed healing or resolution of symptoms.  · Prolonged disability.  TREATMENT   Treatment first involves the use of ice and medicine, to reduce pain and inflammation. The use of strengthening and stretching exercises may help reduce pain with activity. These exercises may be performed at home or with a therapist. Severe injuries may require referral to a therapist for further evaluation and treatment, such as ultrasound. Your caregiver may advise that you wear a back brace or corset, to help reduce pain and discomfort. Often, prolonged bed rest results in greater harm then benefit. Corticosteroid injections may be recommended. However, these should be reserved for the most serious cases. It is important to avoid using your back when lifting objects. At night, sleep on your back on a firm mattress with a pillow placed under your knees. If non-surgical treatment is unsuccessful, surgery may be needed.   MEDICATION   · If pain medicine is needed, nonsteroidal anti-inflammatory medicines (aspirin and ibuprofen), or other minor pain relievers (acetaminophen), are often advised.  · Do not take pain medicine for 7 days before surgery.  · Prescription pain relievers may be given, if your caregiver thinks they are needed. Use only as directed and only as much as you need.  · Ointments applied to the skin may be helpful.  · Corticosteroid injections may be given by your caregiver. These injections should be reserved for the most  serious cases, because they may only be given a certain number of times.  HEAT AND COLD  · Cold treatment (icing) should be applied for 10 to 15 minutes every 2 to 3 hours for inflammation and pain, and immediately after activity that aggravates your symptoms. Use ice packs or an ice massage.  · Heat treatment may be used before performing stretching and strengthening activities prescribed by your caregiver, physical therapist, or . Use a heat pack or a warm water soak.  SEEK MEDICAL CARE IF:   · Symptoms get worse or do not improve in 2 to 4 weeks, despite treatment.  · You develop numbness, weakness, or loss of bowel or bladder function.  · New, unexplained symptoms develop. (Drugs used in treatment may produce side effects.)      EXERCISES   RANGE OF MOTION (ROM) AND STRETCHING EXERCISES - Low Back Strain  Most people with lower back pain will find that their symptoms get worse with excessive bending forward (flexion) or arching at the lower back (extension). The exercises which will help resolve your symptoms will focus on the opposite motion.   Your physician, physical therapist or  will help you determine which exercises will be most helpful to resolve your lower back pain. Do not complete any exercises without first consulting with your caregiver. Discontinue any exercises which make your symptoms worse until you speak to your caregiver.   If you have pain, numbness or tingling which travels down into your buttocks, leg or foot, the goal of the therapy is for these symptoms to move closer to your back and eventually resolve. Sometimes, these leg symptoms will get better, but your lower back pain may worsen. This is typically an indication of progress in your rehabilitation. Be very alert to any changes in your symptoms and the activities in which you participated in the 24 hours prior to the change. Sharing this information with your caregiver will allow him/her to most  efficiently treat your condition.  · These exercises may help you when beginning to rehabilitate your injury. Your symptoms may resolve with or without further involvement from your physician, physical therapist or . While completing these exercises, remember:  · Restoring tissue flexibility helps normal motion to return to the joints. This allows healthier, less painful movement and activity.  · An effective stretch should be held for at least 30 seconds.  · A stretch should never be painful. You should only feel a gentle lengthening or release in the stretched tissue.  FLEXION RANGE OF MOTION AND STRETCHING EXERCISES:  STRETCH - Flexion, Single Knee to Chest   · Lie on a firm bed or floor with both legs extended in front of you.  · Keeping one leg in contact with the floor, bring your opposite knee to your chest. Hold your leg in place by either grabbing behind your thigh or at your knee.  · Pull until you feel a gentle stretch in your lower back. Hold 5-10 seconds.  · Slowly release your grasp and repeat the exercise with the opposite side.  Repeat 3-5 times. Complete this exercise 2 times per day.   STRETCH - Flexion, Double Knee to Chest   · Lie on a firm bed or floor with both legs extended in front of you.  · Keeping one leg in contact with the floor, bring your opposite knee to your chest.  · Tense your stomach muscles to support your back and then lift your other knee to your chest. Hold your legs in place by either grabbing behind your thighs or at your knees.  · Pull both knees toward your chest until you feel a gentle stretch in your lower back. Hold 5-10 seconds.  · Tense your stomach muscles and slowly return one leg at a time to the floor.  Repeat 3-5 times. Complete this exercise 2 times per day.   STRETCH - Low Trunk Rotation  · Lie on a firm bed or floor. Keeping your legs in front of you, bend your knees so they are both pointed toward the ceiling and your feet are flat on the  floor.  · Extend your arms out to the side. This will stabilize your upper body by keeping your shoulders in contact with the floor.  · Gently and slowly drop both knees together to one side until you feel a gentle stretch in your lower back. Hold for 5-10 seconds.  · Tense your stomach muscles to support your lower back as you bring your knees back to the starting position. Repeat the exercise to the other side.  Repeat 3-5 times. Complete this exercise 2 times per day   EXTENSION RANGE OF MOTION AND FLEXIBILITY EXERCISES:  STRETCH - Extension, Prone on Elbows   · Lie on your stomach on the floor, a bed will be too soft. Place your palms about shoulder width apart and at the height of your head.  · Place your elbows under your shoulders. If this is too painful, stack pillows under your chest.  · Allow your body to relax so that your hips drop lower and make contact more completely with the floor.  · Hold this position for 5-10 seconds.  · Slowly return to lying flat on the floor.  Repeat 3-5 times. Complete this exercise 2 times per day.   RANGE OF MOTION - Extension, Prone Press Ups  · Lie on your stomach on the floor, a bed will be too soft. Place your palms about shoulder width apart and at the height of your head.  · Keeping your back as relaxed as possible, slowly straighten your elbows while keeping your hips on the floor. You may adjust the placement of your hands to maximize your comfort. As you gain motion, your hands will come more underneath your shoulders.  · Hold this position 5-10 seconds.  · Slowly return to lying flat on the floor.  Repeat 3-5 times. Complete this exercise 2 times per day.   RANGE OF MOTION- Quadruped, Neutral Spine   · Assume a hands and knees position on a firm surface. Keep your hands under your shoulders and your knees under your hips. You may place padding under your knees for comfort.  · Drop your head and point your tail bone toward the ground below you. This will round out  "your lower back like an angry cat. Hold this position for 5-10 seconds.  · Slowly lift your head and release your tail bone so that your back sags into a large arch, like an old horse.  · Hold this position for 5-10 seconds.  · Repeat this until you feel limber in your lower back.  · Now, find your \"sweet spot.\" This will be the most comfortable position somewhere between the two previous positions. This is your neutral spine. Once you have found this position, tense your stomach muscles to support your lower back.  · Hold this position for 5-10 seconds.  Repeat 3-5 times. Complete this exercise 2 times per day.       STRENGTHENING EXERCISES - Low Back Strain  These exercises may help you when beginning to rehabilitate your injury. These exercises should be done near your \"sweet spot.\" This is the neutral, low-back arch, somewhere between fully rounded and fully arched, that is your least painful position. When performed in this safe range of motion, these exercises can be used for people who have either a flexion or extension based injury. These exercises may resolve your symptoms with or without further involvement from your physician, physical therapist or . While completing these exercises, remember:   · Muscles can gain both the endurance and the strength needed for everyday activities through controlled exercises.  · Complete these exercises as instructed by your physician, physical therapist or . Increase the resistance and repetitions only as guided.  · You may experience muscle soreness or fatigue, but the pain or discomfort you are trying to eliminate should never worsen during these exercises. If this pain does worsen, stop and make certain you are following the directions exactly. If the pain is still present after adjustments, discontinue the exercise until you can discuss the trouble with your caregiver.  STRENGTHENING - Deep Abdominals, Pelvic Tilt  · Lie on a firm bed " or floor. Keeping your legs in front of you, bend your knees so they are both pointed toward the ceiling and your feet are flat on the floor.  · Tense your lower abdominal muscles to press your lower back into the floor. This motion will rotate your pelvis so that your tail bone is scooping upwards rather than pointing at your feet or into the floor.  · With a gentle tension and even breathing, hold this position for 5 seconds.  Repeat 5 times. Complete this exercise 1 times per day.   STRENGTHENING - Abdominals, Crunches   · Lie on a firm bed or floor. Keeping your legs in front of you, bend your knees so they are both pointed toward the ceiling and your feet are flat on the floor. Cross your arms over your chest.  · Slightly tip your chin down without bending your neck.  · Tense your abdominals and slowly lift your trunk high enough to just clear your shoulder blades. Lifting higher can put excessive stress on the lower back and does not further strengthen your abdominal muscles.  · Control your return to the starting position.  Repeat 5-10 times. Complete this exercise 1 times per day.   STRENGTHENING - Quadruped, Opposite UE/LE Lift   · Assume a hands and knees position on a firm surface. Keep your hands under your shoulders and your knees under your hips. You may place padding under your knees for comfort.  · Find your neutral spine and gently tense your abdominal muscles so that you can maintain this position. Your shoulders and hips should form a rectangle that is parallel with the floor and is not twisted.  · Keeping your trunk steady, lift your right hand no higher than your shoulder and then your left leg no higher than your hip. Make sure you are not holding your breath. Hold this position 5-10 seconds.  · Continuing to keep your abdominal muscles tense and your back steady, slowly return to your starting position. Repeat with the opposite arm and leg.  Repeat 3-5 times. Complete this exercise 2 times per  day.   STRENGTHENING - Lower Abdominals, Double Knee Lift  · Lie on a firm bed or floor. Keeping your legs in front of you, bend your knees so they are both pointed toward the ceiling and your feet are flat on the floor.  · Tense your abdominal muscles to brace your lower back and slowly lift both of your knees until they come over your hips. Be certain not to hold your breath.  · Hold 5-10 seconds. Using your abdominal muscles, return to the starting position in a slow and controlled manner.  Repeat 3-5 times. Complete this exercise 2 times per day.   POSTURE AND BODY MECHANICS CONSIDERATIONS - Low Back Strain  Keeping correct posture when sitting, standing or completing your activities will reduce the stress put on different body tissues, allowing injured tissues a chance to heal and limiting painful experiences. The following are general guidelines for improved posture. Your physician or physical therapist will provide you with any instructions specific to your needs. While reading these guidelines, remember:  · The exercises prescribed by your provider will help you have the flexibility and strength to maintain correct postures.  · The correct posture provides the best environment for your joints to work. All of your joints have less wear and tear when properly supported by a spine with good posture. This means you will experience a healthier, less painful body.  · Correct posture must be practiced with all of your activities, especially prolonged sitting and standing. Correct posture is as important when doing repetitive low-stress activities (typing) as it is when doing a single heavy-load activity (lifting).  RESTING POSITIONS  Consider which positions are most painful for you when choosing a resting position. If you have pain with flexion-based activities (sitting, bending, stooping, squatting), choose a position that allows you to rest in a less flexed posture. You would want to avoid curling into a fetal  position on your side. If your pain worsens with extension-based activities (prolonged standing, working overhead), avoid resting in an extended position such as sleeping on your stomach. Most people will find more comfort when they rest with their spine in a more neutral position, neither too rounded nor too arched. Lying on a non-sagging bed on your side with a pillow between your knees, or on your back with a pillow under your knees will often provide some relief. Keep in mind, being in any one position for a prolonged period of time, no matter how correct your posture, can still lead to stiffness.  PROPER SITTING POSTURE  In order to minimize stress and discomfort on your spine, you must sit with correct posture. Sitting with good posture should be effortless for a healthy body. Returning to good posture is a gradual process. Many people can work toward this most comfortably by using various supports until they have the flexibility and strength to maintain this posture on their own.  When sitting with proper posture, your ears will fall over your shoulders and your shoulders will fall over your hips. You should use the back of the chair to support your upper back. Your lower back will be in a neutral position, just slightly arched. You may place a small pillow or folded towel at the base of your lower back for support.   When working at a desk, create an environment that supports good, upright posture. Without extra support, muscles tire, which leads to excessive strain on joints and other tissues. Keep these recommendations in mind:  CHAIR:  · A chair should be able to slide under your desk when your back makes contact with the back of the chair. This allows you to work closely.  · The chair's height should allow your eyes to be level with the upper part of your monitor and your hands to be slightly lower than your elbows.  BODY POSITION  · Your feet should make contact with the floor. If this is not possible,  use a foot rest.  · Keep your ears over your shoulders. This will reduce stress on your neck and lower back.  INCORRECT SITTING POSTURES   If you are feeling tired and unable to assume a healthy sitting posture, do not slouch or slump. This puts excessive strain on your back tissues, causing more damage and pain. Healthier options include:  · Using more support, like a lumbar pillow.  · Switching tasks to something that requires you to be upright or walking.  · Talking a brief walk.  · Lying down to rest in a neutral-spine position.  PROLONGED STANDING WHILE SLIGHTLY LEANING FORWARD   When completing a task that requires you to lean forward while standing in one place for a long time, place either foot up on a stationary 2-4 inch high object to help maintain the best posture. When both feet are on the ground, the lower back tends to lose its slight inward curve. If this curve flattens (or becomes too large), then the back and your other joints will experience too much stress, tire more quickly, and can cause pain.  CORRECT STANDING POSTURES  Proper standing posture should be assumed with all daily activities, even if they only take a few moments, like when brushing your teeth. As in sitting, your ears should fall over your shoulders and your shoulders should fall over your hips. You should keep a slight tension in your abdominal muscles to brace your spine. Your tailbone should point down to the ground, not behind your body, resulting in an over-extended swayback posture.   INCORRECT STANDING POSTURES   Common incorrect standing postures include a forward head, locked knees and/or an excessive swayback.  WALKING  Walk with an upright posture. Your ears, shoulders and hips should all line-up.  PROLONGED ACTIVITY IN A FLEXED POSITION  When completing a task that requires you to bend forward at your waist or lean over a low surface, try to find a way to stabilize 3 out of 4 of your limbs. You can place a hand or elbow  on your thigh or rest a knee on the surface you are reaching across. This will provide you more stability so that your muscles do not fatigue as quickly. By keeping your knees relaxed, or slightly bent, you will also reduce stress across your lower back.  CORRECT LIFTING TECHNIQUES  DO :   · Assume a wide stance. This will provide you more stability and the opportunity to get as close as possible to the object which you are lifting.  · Tense your abdominals to brace your spine. Bend at the knees and hips. Keeping your back locked in a neutral-spine position, lift using your leg muscles. Lift with your legs, keeping your back straight.  · Test the weight of unknown objects before attempting to lift them.  · Try to keep your elbows locked down at your sides in order get the best strength from your shoulders when carrying an object.  · Always ask for help when lifting heavy or awkward objects.  INCORRECT LIFTING TECHNIQUES  DO NOT:   · Lock your knees when lifting, even if it is a small object.  · Bend and twist. Pivot at your feet or move your feet when needing to change directions.  · Assume that you can safely  even a paper clip without proper posture.     This information is not intended to replace advice given to you by your health care provider. Make sure you discuss any questions you have with your health care provider.     Document Released: 12/18/2006 Document Revised: 01/08/2016 Document Reviewed: 04/01/2010  [employer]® Patient Information ©2016 [employer].       no

## 2024-02-29 NOTE — OB RN PATIENT PROFILE - NSMATERNALFETALCONCERNS_OBGYN_ALL_OB_FT
Fetal Alert  S/P [facility] 2/22/2024-Recommendations: Consider Admission to NICU for Twin A and Twin B-Check 4 Extremitiy BP, Pre/Post Ductal Oxygen Saturation, and Contact Peds Cardio to Discuss Consultation Timing. SHANTANU Chavarria RN-BC

## 2024-02-29 NOTE — OB RN DELIVERY SUMMARY - BABYS CARE PROVIDER NAME, OB PROFILE
Ijeoma Rasheed 5th [facility] Ijeoma Rasheed 5th [facility]

## 2024-02-29 NOTE — OB RN DELIVERY SUMMARY - NSSELHIDDEN_OBGYN_ALL_OB_FT
[NS_DeliveryAttending1_OBGYN_ALL_OB_FT:[email or web address]=],[NS_DeliveryAssist1_OBGYN_ALL_OB_FT:[id number]=],[NS_DeliveryRN_OBGYN_ALL_OB_FT:[id number]] [NS_DeliveryAttending1_OBGYN_ALL_OB_FT:[email or web address]=],[NS_DeliveryAssist1_OBGYN_ALL_OB_FT:[id number]],[NS_DeliveryRN_OBGYN_ALL_OB_FT:[id number]],[NS_DeliveryAssist2_OBGYN_ALL_OB_FT:[id number]=]

## 2024-02-29 NOTE — OB RN PATIENT PROFILE - WEIGHT METHOD
Erythromycin Pregnancy And Lactation Text: This medication is Pregnancy Category B and is considered safe during pregnancy. It is also excreted in breast milk. stated

## 2024-02-29 NOTE — OB PROVIDER H&P - NSHPPHYSICALEXAM_GEN_ALL_CORE
T(C): 36.6 (02-29-24 @ 00:16), Max: 36.6 (02-28-24 @ 23:07)  HR: 102 (02-29-24 @ 00:16) (102 - 102)  BP: 107/56 (02-29-24 @ 00:16) (107/56 - 107/56)  RR: 18 (02-29-24 @ 00:16) (18 - 18)  Gen: NAD, well-appearing   Abd: Soft, gravid  Ext: non-tender, non-edematous  SVE: 0/30/-3   Bedside sono: baby A vtx, posterior placenta. baby B vtx, anterior placenta  FHT: baby A baseline 135, moderate variability, + accels, - decels. Baby B baseline 145, moderate variability, + accels, - decels   [place]: actontractile

## 2024-02-29 NOTE — OB NEONATOLOGY/PEDIATRICIAN DELIVERY SUMMARY - NSPEDSNEONOTESB_OBGYN_ALL_OB_FT
Requested by DR Alvarez  to attend a  of a ....y/o G..P.. at  38.1 weeks GA secondary to twin delivery  She had + PNC, is blood type......, HIV neg, HBsAg neg, Treponema Antibody neg, Rubella Imm, GBS neg..., COVID19...  L&D:  ROM .....  Baby born .... with good cry, transferred to warmer, orally suctioned, dried, and examined. Infant showed to father and then transferred to mother for STS.  A/P:  FT Di-Di Twin  Transfer to NICU for further evaluation and management. Requested by DR Alvarez  to attend a  of a 22y/o  at  38.1 weeks GA secondary to twin delivery  She had + PNC, is blood type A pos, HIV neg, HBsAg neg, Treponema Antibody neg, Rubella Imm, GBS neg, hx of fetal echo with.  L&D:  AROM just PTD with clear fluids.  Baby born vertex, DCC x 1min,  with good cry, transferred to warmer, orally suctioned, dried, and examined. Infant showed to father and then transferred to mother for STS.  BW:   A/P:  FT Di-Di Twin B  Transfer to NICU for further evaluation and management as per Peds cardiology Requested by DR Alvarez  to attend a  of a 22y/o  at  38.1 weeks GA secondary to twin delivery  She had + PNC, is blood type A pos, HIV neg, HBsAg neg, Treponema Antibody neg, Rubella Imm, GBS neg, hx of fetal echo with.  L&D:  AROM just PTD with clear fluids.  Baby born vertex with loose nuchal x2,  DCC x 1min,  with good cry, transferred to warmer, orally suctioned, dried, and examined. Infant showed to father and then transferred to mother for STS.  BW: 2990g  A/P:  FT Di-Di Twin BB  Transfer to NICU for further evaluation and management as per Peds cardiology Requested by DR Diana  to attend a  of a 22y/o  at  38.1 weeks GA secondary to twin delivery  She had + PNC, is blood type A pos, HIV neg, HBsAg neg, Treponema Antibody neg, Rubella Imm, GBS neg, hx of fetal echo with limited study secondary to GA, there is an imaging artifact from the spine overlying the transverse aortic arch.  Coarctation can not be ruled out.  L&D:  AROM just PTD with clear fluids.  Baby born vertex with loose nuchal x2,  DCC x 1min,  with good cry, transferred to warmer, orally suctioned, dried, and examined. Infant showed to father and then transferred to mother for STS.  BW: 2990g  A/P:  FT Di-Di Twin BB with above echo findings  Transfer to NICU for further evaluation and management as per Peds cardiology after bonding with parents

## 2024-02-29 NOTE — DISCHARGE NOTE OB - HOSPITAL COURSE
Patient admitted at 38 weeks GA for IOL of di/di-twin gestation. She then underwent a normal spontaneous vaginal delivery of twins, male and female infants, on 2/29, apgars 9/9 for both babies. Post-partum course was uncomplicated. Pain is well controlled with PRN medication. She has no difficulty with ambulation, voiding, or PO intake. Lab values and vital signs are within normal limits prior to discharge.

## 2024-02-29 NOTE — OB RN DELIVERY SUMMARY - NS_SEPSISRSKCALC_OBGYN_ALL_OB_FT
EOS calculated successfully. EOS Risk Factor: 0.5/1000 live births ([facility] national incidence); GA=38w1d; Temp=98.06; ROM=3.733; GBS='Negative'; Antibiotics='No antibiotics or any antibiotics < 2 hrs prior to birth'

## 2024-03-01 LAB
HCT VFR BLD CALC: 29.9 % — LOW (ref 34.5–45)
HGB BLD-MCNC: 9.7 G/DL — LOW (ref 11.5–15.5)
HIV 1+2 AB+HIV1 P24 AG SERPL QL IA: SIGNIFICANT CHANGE UP

## 2024-03-01 RX ORDER — ACETAMINOPHEN 500 MG
3 TABLET ORAL
Qty: 60 | Refills: 0
Start: 2024-03-01 | End: 2024-03-05

## 2024-03-01 RX ORDER — FERROUS SULFATE 325(65) MG
1 TABLET ORAL
Qty: 30 | Refills: 0
Start: 2024-03-01 | End: 2024-03-30

## 2024-03-01 RX ORDER — IBUPROFEN 200 MG
1 TABLET ORAL
Qty: 20 | Refills: 0
Start: 2024-03-01 | End: 2024-03-05

## 2024-03-01 RX ADMIN — Medication 975 MILLIGRAM(S): at 09:30

## 2024-03-01 RX ADMIN — Medication 600 MILLIGRAM(S): at 00:06

## 2024-03-01 RX ADMIN — Medication 600 MILLIGRAM(S): at 17:24

## 2024-03-01 RX ADMIN — Medication 975 MILLIGRAM(S): at 20:52

## 2024-03-01 RX ADMIN — Medication 975 MILLIGRAM(S): at 03:39

## 2024-03-01 NOTE — PROGRESS NOTE ADULT - ASSESSMENT
A/P:  23y  now PPD#1 s/p spontaneous vaginal delivery at 38w2d gestation with di-di twins, receieved IM methergine and [place] cytotec during delivery, otherwise uncomplicated  -Vital signs stable  -Hgb: 10.3 > 9.7, no anemia sx, continue PO iron as prescribed antepartum  -Voiding, tolerating PO, bowel function nml   -Advance care as tolerated   -Continue routine postpartum care and education  -Healthy male and female twin infants in NICU  -Dispo: Continue inpatient management

## 2024-03-02 VITALS
SYSTOLIC BLOOD PRESSURE: 96 MMHG | HEART RATE: 62 BPM | RESPIRATION RATE: 18 BRPM | DIASTOLIC BLOOD PRESSURE: 58 MMHG | TEMPERATURE: 98 F | OXYGEN SATURATION: 99 %

## 2024-03-02 PROCEDURE — 85025 COMPLETE CBC W/AUTO DIFF WBC: CPT

## 2024-03-02 PROCEDURE — 86780 TREPONEMA PALLIDUM: CPT

## 2024-03-02 PROCEDURE — 85014 HEMATOCRIT: CPT

## 2024-03-02 PROCEDURE — 59050 FETAL MONITOR W/REPORT: CPT

## 2024-03-02 PROCEDURE — 86703 HIV-1/HIV-2 1 RESULT ANTBDY: CPT

## 2024-03-02 PROCEDURE — 36415 COLL VENOUS BLD VENIPUNCTURE: CPT

## 2024-03-02 PROCEDURE — 88307 TISSUE EXAM BY PATHOLOGIST: CPT

## 2024-03-02 PROCEDURE — 90707 MMR VACCINE SC: CPT

## 2024-03-02 PROCEDURE — 86900 BLOOD TYPING SEROLOGIC ABO: CPT

## 2024-03-02 PROCEDURE — 86850 RBC ANTIBODY SCREEN: CPT

## 2024-03-02 PROCEDURE — 85018 HEMOGLOBIN: CPT

## 2024-03-02 PROCEDURE — 87389 HIV-1 AG W/HIV-1&-2 AB AG IA: CPT

## 2024-03-02 PROCEDURE — 86901 BLOOD TYPING SEROLOGIC RH(D): CPT

## 2024-03-02 RX ORDER — ETONOGESTREL 68 MG/1
68 IMPLANT SUBCUTANEOUS ONCE
Refills: 0 | Status: DISCONTINUED | OUTPATIENT
Start: 2024-03-02 | End: 2024-03-02

## 2024-03-02 RX ORDER — ETONOGESTREL 68 MG/1
1 IMPLANT SUBCUTANEOUS
Qty: 0 | Refills: 0 | DISCHARGE
Start: 2024-03-02

## 2024-03-02 RX ADMIN — Medication 600 MILLIGRAM(S): at 05:35

## 2024-03-02 RX ADMIN — Medication 975 MILLIGRAM(S): at 09:44

## 2024-03-02 RX ADMIN — Medication 600 MILLIGRAM(S): at 13:19

## 2024-03-02 RX ADMIN — Medication 0.5 MILLILITER(S): at 14:02

## 2024-03-02 NOTE — PROCEDURE NOTE - ADDITIONAL PROCEDURE DETAILS
Area 8cm from medial epicondyle cleaned with alcohol, 8cc of lidocaine were injected subdermal/subcutaenous. Areat then cleaned with iodine x 2 and nexplanon placed without difficulty. Steris and bandage placed. Patient tolerated procedure well.

## 2024-03-02 NOTE — PROGRESS NOTE ADULT - SUBJECTIVE AND OBJECTIVE BOX
ASHISH YANES is a 23y  now PPD#1 s/p spontaneous vaginal delivery at 38w2d gestation with di-di twins, receieved IM methergine and [place] cytotec during delivery, otherwise uncomplicated    S:    The patient has no complaints.  Pain controlled with current treatment regimen.   She is ambulating without difficulty and tolerating PO.   + flatus/-BM/+ voiding   She endorses appropriate lochia, which is decreasing.      O:    T(C): 37.1 (24 @ 03:35), Max: 37.5 (24 @ 21:02)  HR: 64 (24 @ 03:35) (63 - 94)  BP: 105/68 (24 @ 03:35) (89/55 - 138/65)  RR: 18 (24 @ 03:35) (16 - 19)  SpO2: 96% (24 @ 03:35) (88% - 100%)    Gen: NAD, AOx3  Pulm: Breathing comfortably on RA  Abdomen:  Soft, non-tender, non-distended  Uterus:  Fundus firm below umbilicus  VE:  +Lochia  Ext:  Non-tender and non-edematous    LABS                        9.7    x     )-----------( x        ( 01 Mar 2024 04:46 )             29.9           
ASHISH YANES is a 23y  now PPD#2 s/p spontaneous vaginal delivery at 38w2d gestation with di-di twins, receieved IM methergine and [place] cytotec during delivery, otherwise uncomplicated    S:    The patient has no complaints.  Pain controlled with current treatment regimen.   She is ambulating without difficulty and tolerating PO.   + flatus/-BM/+ voiding   She endorses appropriate lochia, which is decreasing.    Patient denies lightheadedness, dizziness, palpitations, shortness of breath and chest pain.     O:    Vital Signs Last 24 Hrs  T(C): 36.6 (02 Mar 2024 04:09), Max: 36.6 (02 Mar 2024 04:09)  T(F): 97.8 (02 Mar 2024 04:09), Max: 97.8 (02 Mar 2024 04:09)  HR: 62 (02 Mar 2024 04:09) (61 - 77)  BP: 96/58 (02 Mar 2024 04:09) (96/58 - 105/69)  RR: 18 (02 Mar 2024 04:09) (17 - 18)  SpO2: 99% (02 Mar 2024 04:09) (99% - 100%)    Parameters below as of 02 Mar 2024 04:09  Patient On (Oxygen Delivery Method): room air        Gen: NAD, AOx3  Pulm: Breathing comfortably on RA  Abdomen:  Soft, non-tender, non-distended  Uterus:  Fundus firm below umbilicus  VE:  +Lochia  Ext:  Non-tender and non-edematous    LABS                        9.7    x     )-----------( x        ( 01 Mar 2024 04:46 )             29.9

## 2024-03-02 NOTE — PROGRESS NOTE ADULT - ASSESSMENT
A/P:  23y  now PPD#2 s/p spontaneous vaginal delivery at 38w2d gestation with di-di twins, receieved IM methergine and [place] cytotec during delivery, otherwise uncomplicated  -Vital signs stable  -Hgb: 10.3 > 9.7, no anemia sx, continue PO iron as prescribed antepartum  -Voiding, tolerating PO, bowel function nml   -Advance care as tolerated   -Continue routine postpartum care and education  -Healthy male and female twin infants in NICU  -Dispo: Pt is stable for discharge home pending attending approval

## 2024-03-02 NOTE — PROGRESS NOTE ADULT - ATTENDING COMMENTS
PPD#2  s/p  of twins  Doing well  meeting milestones  Nexplanon placed today  Hg 10.3 --> 9.7- no signs or sx's of anemia-will send iron to pharmacy  f/u in the office in 2 weeks  precautions provided  discharge home

## 2024-03-05 ENCOUNTER — APPOINTMENT (OUTPATIENT)
Dept: ANTEPARTUM | Facility: CLINIC | Age: 24
End: 2024-03-05

## 2024-03-12 ENCOUNTER — APPOINTMENT (OUTPATIENT)
Dept: ANTEPARTUM | Facility: CLINIC | Age: 24
End: 2024-03-12

## 2024-03-15 LAB — SURGICAL PATHOLOGY STUDY: SIGNIFICANT CHANGE UP

## 2024-10-24 NOTE — CONSULT NOTE ADULT - NS_MD_PANP_GEN_ALL_CORE
ambulatory Duration Of Freeze Thaw-Cycle (Seconds): 0 Render Note In Bullet Format When Appropriate: No Show Aperture Variable?: Yes Consent: The patient's consent was obtained including but not limited to risks of crusting, scabbing, blistering, scarring, darker or lighter pigmentary change, recurrence, incomplete removal and infection. Detail Level: Detailed Post-Care Instructions: I reviewed with the patient in detail post-care instructions. Patient is to wear sunprotection, and avoid picking at any of the treated lesions. Pt may apply Vaseline to crusted or scabbing areas. Attending and PA/NP shared services statement (NON-critical care): Detail Level: Zone Medical Necessity Information: It is in your best interest to select a reason for this procedure from the list below. All of these items fulfill various CMS LCD requirements except the new and changing color options. Medical Necessity Clause: This procedure was medically necessary because the lesions that were treated were: Spray Paint Text: The liquid nitrogen was applied to the skin utilizing a spray paint frosting technique.

## 2025-01-28 NOTE — OB RN TRIAGE NOTE - NS_MEANSOFARRIVAL_OBGYN_ALL_OB
Pedro Qureshi  Surgical Oncology  [address] [address]  Phone: [phone]  Fax: [phone]  Follow Up Time:    Ambulatory